# Patient Record
Sex: FEMALE | Race: ASIAN | Employment: FULL TIME | URBAN - METROPOLITAN AREA
[De-identification: names, ages, dates, MRNs, and addresses within clinical notes are randomized per-mention and may not be internally consistent; named-entity substitution may affect disease eponyms.]

---

## 2017-01-17 ENCOUNTER — OFFICE VISIT (OUTPATIENT)
Dept: FAMILY MEDICINE | Facility: CLINIC | Age: 37
End: 2017-01-17
Payer: COMMERCIAL

## 2017-01-17 VITALS
RESPIRATION RATE: 12 BRPM | HEART RATE: 86 BPM | BODY MASS INDEX: 28.14 KG/M2 | SYSTOLIC BLOOD PRESSURE: 140 MMHG | WEIGHT: 164 LBS | TEMPERATURE: 98.2 F | DIASTOLIC BLOOD PRESSURE: 100 MMHG

## 2017-01-17 DIAGNOSIS — J40 BRONCHITIS: Primary | ICD-10-CM

## 2017-01-17 DIAGNOSIS — J45.20 INTERMITTENT ASTHMA, UNCOMPLICATED: ICD-10-CM

## 2017-01-17 PROCEDURE — 99213 OFFICE O/P EST LOW 20 MIN: CPT | Performed by: FAMILY MEDICINE

## 2017-01-17 RX ORDER — AZITHROMYCIN 250 MG/1
TABLET, FILM COATED ORAL
Qty: 6 TABLET | Refills: 0 | Status: SHIPPED | OUTPATIENT
Start: 2017-01-17 | End: 2017-04-25

## 2017-01-17 RX ORDER — ALFUZOSIN HYDROCHLORIDE 10 MG/1
TABLET, EXTENDED RELEASE ORAL
Refills: 11 | COMMUNITY
Start: 2017-01-04 | End: 2017-04-25

## 2017-01-17 NOTE — MR AVS SNAPSHOT
After Visit Summary   1/17/2017    Kelly Wright    MRN: [id number]           Patient Information     Date Of Birth          1980        Visit Information        Provider Department      1/17/2017 3:30 PM [facility]ule[facility] Allred [facility]        Today's Diagnoses     Bronchitis    -  1     Intermittent asthma, uncomplicated           Care Instructions    Take medications as directed.  Treatment  and symptomatic cares discussed   Follow up if problem or concern           Follow-ups after your visit        Who to contact     If you have questions or need follow up information about today's clinic visit or your schedule please contact [facility] [place] directly at [phone].  Normal or non-critical lab and imaging results will be communicated to you by MyChart, letter or phone within 4 business days after the clinic has received the results. If you do not hear from us within 7 days, please contact the clinic through HeyWire Businesshart or phone. If you have a critical or abnormal lab result, we will notify you by phone as soon as possible.  Submit refill requests through Virool or call your pharmacy and they will forward the refill request to us. Please allow 3 business days for your refill to be completed.          Additional Information About Your Visit        MyChart Information     Virool gives you secure access to your electronic health record. If you see a primary care provider, you can also send messages to your care team and make appointments. If you have questions, please call your primary care clinic.  If you do not have a primary care provider, please call [phone] and they will assist you.        Care EveryWhere ID     This is your Care EveryWhere ID. This could be used by other organizations to access your [place] medical records  [id number]        Your Vitals Were     Pulse Temperature Respirations Last Period          86 98.2  F (36.8  C) (Tympanic)  12 01/05/2017         Blood Pressure from Last 3 Encounters:   01/17/17 140/100   02/15/16 126/40   01/22/16 130/80    Weight from Last 3 Encounters:   01/17/17 164 lb (74.39 kg)   02/15/16 160 lb (72.576 kg)   01/22/16 156 lb (70.761 kg)              Today, you had the following     No orders found for display         Today's Medication Changes          These changes are accurate as of: 1/17/17  4:02 PM.  If you have any questions, ask your nurse or doctor.               Start taking these medicines.        Dose/Directions    azithromycin 250 MG tablet   Commonly known as:  ZITHROMAX   Used for:  Bronchitis   Started by:  Zulema Allred MD        Two tablets first day, then one tablet daily for four days.   Quantity:  6 tablet   Refills:  0            Where to get your medicines      These medications were sent to [facility] - [place], [place] - [address] AT [address] [place] [address] & [facility]  [address] [place] [address]    Hours:  24-hours Phone:  [phone]    - azithromycin 250 MG tablet             Primary Care Provider Office Phone # Fax #    Errol Avtar Delcid [phone]       [facility] [address]  [facility] [address]        Thank you!     Thank you for choosing [facility]  for your care. Our goal is always to provide you with excellent care. Hearing back from our patients is one way we can continue to improve our services. Please take a few minutes to complete the written survey that you may receive in the mail after your visit with us. Thank you!             Your Updated Medication List - Protect others around you: Learn how to safely use, store and throw away your medicines at www.disposemymeds.org.          This list is accurate as of: 1/17/17  4:02 PM.  Always use your most recent med list.                   Brand Name Dispense Instructions for use    albuterol 108 (90 BASE) MCG/ACT Inhaler    albuterol    2 Inhaler     Inhale 2 puffs into the lungs every 6 hours as needed for shortness of breath / dyspnea       alfuzosin 10 MG 24 hr tablet    UROXATRAL     TK 1 T PO DAILY       ASTHMA CONTROLLER MEDICATION NOT PRESCRIBED (INTENTIONAL)     0 each    Asthma controller medication not prescribed intentionally due to Other:not needed       azithromycin 250 MG tablet    ZITHROMAX    6 tablet    Two tablets first day, then one tablet daily for four days.       fluticasone 50 MCG/ACT spray    FLONASE    3 Package    Spray 1-2 sprays into both nostrils daily       Multi-vitamin Tabs tablet      Take 1 tablet by mouth daily       PROBIOTIC PO          SULINDAC PO      Take by mouth 2 times daily       SUMAtriptan 100 MG tablet    IMITREX    6 tablet    Take 1 tablet (100 mg) by mouth at onset of headache for migraine May repeat in 2 hours if needed: max 2/day; average number of headaches monthly       VALTREX PO      Take 1,000 mg by mouth       ZYRTEC-D PO      Take by mouth daily

## 2017-01-17 NOTE — PATIENT INSTRUCTIONS
Take medications as directed.  Treatment  and symptomatic cares discussed   Follow up if problem or concern

## 2017-01-17 NOTE — NURSING NOTE
"Chief Complaint   Patient presents with     Cough       Initial /100 mmHg  Pulse 86  Temp(Src) 98.2  F (36.8  C) (Tympanic)  Resp 12  Wt 164 lb (74.39 kg)  LMP 01/05/2017 Estimated body mass index is 28.14 kg/(m^2) as calculated from the following:    Height as of 2/15/16: 5' 4\" (1.626 m).    Weight as of this encounter: 164 lb (74.39 kg).  BP completed using cuff size: regular  "

## 2017-01-17 NOTE — PROGRESS NOTES
SUBJECTIVE:                                                    Kelly Wright is a 36 year old female who presents to clinic today for the following health issues:      Acute Illness   Acute illness concerns: cough  Onset: 2 weeks ago  , started as cold although feeling like sx are worse past few days and more in to chest     Fever: no    Chills/Sweats: no    Headache (location?): YES- slight     Sinus Pressure:no    Conjunctivitis:  no    Ear Pain: no    Rhinorrhea: no    Congestion: YES    Sore Throat: slight irrtation, dry      Cough: YES-productive of yellow thick sputum, productive of green sputum    Wheeze: no occasional tightness so juts used inhaler although did not help much . Has know hx of asthma and  allergies although she thinks her asthma is well controlled and she rarely needs inhaler. Allergies are usually worse in fall but has other indoor  allergies toto and Flonase helps she uses it all year     Decreased Appetite: no    Nausea: no    Vomiting: no    Diarrhea:  no    Dysuria/Freq.: no    Fatigue/Achiness: no    Sick/Strep Exposure: YES     Therapies Tried and outcome: alkaseltzer cold and cough, dayquil, sudafed , zyrtec, inhaler       Asthma Follow-Up    Was ACT completed today?    Yes    ACT Total Scores 11/19/2015   ACT TOTAL SCORE -   ASTHMA ER VISITS -   ASTHMA HOSPITALIZATIONS -   ACT TOTAL SCORE (Goal Greater than or Equal to 20) 25   In the past 12 months, how many times did you visit the emergency room for your asthma without being admitted to the hospital? 0   In the past 12 months, how many times were you hospitalized overnight because of your asthma? 0         Problem list and histories reviewed & adjusted, as indicated.  Additional history: as documented    Problem list, Medication list, Allergies, and Medical/Social/Surgical histories reviewed in EPIC and updated as appropriate.    ROS:  C: NEGATIVE for fever, chills, change in weight  E: NEGATIVE for vision changes or  irritation  ENT/MOUTH: as per HPI   RESP:as above  CV: NEGATIVE for chest pain, palpitations or peripheral edema  GI: NEGATIVE for nausea, abdominal pain, heartburn, or change in bowel habits  M: NEGATIVE for significant arthralgias or myalgia  P: NEGATIVE for changes in mood or affect    OBJECTIVE:                                                    /100 mmHg  Pulse 86  Temp(Src) 98.2  F (36.8  C) (Tympanic)  Resp 12  Wt 164 lb (74.39 kg)  LMP 01/05/2017  Body mass index is 28.14 kg/(m^2).  GENERAL: healthy, alert and no distress  EYES: Eyes grossly normal to inspection  HENT: ear canals and TM's normal and oral mucous membranes moist, Throat with mild pharyngeal erythema, no sinus  tenderness  NECK: no adenopathy,   RESP: lungs clear to auscultation - no rales, rhonchi or wheezes  CV: regular rate and rhythm, normal S1 S2, no S3 or S4, no murmur, click or rub, no peripheral edema and peripheral pulses strong         ASSESSMENT/PLAN:                                                        (J40) Bronchitis  (primary encounter diagnosis)  Comment:   Plan: azithromycin (ZITHROMAX) 250 MG tablet              URI lingering onto bronchitis. Treat with z pack. Cares and symptomatic treatment discussed follow up if problem     (J45.20) Intermittent asthma, uncomplicated  Comment:   Plan: stable       Patient expressed understanding and agreement with treatment plan. All patient's questions were answered, will let me know if has more later.  Medications: Rx's: Reviewed the potential side effects/complications of medications prescribed.       Zulema Allred MD  [facility]

## 2017-01-18 ASSESSMENT — ASTHMA QUESTIONNAIRES: ACT_TOTALSCORE: 25

## 2017-04-25 ENCOUNTER — OFFICE VISIT (OUTPATIENT)
Dept: FAMILY MEDICINE | Facility: CLINIC | Age: 37
End: 2017-04-25
Payer: COMMERCIAL

## 2017-04-25 VITALS
HEIGHT: 64 IN | HEART RATE: 81 BPM | BODY MASS INDEX: 27.66 KG/M2 | DIASTOLIC BLOOD PRESSURE: 92 MMHG | OXYGEN SATURATION: 97 % | TEMPERATURE: 98 F | WEIGHT: 162 LBS | SYSTOLIC BLOOD PRESSURE: 133 MMHG

## 2017-04-25 DIAGNOSIS — R35.0 URINARY FREQUENCY: Primary | ICD-10-CM

## 2017-04-25 LAB
ALBUMIN UR-MCNC: NEGATIVE MG/DL
APPEARANCE UR: CLEAR
BACTERIA #/AREA URNS HPF: ABNORMAL /HPF
BILIRUB UR QL STRIP: NEGATIVE
COLOR UR AUTO: YELLOW
GLUCOSE UR STRIP-MCNC: NEGATIVE MG/DL
HGB UR QL STRIP: ABNORMAL
KETONES UR STRIP-MCNC: NEGATIVE MG/DL
LEUKOCYTE ESTERASE UR QL STRIP: ABNORMAL
NITRATE UR QL: NEGATIVE
NON-SQ EPI CELLS #/AREA URNS LPF: ABNORMAL /LPF
PH UR STRIP: 7 PH (ref 5–7)
RBC #/AREA URNS AUTO: ABNORMAL /HPF (ref 0–2)
SP GR UR STRIP: 1.01 (ref 1–1.03)
URN SPEC COLLECT METH UR: ABNORMAL
UROBILINOGEN UR STRIP-ACNC: 0.2 EU/DL (ref 0.2–1)
WBC #/AREA URNS AUTO: ABNORMAL /HPF (ref 0–2)

## 2017-04-25 PROCEDURE — 87086 URINE CULTURE/COLONY COUNT: CPT | Performed by: NURSE PRACTITIONER

## 2017-04-25 PROCEDURE — 99213 OFFICE O/P EST LOW 20 MIN: CPT | Performed by: NURSE PRACTITIONER

## 2017-04-25 PROCEDURE — 81001 URINALYSIS AUTO W/SCOPE: CPT | Performed by: NURSE PRACTITIONER

## 2017-04-25 RX ORDER — ALFUZOSIN HYDROCHLORIDE 10 MG/1
10 TABLET, EXTENDED RELEASE ORAL DAILY
COMMUNITY
Start: 2017-04-25 | End: 2018-06-28

## 2017-04-25 NOTE — NURSING NOTE
"Chief Complaint   Patient presents with     Urinary Problem       Initial BP (!) 133/92 (BP Location: Left arm, Patient Position: Chair, Cuff Size: Adult Regular)  Pulse 81  Temp 98  F (36.7  C) (Oral)  Ht 5' 4\" (1.626 m)  Wt 162 lb (73.5 kg)  LMP 04/01/2017  SpO2 97%  Breastfeeding? No  BMI 27.81 kg/m2 Estimated body mass index is 27.81 kg/(m^2) as calculated from the following:    Height as of this encounter: 5' 4\" (1.626 m).    Weight as of this encounter: 162 lb (73.5 kg).  Medication Reconciliation: complete  "

## 2017-04-25 NOTE — PROGRESS NOTES
HPI      SUBJECTIVE:                                                    Kelly Wright is a 36 year old female who presents to clinic today for the following health issues:      Has urethral polyps that flare up and mimic UTIs  Symptoms today are for a few days, and slightly longer than her normal flare.   Just is feeling uncomfortable-pelvic   Slight urinary frequency  No dysuria   All of her UTIs have been neg UAs and positive cultures    Is doing IUI next month        Past Medical History:   Diagnosis Date     Abdominal pain 9/12    elev lft's, then ruq us fatty liver, endoscopic us done 10/12 and dilated ducts.     Arthritis      Cervical dysplasia      Condyloma acuminatum      Elevated liver enzymes 9/12    felt due to fatty liver     Fatty liver 9/12     Fibroids      Gastro-oesophageal reflux disease      H/O cold sores      HPV in female      Intermittent asthma     mostly with allergies     Joint pain 2011    Dr. Dickson     Migraine      Migraines 2007     Pancreatic disease      Pelvic pain in female      PONV (postoperative nausea and vomiting)      Seasonal allergies      Sphincter of Oddi dysfunction 2014    Dr. Ras Juárez, had ercp, eus, panc stents placed     Spondyloarthropathy (H)      Urinary frequency      Past Surgical History:   Procedure Laterality Date     ENDOSCOPIC RETROGRADE CHOLANGIOPANCREATOGRAM  6/10/2014    Procedure: ENDOSCOPIC RETROGRADE CHOLANGIOPANCREATOGRAM;  Surgeon: Ras Juárez MD;  Location: [facility] OR     ENDOSCOPIC RETROGRADE CHOLANGIOPANCREATOGRAPHY  11/12     ESOPHAGOSCOPY, GASTROSCOPY, DUODENOSCOPY (EGD), COMBINED  7/8/2014    Procedure: COMBINED ESOPHAGOSCOPY, GASTROSCOPY, DUODENOSCOPY (EGD), REMOVE FOREIGN BODY;  Surgeon: Gabbie Menjivar MD;  Location: [facility]     GYN SURGERY      LEEP      [facility] ENDOSCOPY W EUS N/A 5/20/2014    Procedure: COMBINED ENDOSCOPIC ULTRASOUND, ESOPHAGOSCOPY, GASTROSCOPY, DUODENOSCOPY (EGD);  Surgeon: Gabbie Menjivar MD;   "Location: [facility] GI     LAPAROSCOPIC CHOLECYSTECTOMY  11/12     LAPAROSCOPIC MYOMECTOMY UTERUS N/A 10/30/2015    Procedure: LAPAROSCOPIC MYOMECTOMY UTERUS;  Surgeon: Mayda Dior MD;  Location:  OR     Social History   Substance Use Topics     Smoking status: Never Smoker     Smokeless tobacco: Never Used     Alcohol use 1.8 oz/week     3 Standard drinks or equivalent per week      Comment: 3-5 a week     Current Outpatient Prescriptions   Medication Sig Dispense Refill     alfuzosin (UROXATRAL) 10 MG 24 hr tablet Take 1 tablet (10 mg) by mouth daily       albuterol (ALBUTEROL) 108 (90 BASE) MCG/ACT inhaler Inhale 2 puffs into the lungs every 6 hours as needed for shortness of breath / dyspnea 2 Inhaler 2     multivitamin, therapeutic with minerals (MULTI-VITAMIN) TABS Take 1 tablet by mouth daily       Probiotic Product (PROBIOTIC PO)        ValACYclovir HCl (VALTREX PO) Take 1,000 mg by mouth       SUMAtriptan (IMITREX) 100 MG tablet Take 1 tablet (100 mg) by mouth at onset of headache for migraine May repeat in 2 hours if needed: max 2/day; average number of headaches monthly 6 tablet 1     fluticasone (FLONASE) 50 MCG/ACT nasal spray Spray 1-2 sprays into both nostrils daily 3 Package 3     Cetirizine-Pseudoephedrine (ZYRTEC-D PO) Take by mouth daily        SULINDAC PO Take by mouth 2 times daily        ASTHMA CONTROLLER MEDICATION NOT PRESCRIBED, INTENTIONAL, Asthma controller medication not prescribed intentionally due to Other:not needed 0 each 0     Allergies   Allergen Reactions     Amoxicillin Rash     Animal Dander      Diclofenac Nausea and Vomiting     Keflex [Cephalexin Monohydrate] Rash     Seasonal Allergies        Reviewed PMH, med list and allergies.      ROS  Detailed as above       BP (!) 133/92 (BP Location: Left arm, Patient Position: Chair, Cuff Size: Adult Regular)  Pulse 81  Temp 98  F (36.7  C) (Oral)  Ht 5' 4\" (1.626 m)  Wt 162 lb (73.5 kg)  LMP 04/01/2017  SpO2 97%  Breastfeeding? " No  BMI 27.81 kg/m2      Physical Exam   Constitutional: She is well-developed, well-nourished, and in no distress.   Pulmonary/Chest: Effort normal.   Neurological: She is alert.   Psychiatric: Mood and affect normal.   Vitals reviewed.      Assessment and Plan:       ICD-10-CM    1. Urinary frequency R35.0 UA reflex to Microscopic and Culture     alfuzosin (UROXATRAL) 10 MG 24 hr tablet     Urine Microscopic     Urine Culture Aerobic Bacterial       Neg UA  Ordered culture based on her history   If symptoms persist, she should see her urologist       KAREN Conklin, CNP  [facility]

## 2017-04-26 LAB
BACTERIA SPEC CULT: NORMAL
MICRO REPORT STATUS: NORMAL
SPECIMEN SOURCE: NORMAL

## 2017-07-21 DIAGNOSIS — J45.20 MILD INTERMITTENT ASTHMA WITHOUT COMPLICATION: Primary | ICD-10-CM

## 2017-07-21 DIAGNOSIS — J40 BRONCHITIS: ICD-10-CM

## 2017-07-21 NOTE — TELEPHONE ENCOUNTER
Reason for Call:  Medication or medication refill:    Do you use a [facility]?  Name of the pharmacy and phone number for the current request:    [facility] [place], [place] - [address]      Name of the medication requested: albuterol (ALBUTEROL) 108 (90 BASE) MCG/ACT inhaler    Other request: pt received it for a [address].    Can we leave a detailed message on this number? YES    Phone number patient can be reached at: Home number on file [phone] (home)    Best Time: any    Call taken on 7/21/2017 at 10:34 AM by Diamond Hodge

## 2017-07-21 NOTE — TELEPHONE ENCOUNTER
albuterol (ALBUTEROL) 108 (90 BASE) MCG/ACT inhaler 2 Inhaler 2 1/22/2016  No   Sig: Inhale 2 puffs into the lungs every 6 hours as needed for shortness of breath / dyspnea     January 2016 Associated Diagnoses   (? CHANGE?  --asthma is on on problem list)   Bronchitis [J40]  - Primary                Last Written Prescription Date: 01/22/2016  Last Fill Quantity: 2, # refills: 2    Last Office Visit with G, [facility] or [facility] prescribing provider:  Dr. Delcid 5/2015  Others since, Irish 4/2017    Future Office Visit:   no    Date of Last Asthma Action Plan Letter:   Asthma Action Plan Q1 Year    Topic Date Due     Asthma Action Plan - yearly  01/05/2017      Asthma Control Test:   ACT Total Scores 1/17/2017   ACT TOTAL SCORE -   ASTHMA ER VISITS -   ASTHMA HOSPITALIZATIONS -   ACT TOTAL SCORE (Goal Greater than or Equal to 20) 25   In the past 12 months, how many times did you visit the emergency room for your asthma without being admitted to the hospital? 0   In the past 12 months, how many times were you hospitalized overnight because of your asthma? 0       Date of Last Spirometry Test:   No results found for this or any previous visit.

## 2017-07-24 DIAGNOSIS — J45.20 MILD INTERMITTENT ASTHMA WITHOUT COMPLICATION: ICD-10-CM

## 2017-07-24 RX ORDER — ALBUTEROL SULFATE 90 UG/1
2 AEROSOL, METERED RESPIRATORY (INHALATION) EVERY 6 HOURS PRN
Qty: 1 INHALER | Refills: 0 | Status: SHIPPED | OUTPATIENT
Start: 2017-07-24 | End: 2017-12-21

## 2017-07-24 RX ORDER — ALBUTEROL SULFATE 90 UG/1
2 AEROSOL, METERED RESPIRATORY (INHALATION) EVERY 6 HOURS PRN
Qty: 1 INHALER | Refills: 0 | Status: SHIPPED | OUTPATIENT
Start: 2017-07-24 | End: 2017-07-24

## 2017-07-24 NOTE — TELEPHONE ENCOUNTER
Medication is being filled for 1 time refill only due to:  Patient needs to be seen because it has been more than one year since last visit.     Monique Rios RN, BSN

## 2017-10-26 ENCOUNTER — TELEPHONE (OUTPATIENT)
Dept: FAMILY MEDICINE | Facility: CLINIC | Age: 37
End: 2017-10-26

## 2017-12-05 ENCOUNTER — OFFICE VISIT (OUTPATIENT)
Dept: FAMILY MEDICINE | Facility: CLINIC | Age: 37
End: 2017-12-05
Payer: COMMERCIAL

## 2017-12-05 VITALS
HEIGHT: 64 IN | SYSTOLIC BLOOD PRESSURE: 124 MMHG | DIASTOLIC BLOOD PRESSURE: 86 MMHG | WEIGHT: 162 LBS | OXYGEN SATURATION: 98 % | BODY MASS INDEX: 27.66 KG/M2 | HEART RATE: 82 BPM | TEMPERATURE: 98.3 F

## 2017-12-05 DIAGNOSIS — J06.9 VIRAL UPPER RESPIRATORY TRACT INFECTION: ICD-10-CM

## 2017-12-05 DIAGNOSIS — J30.1 SEASONAL ALLERGIC RHINITIS DUE TO POLLEN, UNSPECIFIED CHRONICITY: ICD-10-CM

## 2017-12-05 DIAGNOSIS — J45.20 MILD INTERMITTENT ASTHMA WITHOUT COMPLICATION: ICD-10-CM

## 2017-12-05 DIAGNOSIS — R05.9 COUGH: Primary | ICD-10-CM

## 2017-12-05 PROCEDURE — 99213 OFFICE O/P EST LOW 20 MIN: CPT | Performed by: FAMILY MEDICINE

## 2017-12-05 RX ORDER — BENZONATATE 100 MG/1
100 CAPSULE ORAL 3 TIMES DAILY PRN
Qty: 42 CAPSULE | Refills: 0 | Status: SHIPPED | OUTPATIENT
Start: 2017-12-05 | End: 2018-06-28

## 2017-12-05 NOTE — NURSING NOTE
"Chief Complaint   Patient presents with     URI       Initial BP (!) 133/94  Pulse 82  Temp 98.3  F (36.8  C) (Tympanic)  Ht 5' 4\" (1.626 m)  Wt 162 lb (73.5 kg)  LMP 11/19/2017  SpO2 98%  BMI 27.81 kg/m2 Estimated body mass index is 27.81 kg/(m^2) as calculated from the following:    Height as of this encounter: 5' 4\" (1.626 m).    Weight as of this encounter: 162 lb (73.5 kg).  Medication Reconciliation: complete  "

## 2017-12-05 NOTE — MR AVS SNAPSHOT
After Visit Summary   12/5/2017    Kelly Wright    MRN: [id number]           Patient Information     Date Of Birth          1980        Visit Information        Provider Department      12/5/2017 3:40 PM Zulema Allred MD [facility]        Today's Diagnoses     Cough    -  1    Seasonal allergic rhinitis due to pollen, unspecified chronicity        Mild intermittent asthma without complication        Viral upper respiratory tract infection          Care Instructions    Take medications as directed.  Cares and symptomatic cares discussed   Follow up if problem or concern             Follow-ups after your visit        Who to contact     If you have questions or need follow up information about today's clinic visit or your schedule please contact [facility] directly at [phone].  Normal or non-critical lab and imaging results will be communicated to you by Poppinhart, letter or phone within 4 business days after the clinic has received the results. If you do not hear from us within 7 days, please contact the clinic through Poppinhart or phone. If you have a critical or abnormal lab result, we will notify you by phone as soon as possible.  Submit refill requests through Multic[employer] Media or call your pharmacy and they will forward the refill request to us. Please allow 3 business days for your refill to be completed.          Additional Information About Your Visit        MyChart Information     Mult[employer] Media gives you secure access to your electronic health record. If you see a primary care provider, you can also send messages to your care team and make appointments. If you have questions, please call your primary care clinic.  If you do not have a primary care provider, please call [phone] and they will assist you.        Care EveryWhere ID     This is your Care EveryWhere ID. This could be used by other organizations to access your [facility]  "records  [id number]        Your Vitals Were     Pulse Temperature Height Last Period Pulse Oximetry BMI (Body Mass Index)    82 98.3  F (36.8  C) (Tympanic) 5' 4\" (1.626 m) 11/19/2017 98% 27.81 kg/m2       Blood Pressure from Last 3 Encounters:   12/05/17 124/86   04/25/17 (!) 133/92   01/17/17 (!) 140/100    Weight from Last 3 Encounters:   12/05/17 162 lb (73.5 kg)   04/25/17 162 lb (73.5 kg)   01/17/17 164 lb (74.4 kg)              Today, you had the following     No orders found for display         Today's Medication Changes          These changes are accurate as of: 12/5/17  4:10 PM.  If you have any questions, ask your nurse or doctor.               Start taking these medicines.        Dose/Directions    benzonatate 100 MG capsule   Commonly known as:  TESSALON   Used for:  Cough   Started by:  Zulema Allred MD        Dose:  100 mg   Take 1 capsule (100 mg) by mouth 3 times daily as needed for cough   Quantity:  42 capsule   Refills:  0            Where to get your medicines      These medications were sent to [facility] - [place], [place] - [address] AT [address]  [address] [place] [address] [address]     Phone:  [phone]     benzonatate 100 MG capsule                Primary Care Provider Office Phone # Fax #    Errol Delcid [phone] [address] [address]        Equal Access to Services     [facility] AH: Hadii aad ku hadashedis Sotete, waaxda luqadaha, qaybta kaalmada hallie, rex keller. So [facility] [phone].    ATENCIÓN: Si habla español, tiene a orozco disposición servicios gratuitos de asistencia lingüística. Mandeep young [phone].    We comply with applicable federal civil rights laws and [place] laws. We do not discriminate on the basis of race, color, national origin, age, disability, sex, sexual orientation, or gender identity.            Thank " you!     Thank you for choosing [facility] [place]  for your care. Our goal is always to provide you with excellent care. Hearing back from our patients is one way we can continue to improve our services. Please take a few minutes to complete the written survey that you may receive in the mail after your visit with us. Thank you!             Your Updated Medication List - Protect others around you: Learn how to safely use, store and throw away your medicines at www.disposemymeds.org.          This list is accurate as of: 12/5/17  4:10 PM.  Always use your most recent med list.                   Brand Name Dispense Instructions for use Diagnosis    albuterol 108 (90 BASE) MCG/ACT Inhaler    PROAIR HFA    1 Inhaler    Inhale 2 puffs into the lungs every 6 hours as needed for shortness of breath / dyspnea Due for office visit: [phone]    Mild intermittent asthma without complication       alfuzosin 10 MG 24 hr tablet    UROXATRAL     Take 1 tablet (10 mg) by mouth daily    Urinary frequency       ASTHMA CONTROLLER MEDICATION NOT PRESCRIBED (INTENTIONAL)     0 each    Asthma controller medication not prescribed intentionally due to Other:not needed    Intermittent asthma       benzonatate 100 MG capsule    TESSALON    42 capsule    Take 1 capsule (100 mg) by mouth 3 times daily as needed for cough    Cough       fluticasone 50 MCG/ACT spray    FLONASE    3 Package    Spray 1-2 sprays into both nostrils daily    Seasonal allergies       Multi-vitamin Tabs tablet      Take 1 tablet by mouth daily        PROBIOTIC PO           VALTREX PO      Take 1,000 mg by mouth        ZYRTEC-D PO      Take by mouth daily

## 2017-12-05 NOTE — PROGRESS NOTES
SUBJECTIVE:   Kelly Wright is a 37 year old female who presents to clinic today for the following health issues:      RESPIRATORY SYMPTOMS      Duration: x 2 weeks     Description  Was feeling quiet bad earlier, although she was on z-pack for something else so she thinsk it may have helped. Finished last dose yesterday  So feels slight better but has ongoing cough nasal congestion, rhinorrhea mostly , cough dry  and sometimes chest tightness  lately has tried her inhaler not helping. much . She just wish to try cough drops taht has worked well for her in the past     Severity: moderate    Accompanying signs and symptoms: shortness of breath sometimes and has hx of asthma although usually it is well controled and she rarely needs inhaler     History (predisposing factors):  asthma    Precipitating or alleviating factors: None    Therapies tried and outcome:  OTC cough syrup and inhalers             Problem list and histories reviewed & adjusted, as indicated.  Additional history: as documented    Patient Active Problem List   Diagnosis     Seasonal allergies     Headache     Joint pain     Intermittent asthma     Fatty liver     Elevated liver enzymes     Sphincter of Oddi dysfunction     Hypercholesterolemia     Mild intermittent asthma without complication     Overweight (BMI 25.0-29.9)     Migraine without aura and without status migrainosus, not intractable     Intermittent asthma, uncomplicated     Past Surgical History:   Procedure Laterality Date     ENDOSCOPIC RETROGRADE CHOLANGIOPANCREATOGRAM  6/10/2014    Procedure: ENDOSCOPIC RETROGRADE CHOLANGIOPANCREATOGRAM;  Surgeon: Ras Juárez MD;  Location:  OR     ENDOSCOPIC RETROGRADE CHOLANGIOPANCREATOGRAPHY  11/12     ESOPHAGOSCOPY, GASTROSCOPY, DUODENOSCOPY (EGD), COMBINED  7/8/2014    Procedure: COMBINED ESOPHAGOSCOPY, GASTROSCOPY, DUODENOSCOPY (EGD), REMOVE FOREIGN BODY;  Surgeon: Gabbie Menjivar MD;  Location:  GI     [facility] SURG[facility]    [facility]    Procedure: COMBINED ENDOSCOPIC ULTRASOUND, ESOPHAGOSCOPY, GASTROSCOPY, DUODENOSCOPY (EGD);  Surgeon: Gabbie Menjivar MD;  Location: [facility]     LAPAROSCOPIC CHOLECYSTECTOMY  11/12     LAPAROSCOPIC MYOMECTOMY UTERUS N/A 10/30/2015    Procedure: LAPAROSCOPIC MYOMECTOMY UTERUS;  Surgeon: Mayda Dior MD;  Location:  OR       Social History   Substance Use Topics     Smoking status: Never Smoker     Smokeless tobacco: Never Used     Alcohol use 1.8 oz/week     3 Standard drinks or equivalent per week      Comment: 3-5 a week     Family History   Problem Relation Age of Onset     Unknown/Adopted Father              Reviewed and updated as needed this visit by clinical staff     Reviewed and updated as needed this visit by Provider         ROS:  Constitutional, HEENT, cardiovascular, pulmonary, GI, , musculoskeletal, neuro, skin, endocrine and psych systems are negative, except as otherwise noted.      OBJECTIVE:   BP (!) 133/94  Pulse 82  Temp 98.3  F (36.8  C) (Tympanic)  Ht 5' 4\" (1.626 m)  Wt 162 lb (73.5 kg)  LMP 11/19/2017  SpO2 98%  BMI 27.81 kg/m2  Body mass index is 27.81 kg/(m^2).  GENERAL: healthy, alert and no distress  EYES: Eyes grossly normal to inspection  HENT: ear canals and TM's normal, nasal mucosa edematous , oropharynx clear and oral mucous membranes moist  NECK: no adenopathy  RESP: lungs clear to auscultation - no rales, rhonchi or wheezes  CV: regular rate and rhythm, normal S1 S2, no S3 or S4, no murmur,  ABDOMEN: soft, nontender, no hepatosplenomegaly, no masses and bowel sounds normal          ASSESSMENT/PLAN:     (J06.9,  B97.89) Viral upper respiratory tract infection  Comment:   Plan:       (R05) Cough  (primary encounter diagnosis)  Comment:  Plan: benzonatate (TESSALON) 100 MG capsule            (J30.1) Seasonal allergic rhinitis due to pollen, unspecified chronicity  Comment:   Plan: per pt she is doing ok     (J45.20) " Mild intermittent asthma without complication  Comment:   Plan: stable on prn inhaler         URI lingering onto possible bronchitis. Treated with z pack. She is better but still has some possible post bronchitis cough,    also allergy could contribute to her sx. Script given for cough drops for now        Cares and symptomatic treatment discussed follow up if problem       Patient expressed understanding and agreement with treatment plan. All patient's questions were answered, will let me know if has more later.  Medications: Rx's: Reviewed the potential side effects/complications of medications prescribed.       Zulema Allred MD  [facility] [place]

## 2017-12-21 ENCOUNTER — OFFICE VISIT (OUTPATIENT)
Dept: FAMILY MEDICINE | Facility: CLINIC | Age: 37
End: 2017-12-21
Payer: COMMERCIAL

## 2017-12-21 VITALS
DIASTOLIC BLOOD PRESSURE: 70 MMHG | HEART RATE: 100 BPM | HEIGHT: 64 IN | WEIGHT: 159 LBS | SYSTOLIC BLOOD PRESSURE: 128 MMHG | BODY MASS INDEX: 27.14 KG/M2 | OXYGEN SATURATION: 99 % | TEMPERATURE: 97.4 F

## 2017-12-21 DIAGNOSIS — J40 BRONCHITIS: ICD-10-CM

## 2017-12-21 DIAGNOSIS — R05.9 COUGH: Primary | ICD-10-CM

## 2017-12-21 DIAGNOSIS — R07.81 RIB PAIN ON RIGHT SIDE: ICD-10-CM

## 2017-12-21 DIAGNOSIS — J45.20 MILD INTERMITTENT ASTHMA WITHOUT COMPLICATION: ICD-10-CM

## 2017-12-21 PROCEDURE — 99214 OFFICE O/P EST MOD 30 MIN: CPT | Performed by: FAMILY MEDICINE

## 2017-12-21 RX ORDER — ALBUTEROL SULFATE 90 UG/1
2 AEROSOL, METERED RESPIRATORY (INHALATION) EVERY 6 HOURS PRN
Qty: 1 INHALER | Refills: 1 | Status: SHIPPED | OUTPATIENT
Start: 2017-12-21 | End: 2022-11-14

## 2017-12-21 RX ORDER — MOXIFLOXACIN HYDROCHLORIDE 400 MG/1
400 TABLET ORAL DAILY
Qty: 5 TABLET | Refills: 0 | Status: SHIPPED | OUTPATIENT
Start: 2017-12-21 | End: 2018-06-28

## 2017-12-21 NOTE — NURSING NOTE
"Chief Complaint   Patient presents with     Cough       Initial /70  Pulse 100  Temp 97.4  F (36.3  C) (Tympanic)  Ht 5' 4\" (1.626 m)  Wt 159 lb (72.1 kg)  LMP 11/19/2017  SpO2 99%  BMI 27.29 kg/m2 Estimated body mass index is 27.29 kg/(m^2) as calculated from the following:    Height as of this encounter: 5' 4\" (1.626 m).    Weight as of this encounter: 159 lb (72.1 kg).  Medication Reconciliation: complete   Mirna Narvaez CMA      "

## 2017-12-21 NOTE — PATIENT INSTRUCTIONS
"  Asthma (Adult)  Asthma is a disease where the medium and  small air passages within the lung go into spasm and restrict the flow of air. Inflammation and swelling of the airways cause further blockage. During an acute asthma attack, these factors cause trouble breathing, wheezing, cough and chest tightness.    An asthma attack can be triggered by many things. Common triggers include infections such as the common cold, bronchitis, and pneumonia. Irritants such as smoke or pollutants in the air, very cold air, emotional upset, and exercise can also trigger an attack. In many adults with asthma, allergies to dust, mold, pollen and animal dander can cause an asthma attack. Skipping doses of daily asthma medicine can also bring on an asthma attack.  Asthma can be controlled using the proper medicines prescribed by your healthcare provider and avoiding exposure to known triggers including allergens and irritants.  Home care    Take prescribed medicine exactly at the times advised. If you need medicine such as from a hand held inhaler or aerosol breathing machine more than every 4 hours, contact your healthcare provider or seek immediate medical attention. If prescribed an antibiotic or prednisone, take all of the medicine as prescribed, even if you are feeling better after a few days.    Do not smoke. Avoid being exposed to the smoke of others.    Some people with asthma have worsening of their symptoms when they take aspirin and non-steroidal or fever-reducing medicines like ibuprofen and naproxen. Talk to your healthcare provider if you think this may apply to you.  Follow-up care  Follow up with your healthcare provider, or as advised. Always bring all of your current medicines to any appointments with your healthcare provider. Also bring a complete list of medicines even those not taken for asthma. If you do not already have one, talk to your healthcare provider about developing a personalized \"Asthma Action " "Plan.\"  A pneumococcal (pneumonia) vaccine and yearly flu shot (every fall) are recommended. Ask your doctor about this.  When to seek medical advice  Call your healthcare provider right away if any of these occur:     Increased wheezing or shortness of breath    Need to use your inhalers more often than usual without relief    Fever of 100.4 F (38 C) or higher, or as directed by your healthcare provider    Coughing up lots of dark-colored or bloody sputum (mucus)    Chest pain with each breath    If you use a peak flow meter as part of an Asthma Action Plan, and you are still in the yellow zone (50% to 80%) 15 minutes after using inhaler medicine.  Call 911  Call 911 if any of the following occur    Trouble walking or talking because of shortness of breath    If you use a peak flow meter as part of an Asthma Action Plan and you are still in the red zone (less than 50%) 15 minutes after using inhaler medicine    Lips or fingernails turning gray or blue  Date Last Reviewed: 5/1/2017 2000-2017 [employer]. [address] [place] [address]. All rights reserved. This information is not intended as a substitute for professional medical care. Always follow your healthcare professional's instructions.        Controlling Asthma Triggers: Allergens  For many people with lung problems such as asthma or COPD, inhaling allergens leads to inflamed airways. Allergens also cause other types of reactions in some people. For example, a runny nose, itchy, watery eyes, or a skin rash. Do your best to avoid allergens that trigger symptoms. The tips below can help to lessen any reaction you may have to certain allergens.     Wash bedding in hot water (130 F/54.4 C) each week.    Dust mites  Dust mites are tiny bugs too small to see or feel. But they can be a major trigger for allergy and asthma symptoms. Dust mites live in mattresses, bedding, carpets, and upholstered furniture. They can be carried on indoor " dust. They thrive in warm, moist environments.  ? Wash bedding in hot water (130 F/54.4 C) each week. This kills the dust mites.  ? Cover mattress and pillows with special dust-mite-proof (hypoallergenic) cases.  ? Don t use upholstered furniture such as sofas or chairs in the bedroom.  ? Use allergy-proof filters for air conditioners and furnaces. Follow product maker's instructions for maintaining and replacing filters.  ? If you can, replace ibfl-ap-gcji carpets with wood, tile, or linoleum floors. This is especially important in the bedroom.  Animals  Animals with fur or feathers often make allergens. These are shed as tiny particles called dander. Dander can float through the air or stick to carpet, clothing, and furniture.  ? Choose a pet that doesn t have fur or feathers. Examples are fish and reptiles.  ? Keep pets with fur or feathers out of your home. If you can t do this, be sure to keep them out of your bedroom. But keeping a pet out of your bedroom doesn't mean your bedroom is free of pet allergens. If you sit on the couch in the living room and then go into your bedroom, you have brought the pet allergen there.  ? Wash your hands and clothes after handling pets.  Mold  Mold grows in damp places, such as bathrooms, basements, and closets. It can grow anywhere flooding or a fire has caused water damage. Mold can live behind the walls if there has been water damage.  ? Clean damp areas weekly to prevent mold growth. This includes shower stalls and sinks. You may need someone to clean these areas for you. Or, try wearing a mask.  ? Run an exhaust fan while bathing. Or leave a window or door open in the bathroom.  ? Repair water leaks in or around your home.  ? Have someone else cut grass or rake leaves, if possible.  ? Don t use vaporizers, or humidifiers. These put water into the air and encourage mold growth.  Pollen  Pollen from trees, grasses, and weeds is a common allergen. Flower pollens are  generally not a problem.  ? Try to learn what types of pollen affect you most. Pollen levels vary depending on the plant, the season, and the time of day.  ? Use air conditioning instead of opening the windows in your home or car. In the car, choose the setting to recirculate the air, so less pollen gets in.  ? Have someone else do yardwork, if possible.  ? Change clothes in a mudroom when you get home if you are highly allergic to pollens. This will keep most of the pollen from entering the house.  Cockroaches and mice  Cockroaches and mice are common household pests. They also produce allergens.  ? Keep your kitchen clean and dry. A leaky faucet or drain can attract roaches.  ? Remove garbage from your home daily.  ? Store food in tightly sealed containers. Wash dishes promptly as soon as they are used.  ? Use bait stations or traps to control roaches. Avoid using chemical sprays.  Date Last Reviewed: 10/1/2016    7838-8240 [employer]. [address] [place] [address], [place] [address]. All rights reserved. This information is not intended as a substitute for professional medical care. Always follow your healthcare professional's instructions.

## 2017-12-21 NOTE — PROGRESS NOTES
SUBJECTIVE:   Kelly Wright is a 37 year old female who presents to clinic today for the following health issues:      Acute Illness   Acute illness concerns: cough right side pain  Onset: ongoing since Thanksgiving. Was treated for uri although had  lingering sx went to urgent care recently and c xra was negtive     Fever: no    Chills/Sweats: no    Headache (location?): no    Sinus Pressure:no    Conjunctivitis:  no    Ear Pain: no    Rhinorrhea: no    Congestion: no    Sore Throat: no    Hurts to cough and ribs feel achy .      Cough: YES- productive of green sputum    Wheeze: Sometimes just feels  heavy    Decreased Appetite: no    Nausea: no    Vomiting: no    Diarrhea:  no    Dysuria/Freq.: no    Fatigue/Achiness: no    Sick/Strep Exposure: no     Therapies Tried and outcome: Was seen in urgent care on Sunday chest xray done and was normal.  Still having cough and right sided chest pain when coughing  Allergies are ok  Doing nasonex and zytec  D           Problem list and histories reviewed & adjusted, as indicated.  Additional history: as documented    Patient Active Problem List   Diagnosis     Seasonal allergies     Headache     Joint pain     Intermittent asthma     Fatty liver     Elevated liver enzymes     Sphincter of Oddi dysfunction     Hypercholesterolemia     Mild intermittent asthma without complication     Overweight (BMI 25.0-29.9)     Migraine without aura and without status migrainosus, not intractable     Intermittent asthma, uncomplicated     Past Surgical History:   Procedure Laterality Date     ENDOSCOPIC RETROGRADE CHOLANGIOPANCREATOGRAM  6/10/2014    Procedure: ENDOSCOPIC RETROGRADE CHOLANGIOPANCREATOGRAM;  Surgeon: Ras Juáerz MD;  Location: [facility]     ENDOSCOPIC RETROGRADE CHOLANGIOPANCREATOGRAPHY  11/12     ESOPHAGOSCOPY, GASTROSCOPY, DUODENOSCOPY (EGD), COMBINED  7/8/2014    Procedure: COMBINED ESOPHAGOSCOPY, GASTROSCOPY, DUODENOSCOPY (EGD), REMOVE FOREIGN BODY;   "Surgeon: Gabbie Menjivar MD;  Location:  GI     GYN SURGERY      LEEP     [facility] UGI ENDOSCOPY W EUS N/A 5/20/2014    Procedure: COMBINED ENDOSCOPIC ULTRASOUND, ESOPHAGOSCOPY, GASTROSCOPY, DUODENOSCOPY (EGD);  Surgeon: Gabbie Menjivar MD;  Location:  GI     LAPAROSCOPIC CHOLECYSTECTOMY  11/12     LAPAROSCOPIC MYOMECTOMY UTERUS N/A 10/30/2015    Procedure: LAPAROSCOPIC MYOMECTOMY UTERUS;  Surgeon: Mayda Dior MD;  Location:  OR       Social History   Substance Use Topics     Smoking status: Never Smoker     Smokeless tobacco: Never Used     Alcohol use 1.8 oz/week     3 Standard drinks or equivalent per week      Comment: 3-5 a week     Family History   Problem Relation Age of Onset     Unknown/Adopted Father              Reviewed and updated as needed this visit by clinical staff     Reviewed and updated as needed this visit by Provider         ROS:  Constitutional, HEENT, cardiovascular, pulmonary, GI, , musculoskeletal, neuro, skin, endocrine and psych systems are negative, except as otherwise noted.      OBJECTIVE:   /70  Pulse 100  Temp 97.4  F (36.3  C) (Tympanic)  Ht 5' 4\" (1.626 m)  Wt 159 lb (72.1 kg)  LMP 11/19/2017  SpO2 99%  BMI 27.29 kg/m2  Body mass index is 27.29 kg/(m^2).  GENERAL: healthy, alert and no distress  EYES: Eyes grossly normal to inspection, PERRL and conjunctivae and sclerae normal  HENT: ear canals and TM's normal and oral mucous membranes moist  NECK: no adenopathy, no asymmetry,  RESP: lungs clear to auscultation - no rales, rhonchi.  Has occasional wheezes  CV: regular rate and rhythm, normal S1 S2, no S3 or S4, no murmur, click or rub, no peripheral edema and peripheral pulses strong  ABDOMEN: soft, nontender, no hepatosplenomegaly, no masses and bowel sounds normal  SKIN: no suspicious lesions or rashes  NEURO: Normal strength and tone, mentation intact and speech normal  PSYCH: mentation appears normal, affect normal/bright        ASSESSMENT/PLAN: "     (R05) Cough  (primary encounter diagnosis)  Comment:  ongoing cough for several weeks  Plan:     (J45.20) Mild intermittent asthma without complication  Comment:   Plan: albuterol (PROAIR HFA) 108 (90 BASE) MCG/ACT         Inhaler            (R07.81) Rib pain on right side  Comment: Likely related to excessive  Plan:     (J40) Bronchitis  Comment:   Plan: moxifloxacin (AVELOX) 400 MG tablet    Gave her refill on albuterol inhaler to use as needed.  Also reminded patient that if she has continuing or ongoing need for inhaler , she should do a follow-up check to evaluate her cough and concerns with asthma.      Patient expressed understanding and agreement with treatment plan. All patient's questions were answered, will let me know if has more later.  Medications: Rx's: Reviewed the potential side effects/complications of medications prescribed.       Zulema Allred MD  [facility] [place]

## 2017-12-21 NOTE — MR AVS SNAPSHOT
After Visit Summary   12/21/2017    Kelly Wright    MRN: [id number]           Patient Information     Date Of Birth          1980        Visit Information        Provider Department      12/21/2017 9:20 AM Zulema Allred MD [facility]        Today's Diagnoses     Cough    -  1    Mild intermittent asthma without complication        Rib pain on right side        Bronchitis          Care Instructions      Asthma (Adult)  Asthma is a disease where the medium and  small air passages within the lung go into spasm and restrict the flow of air. Inflammation and swelling of the airways cause further blockage. During an acute asthma attack, these factors cause trouble breathing, wheezing, cough and chest tightness.    An asthma attack can be triggered by many things. Common triggers include infections such as the common cold, bronchitis, and pneumonia. Irritants such as smoke or pollutants in the air, very cold air, emotional upset, and exercise can also trigger an attack. In many adults with asthma, allergies to dust, mold, pollen and animal dander can cause an asthma attack. Skipping doses of daily asthma medicine can also bring on an asthma attack.  Asthma can be controlled using the proper medicines prescribed by your healthcare provider and avoiding exposure to known triggers including allergens and irritants.  Home care    Take prescribed medicine exactly at the times advised. If you need medicine such as from a hand held inhaler or aerosol breathing machine more than every 4 hours, contact your healthcare provider or seek immediate medical attention. If prescribed an antibiotic or prednisone, take all of the medicine as prescribed, even if you are feeling better after a few days.    Do not smoke. Avoid being exposed to the smoke of others.    Some people with asthma have worsening of their symptoms when they take aspirin and non-steroidal or fever-reducing medicines  "like ibuprofen and naproxen. Talk to your healthcare provider if you think this may apply to you.  Follow-up care  Follow up with your healthcare provider, or as advised. Always bring all of your current medicines to any appointments with your healthcare provider. Also bring a complete list of medicines even those not taken for asthma. If you do not already have one, talk to your healthcare provider about developing a personalized \"Asthma Action Plan.\"  A pneumococcal (pneumonia) vaccine and yearly flu shot (every fall) are recommended. Ask your doctor about this.  When to seek medical advice  Call your healthcare provider right away if any of these occur:     Increased wheezing or shortness of breath    Need to use your inhalers more often than usual without relief    Fever of 100.4 F (38 C) or higher, or as directed by your healthcare provider    Coughing up lots of dark-colored or bloody sputum (mucus)    Chest pain with each breath    If you use a peak flow meter as part of an Asthma Action Plan, and you are still in the yellow zone (50% to 80%) 15 minutes after using inhaler medicine.  Call 911  Call 911 if any of the following occur    Trouble walking or talking because of shortness of breath    If you use a peak flow meter as part of an Asthma Action Plan and you are still in the red zone (less than 50%) 15 minutes after using inhaler medicine    Lips or fingernails turning gray or blue  Date Last Reviewed: 5/1/2017 2000-2017 [employer]. [address] [place] [address], [place] [address]. All rights reserved. This information is not intended as a substitute for professional medical care. Always follow your healthcare professional's instructions.        Controlling Asthma Triggers: Allergens  For many people with lung problems such as asthma or COPD, inhaling allergens leads to inflamed airways. Allergens also cause other types of reactions in some people. For example, a runny nose, itchy, watery " eyes, or a skin rash. Do your best to avoid allergens that trigger symptoms. The tips below can help to lessen any reaction you may have to certain allergens.     Wash bedding in hot water (130 F/54.4 C) each week.    Dust mites  Dust mites are tiny bugs too small to see or feel. But they can be a major trigger for allergy and asthma symptoms. Dust mites live in mattresses, bedding, carpets, and upholstered furniture. They can be carried on indoor dust. They thrive in warm, moist environments.  ? Wash bedding in hot water (130 F/54.4 C) each week. This kills the dust mites.  ? Cover mattress and pillows with special dust-mite-proof (hypoallergenic) cases.  ? Don t use upholstered furniture such as sofas or chairs in the bedroom.  ? Use allergy-proof filters for air conditioners and furnaces. Follow product maker's instructions for maintaining and replacing filters.  ? If you can, replace [id number] carpets with wood, tile, or linoleum floors. This is especially important in the bedroom.  Animals  Animals with fur or feathers often make allergens. These are shed as tiny particles called dander. Dander can float through the air or stick to carpet, clothing, and furniture.  ? Choose a pet that doesn t have fur or feathers. Examples are fish and reptiles.  ? Keep pets with fur or feathers out of your home. If you can t do this, be sure to keep them out of your bedroom. But keeping a pet out of your bedroom doesn't mean your bedroom is free of pet allergens. If you sit on the couch in the living room and then go into your bedroom, you have brought the pet allergen there.  ? Wash your hands and clothes after handling pets.  Mold  Mold grows in damp places, such as bathrooms, basements, and closets. It can grow anywhere flooding or a fire has caused water damage. Mold can live behind the walls if there has been water damage.  ? Clean damp areas weekly to prevent mold growth. This includes shower stalls and sinks. You may  need someone to clean these areas for you. Or, try wearing a mask.  ? Run an exhaust fan while bathing. Or leave a window or door open in the bathroom.  ? Repair water leaks in or around your home.  ? Have someone else cut grass or rake leaves, if possible.  ? Don t use vaporizers, or humidifiers. These put water into the air and encourage mold growth.  Pollen  Pollen from trees, grasses, and weeds is a common allergen. Flower pollens are generally not a problem.  ? Try to learn what types of pollen affect you most. Pollen levels vary depending on the plant, the season, and the time of day.  ? Use air conditioning instead of opening the windows in your home or car. In the car, choose the setting to recirculate the air, so less pollen gets in.  ? Have someone else do yardwork, if possible.  ? Change clothes in a mudroom when you get home if you are highly allergic to pollens. This will keep most of the pollen from entering the house.  Cockroaches and mice  Cockroaches and mice are common household pests. They also produce allergens.  ? Keep your kitchen clean and dry. A leaky faucet or drain can attract roaches.  ? Remove garbage from your home daily.  ? Store food in tightly sealed containers. Wash dishes promptly as soon as they are used.  ? Use bait stations or traps to control roaches. Avoid using chemical sprays.  Date Last Reviewed: 10/1/2016    9400-9542 [employer]. [address] [place] [address]. All rights reserved. This information is not intended as a substitute for professional medical care. Always follow your healthcare professional's instructions.                Follow-ups after your visit        Who to contact     If you have questions or need follow up information about today's clinic visit or your schedule please contact [facility] [place] directly at [phone].  Normal or non-critical lab and imaging results will be communicated to you by MyChart, letter or  "phone within 4 business days after the clinic has received the results. If you do not hear from us within 7 days, please contact the clinic through [employer] or phone. If you have a critical or abnormal lab result, we will notify you by phone as soon as possible.  Submit refill requests through [employer] or call your pharmacy and they will forward the refill request to us. Please allow 3 business days for your refill to be completed.          Additional Information About Your Visit        clips[employer]ellos Information     [employer] gives you secure access to your electronic health record. If you see a primary care provider, you can also send messages to your care team and make appointments. If you have questions, please call your primary care clinic.  If you do not have a primary care provider, please call [phone] and they will assist you.        Care EveryWhere ID     This is your Care EveryWhere ID. This could be used by other organizations to access your [facility] medical records  [id number]        Your Vitals Were     Pulse Temperature Height Last Period Pulse Oximetry BMI (Body Mass Index)    100 97.4  F (36.3  C) (Tympanic) 5' 4\" (1.626 m) 11/19/2017 99% 27.29 kg/m2       Blood Pressure from Last 3 Encounters:   12/21/17 128/70   12/05/17 124/86   04/25/17 (!) 133/92    Weight from Last 3 Encounters:   12/21/17 159 lb (72.1 kg)   12/05/17 162 lb (73.5 kg)   04/25/17 162 lb (73.5 kg)              Today, you had the following     No orders found for display         Today's Medication Changes          These changes are accurate as of: 12/21/17  9:58 AM.  If you have any questions, ask your nurse or doctor.               Start taking these medicines.        Dose/Directions    moxifloxacin 400 MG tablet   Commonly known as:  AVELOX   Used for:  Bronchitis   Started by:  Zulema Allred MD        Dose:  400 mg   Take 1 tablet (400 mg) by mouth daily   Quantity:  5 tablet   Refills:  0            Where to get your " medicines      These medications were sent to [facility] - [place], [place] - [address] AT [facility]  & [facility]  [address] [place] [address]     Phone:  [phone]     albuterol 108 (90 BASE) MCG/ACT Inhaler    moxifloxacin 400 MG tablet                Primary Care Provider Office Phone # Fax #    Errol Avtar Delicd [phone] [address] [facility] [address]  [facility] [address]        Equal Access to Services     [facility]: Hadii aad ku hadasho Soomaali, waaxda luqadaha, qaybta kaalmada adeegyada, waxay idiin hayaan adeeg kharacarlos labrandi . So [facility] [phone].    ATENCIÓN: Si habla español, tiene a orozco disposición servicios gratuitos de asistencia lingüística. [facility] [phone].    We comply with applicable federal civil rights laws and [place] laws. We do not discriminate on the basis of race, color, national origin, age, disability, sex, sexual orientation, or gender identity.            Thank you!     Thank you for choosing [facility] [place]  for your care. Our goal is always to provide you with excellent care. Hearing back from our patients is one way we can continue to improve our services. Please take a few minutes to complete the written survey that you may receive in the mail after your visit with us. Thank you!             Your Updated Medication List - Protect others around you: Learn how to safely use, store and throw away your medicines at www.disposemymeds.org.          This list is accurate as of: 12/21/17  9:58 AM.  Always use your most recent med list.                   Brand Name Dispense Instructions for use Diagnosis    albuterol 108 (90 BASE) MCG/ACT Inhaler    PROAIR HFA    1 Inhaler    Inhale 2 puffs into the lungs every 6 hours as needed for shortness of breath / dyspnea Due for office visit: [phone]    Mild intermittent asthma without complication       alfuzosin 10 MG 24 hr tablet     UROXATRAL     Take 1 tablet (10 mg) by mouth daily    Urinary frequency       ASTHMA CONTROLLER MEDICATION NOT PRESCRIBED (INTENTIONAL)     0 each    Asthma controller medication not prescribed intentionally due to Other:not needed    Intermittent asthma       benzonatate 100 MG capsule    TESSALON    42 capsule    Take 1 capsule (100 mg) by mouth 3 times daily as needed for cough    Cough       fluticasone 50 MCG/ACT spray    FLONASE    3 Package    Spray 1-2 sprays into both nostrils daily    Seasonal allergies       moxifloxacin 400 MG tablet    AVELOX    5 tablet    Take 1 tablet (400 mg) by mouth daily    Bronchitis       Multi-vitamin Tabs tablet      Take 1 tablet by mouth daily        PROBIOTIC PO           ZYRTEC-D PO      Take by mouth daily

## 2018-03-01 LAB
ABO + RH BLD: NORMAL
ABO + RH BLD: NORMAL
BLD GP AB SCN SERPL QL: NORMAL
C TRACH DNA SPEC QL PROBE+SIG AMP: NORMAL
HBV SURFACE AG SERPL QL IA: NEGATIVE
HIV 1+2 AB+HIV1 P24 AG SERPL QL IA: NON REACTIVE
N GONORRHOEA DNA SPEC QL PROBE+SIG AMP: NORMAL
RUBELLA ABY IGG: 3.19
T PALLIDUM IGG SER QL: NON REACTIVE

## 2018-03-15 ENCOUNTER — PRE VISIT (OUTPATIENT)
Dept: MATERNAL FETAL MEDICINE | Facility: CLINIC | Age: 38
End: 2018-03-15

## 2018-03-26 ENCOUNTER — OFFICE VISIT (OUTPATIENT)
Dept: MATERNAL FETAL MEDICINE | Facility: CLINIC | Age: 38
End: 2018-03-26
Attending: OBSTETRICS & GYNECOLOGY
Payer: COMMERCIAL

## 2018-03-26 ENCOUNTER — HOSPITAL ENCOUNTER (OUTPATIENT)
Dept: ULTRASOUND IMAGING | Facility: CLINIC | Age: 38
Discharge: HOME | End: 2018-03-26
Attending: OBSTETRICS & GYNECOLOGY | Admitting: OBSTETRICS & GYNECOLOGY
Payer: COMMERCIAL

## 2018-03-26 DIAGNOSIS — O26.90 PREGNANCY RELATED CONDITION, ANTEPARTUM: ICD-10-CM

## 2018-03-26 DIAGNOSIS — O09.511 SUPERVISION OF ELDERLY PRIMIGRAVIDA IN FIRST TRIMESTER: Primary | ICD-10-CM

## 2018-03-26 DIAGNOSIS — O09.819 PREGNANCY RESULTING FROM IN VITRO FERTILIZATION: ICD-10-CM

## 2018-03-26 DIAGNOSIS — O09.811 PREGNANCY RESULTING FROM ASSISTED REPRODUCTIVE TECHNOLOGY, FIRST TRIMESTER: ICD-10-CM

## 2018-03-26 DIAGNOSIS — O09.511 ADVANCED MATERNAL AGE, 1ST PREGNANCY, FIRST TRIMESTER: Primary | ICD-10-CM

## 2018-03-26 PROCEDURE — 96040 ZZH GENETIC COUNSELING, EACH 30 MINUTES: CPT | Mod: ZF | Performed by: GENETIC COUNSELOR, MS

## 2018-03-26 PROCEDURE — 76801 OB US < 14 WKS SINGLE FETUS: CPT

## 2018-03-26 NOTE — MR AVS SNAPSHOT
After Visit Summary   3/26/2018    Kelly Wright    MRN: [id number]           Patient Information     Date Of Birth          1980        Visit Information        Provider Department      3/26/2018 12:15 PM Mirna Fair MD [facility] Maternal Fetal Medicine [facility]        Today's Diagnoses     Advanced maternal age, 1st pregnancy, first trimester    -  1    Pregnancy resulting from assisted reproductive technology, first trimester           Follow-ups after your visit        Your next 10 appointments already scheduled     Apr 30, 2018 11:45 AM CDT   (Arrive by 11:30 AM)   RICHARD US COMP with [email or web address]   [facility] - [facility]    [address] [place] [address]   [phone]           Wear comfortable clothes and leave your valuables at home.            Apr 30, 2018 12:15 PM CDT   Radiology MD with MARLI RAMOS MD   [facility] Maternal Fetal Medicine [facility]    [address] [place] [address]   [phone]           Please arrive at the time given for your first appointment. This visit is used internally to schedule the physician's time during your ultrasound.            May 14, 2018  1:30 PM CDT   (Arrive by 1:15 PM)   RICHARD READ SCREEN FETAL EHCO Betancourt with [email or web address]   [facility] - [facility]    [address] [place] [address]   [phone]            May 14, 2018  2:00 PM CDT   Radiology MD with MARLI RAMOS MD   [facility] Maternal Fetal Medicine [facility]    [address] [place] [address]   [phone]           Please arrive at the time given for your first appointment. This visit is used internally to schedule the physician's time during your ultrasound.              Future tests that were ordered for you  today     Open Future Orders        Priority Expected Expires Ordered    Maternal Fetal US OB Comp 1st Tri Single Routine  1/2/2019 3/2/2018            Who to contact     If you have questions or need follow up information about today's clinic visit or your schedule please contact [facility] directly at [phone].  Normal or non-critical lab and imaging results will be communicated to you by MyChart, letter or phone within 4 business days after the clinic has received the results. If you do not hear from us within 7 days, please contact the clinic through [employer] or phone. If you have a critical or abnormal lab result, we will notify you by phone as soon as possible.  Submit refill requests through [employer] or call your pharmacy and they will forward the refill request to us. Please allow 3 business days for your refill to be completed.          Additional Information About Your Visit        MyChart Information     [employer] gives you secure access to your electronic health record. If you see a primary care provider, you can also send messages to your care team and make appointments. If you have questions, please call your primary care clinic.  If you do not have a primary care provider, please call [phone] and they will assist you.        Care EveryWhere ID     This is your Care EveryWhere ID. This could be used by other organizations to access your [place] [facility] medical records  [id number]        Your Vitals Were     Last Period                   11/19/2017            Blood Pressure from Last 3 Encounters:   12/21/17 128/70   12/05/17 124/86   04/25/17 (!) 133/92    Weight from Last 3 Encounters:   12/21/17 72.1 kg (159 lb)   12/05/17 73.5 kg (162 lb)   04/25/17 73.5 kg (162 lb)              Today, you had the following     No orders found for display       Primary Care Provider Office Phone # Fax #    Errol Delcid [phone] [address]  [facility] [address]        Equal Access to Services     [facility] : Hadii adrienne patiño hennyedis Sotete, wakarlyda luqadaha, qaybta kasalenarex starkservandocarlos keller. So [facility] [phone].    ATENCIÓN: Si habla español, tiene a orozco disposición servicios gratuitos de asistencia lingüística. Kettyame al [phone].    We comply with applicable federal civil rights laws and [place] laws. We do not discriminate on the basis of race, color, national origin, age, disability, sex, sexual orientation, or gender identity.            Thank you!     Thank you for choosing [facility]  for your care. Our goal is always to provide you with excellent care. Hearing back from our patients is one way we can continue to improve our services. Please take a few minutes to complete the written survey that you may receive in the mail after your visit with us. Thank you!             Your Updated Medication List - Protect others around you: Learn how to safely use, store and throw away your medicines at www.disposemymeds.org.          This list is accurate as of 3/26/18 12:44 PM.  Always use your most recent med list.                   Brand Name Dispense Instructions for use Diagnosis    albuterol 108 (90 BASE) MCG/ACT Inhaler    PROAIR HFA    1 Inhaler    Inhale 2 puffs into the lungs every 6 hours as needed for shortness of breath / dyspnea Due for office visit: [phone]    Mild intermittent asthma without complication       alfuzosin 10 MG 24 hr tablet    UROXATRAL     Take 1 tablet (10 mg) by mouth daily    Urinary frequency       ASTHMA CONTROLLER MEDICATION NOT PRESCRIBED (INTENTIONAL)     0 each    Asthma controller medication not prescribed intentionally due to Other:not needed    Intermittent asthma       benzonatate 100 MG capsule    TESSALON    42 capsule    Take 1 capsule (100 mg) by mouth 3 times daily as needed for cough    Cough       fluticasone 50 MCG/ACT spray     FLONASE    3 Package    Spray 1-2 sprays into both nostrils daily    Seasonal allergies       moxifloxacin 400 MG tablet    AVELOX    5 tablet    Take 1 tablet (400 mg) by mouth daily    Bronchitis       Multi-vitamin Tabs tablet      Take 1 tablet by mouth daily        PROBIOTIC PO           ZYRTEC-D PO      Take by mouth daily

## 2018-03-26 NOTE — PROGRESS NOTES
[facility]  Genetic Counseling Consult    Patient:  Kelly Wright YOB: 1980   Date of Service:  3/26/18      Kelly Wright was seen at the [facility] for genetic consultation as part of her appointment for her NT ultrasound.  The indication for genetic counseling is advanced maternal age.       Impression/Plan:   1. Kelly had a cell-free fetal DNA test earlier in pregnancy, which was normal.    2. Kelly had an NT ultrasound today, the results of which were normal.  Please see the ultrasound report for further details.    3. The patient declines genetic amniocentesis and additional maternal serum screening today.     4. Kelly signed a release of records for us to obtain her genetic carrier screening results fertility clinic and asked that we review these records and contact her to discuss additional carrier screening options.      5. A fetal echocardiogram at 20-22 weeks gestational age is recommended for all pregnancies conceived via IVF to assess fetal heart structures.      Pregnancy History:   /Parity:    Age at Delivery: 38 year old  PUMA: 2018, by Other Basis  Gestational Age: 13w4d    No significant complications or exposures were reported in the current pregnancy.    Kelly s pregnancy was conceived via IVF with preimplantation genetic screening after 3 failed cycles of IUI.  This screening technique involves removing a few cells from the developing embryo after fertilization and screening them for chromosome abnormalities.  This testing reduces, but does not completely eliminate, the risks for a pregnancy to be affected any of the chromosome abnormalities screened for.      There is currently no specific cause for the couple's infertility known.    Medical History:   Kelly ambrose reported medical history is not expected to impact pregnancy management or risks to fetal development.        Family History:   A three-generation pedigree was obtained, and is scanned under the  Media  tab.     Kelly was adopted out of her biological family with no information available regarding biological family history.  There are no significant findings in her  Janet's family history.  The reported family history is negative for multiple miscarriages, stillbirths, birth defects, cognitive impairment, known genetic conditions, and consanguinity.       Carrier Screening:   The patient reports that the father of the pregnancy has  ancestry:      Cystic fibrosis is an autosomal recessive genetic condition that occurs with increased frequency in individuals of  ancestry and carrier screening for this condition is available.  In addition,  screening in the [facility] includes cystic fibrosis.    The patient reports that she is of  ancestry:      The hemoglobinopathies are a group of genetic blood diseases that occur with increased frequency in individuals of  ancestry and carrier screening for these conditions is available.  Carrier screening for the hemoglobinopathies includes a CBC with red blood cell indices, a ferritin level, and a quantitative hemoglobin electrophoresis or HPLC.  In addition,  screening in the [facility] includes many of the hemoglobinopathies.      Expanded carrier screening for mutations in a large panel of genes associated with autosomal recessive conditions including cystic fibrosis, spinal muscular atrophy, and others, is now available.      Kelly reports that she had carrier screening done as part of her fertility treatments, and per her report these results were negative.  She reports that she would potentially be interested in additional, expanded carrier screening depending on how comprehensive her initial carrier screening was.  She signed a release of records for us to obtain her carrier screening records for review, and  she will be contacted to discuss once these records become available.         Risk Assessment for Chromosome Conditions:   We explained that the risk for fetal chromosome abnormalities increases with maternal age. We discussed specific features of common chromosome abnormalities, including Down syndrome, trisomy 13, trisomy 18, and sex chromosome trisomies.      - At age 37 at midtrimester, the risk to have a baby with Down syndrome is 1 in 168.     - At age 37 at midtrimester, the risk to have a baby with any chromosome abnormality is 1 in 82.     - At age 38 at delivery, the risk to have a baby with Down syndrome is 1 in 175.     - At age 38 at delivery, the risk to have a baby with any chromosome abnormality is 1 in 103.       Kelly had maternal serum screening earlier in pregnancy.    Non-invasive Prenatal Testing (NIPT)    Maternal plasma cell-free DNA testing    Screens for fetal trisomy 21, trisomy 13, trisomy 18, and sex chromosome aneuploidy    First trimester ultrasound with nuchal translucency and nasal bone assessment was within normal limits.    Kelly had a Informaseq NIPT test earlier in pregnancy; we reviewed the results today, which are normal for chromosome 13, chromosome 18 and chromosome 21 (no aneuploidy detected)    Given the accuracy of this test, these results greatly decrease the chance for certain fetal chromosome abnormalities    We discussed the limitations of normal NIPT results    MSAFP (after 15 weeks for open neural tube defect screening) is recommended         Testing Options:   We discussed the following options:   Genetic Amniocentesis    Invasive procedure typically performed in the second trimester by which amniotic fluid is obtained for the purpose of chromosome analysis and/or other prenatal genetic analysis    Diagnostic results; >99% sensitivity for fetal chromosome abnormalities    AFAFP measurement tests for open neural tube defects       Comprehensive (Level II)  ultrasound:     Detailed ultrasound performed between 18-22 weeks gestation    Screen for major birth defects and markers for aneuploidy    We reviewed the benefits and limitations of this testing.  Screening tests provide a risk assessment specific to the pregnancy for certain fetal chromosome abnormalities, but cannot definitively diagnose or exclude a fetal chromosome abnormality.  Follow-up genetic counseling and consideration of diagnostic testing is recommended with any abnormal screening result. Diagnostic tests carry inherent risks- including risk of miscarriage- that require careful consideration.  These tests can detect fetal chromosome abnormalities with greater than 99% certainty.  Results can be compromised by maternal cell contamination or mosaicism, and are limited by the resolution of cytogenetic G-banding technology.  There is no screening nor diagnostic test that can detect all forms of birth defects or mental disability.    It was a pleasure to be involved with Kelly s care. Face-to-face time of the meeting was 35 minutes.      Osmar Castillo MS, [facility]  Licensed Genetic Counselor  Phone: [phone]  Pager: [phone]

## 2018-03-26 NOTE — MR AVS SNAPSHOT
After Visit Summary   3/26/2018    Kelly Wright    MRN: [id number]           Patient Information     Date Of Birth          1980        Visit Information        Provider Department      3/26/2018 11:00 AM Osmar Castillo GC [facility] Maternal Fetal Medicine [facility]        Today's Diagnoses     Supervision of elderly primigravida in first trimester    -  1    Pregnancy related condition, antepartum        Pregnancy resulting from in vitro fertilization           Follow-ups after your visit        Your next 10 appointments already scheduled     Apr 30, 2018 11:45 AM CDT   (Arrive by 11:30 AM)   RICHARD US COMP with [id number]   [facility] Maternal Fetal Medicine Ultrasound - [facility] ([facility])    [address] [place] [address]   [phone]           Wear comfortable clothes and leave your valuables at home.            Apr 30, 2018 12:15 PM CDT   Radiology MD with MARLI RAMOS MD   [facility] Maternal Fetal Medicine [facility] ([facility])    [address] [place] [address]   [phone]           Please arrive at the time given for your first appointment. This visit is used internally to schedule the physician's time during your ultrasound.            May 14, 2018  1:30 PM CDT   (Arrive by 1:15 PM)   RICHARD READ SCREEN FETAL EHCO Betancourt with [email or web address]   [facility] Maternal Fetal Medicine Ultrasound - [facility] ([facility])    [address] [place] [address]   [phone]            May 14, 2018  2:00 PM CDT   Radiology MD with MARLI RAMOS MD   [facility] Maternal Fetal Medicine [facility] ([facility])    [address] [place] [address]   [phone]           Please arrive at the time given for your first appointment. This visit is used internally to schedule the physician's time during your ultrasound.              Future tests that  were ordered for you today     Open Future Orders        Priority Expected Expires Ordered    Maternal Fetal US OB Comp 1st Tri Single Routine  1/2/2019 3/2/2018            Who to contact     If you have questions or need follow up information about today's clinic visit or your schedule please contact [facility] directly at [phone].  Normal or non-critical lab and imaging results will be communicated to you by MyChart, letter or phone within 4 business days after the clinic has received the results. If you do not hear from us within 7 days, please contact the clinic through M[employer] or phone. If you have a critical or abnormal lab result, we will notify you by phone as soon as possible.  Submit refill requests through [employer] or call your pharmacy and they will forward the refill request to us. Please allow 3 business days for your refill to be completed.          Additional Information About Your Visit        [employer] Information     [employer] gives you secure access to your electronic health record. If you see a primary care provider, you can also send messages to your care team and make appointments. If you have questions, please call your primary care clinic.  If you do not have a primary care provider, please call [phone] and they will assist you.        Care EveryWhere ID     This is your Care EveryWhere ID. This could be used by other organizations to access your [place] medical records  [id number]        Your Vitals Were     Last Period                   11/19/2017            Blood Pressure from Last 3 Encounters:   12/21/17 128/70   12/05/17 124/86   04/25/17 (!) 133/92    Weight from Last 3 Encounters:   12/21/17 72.1 kg (159 lb)   12/05/17 73.5 kg (162 lb)   04/25/17 73.5 kg (162 lb)              We Performed the Following     [facility] Genetic Counseling        Primary Care Provider Office Phone # Fax #    Errol Delcid [phone] [id number] [facility] [address]  [facility] [address]        Equal Access to Services     KENDY COELHO : Hadii adrienne ku hadanam Sotete, wakarlyda luqadaha, qaotiliota kasalenada coreenkatashok, rex morris shaniquenikolay cardenasservandocarlos abreu . So [facility] [phone].    ATENCIÓN: Si habla español, tiene a orozco disposición servicios gratuitos de asistencia lingüística. Llame al [phone].    We comply with applicable federal civil rights laws and [place] laws. We do not discriminate on the basis of race, color, national origin, age, disability, sex, sexual orientation, or gender identity.            Thank you!     Thank you for choosing [facility]  for your care. Our goal is always to provide you with excellent care. Hearing back from our patients is one way we can continue to improve our services. Please take a few minutes to complete the written survey that you may receive in the mail after your visit with us. Thank you!             Your Updated Medication List - Protect others around you: Learn how to safely use, store and throw away your medicines at www.disposemymeds.org.          This list is accurate as of 3/26/18  2:44 PM.  Always use your most recent med list.                   Brand Name Dispense Instructions for use Diagnosis    albuterol 108 (90 BASE) MCG/ACT Inhaler    PROAIR HFA    1 Inhaler    Inhale 2 puffs into the lungs every 6 hours as needed for shortness of breath / dyspnea Due for office visit: [phone]    Mild intermittent asthma without complication       alfuzosin 10 MG 24 hr tablet    UROXATRAL     Take 1 tablet (10 mg) by mouth daily    Urinary frequency       ASTHMA CONTROLLER MEDICATION NOT PRESCRIBED (INTENTIONAL)     0 each    Asthma controller medication not prescribed intentionally due to Other:not needed    Intermittent asthma       benzonatate 100 MG capsule    TESSALON    42 capsule    Take 1 capsule (100 mg) by mouth 3 times daily as needed for cough    Cough       fluticasone 50  MCG/ACT spray    FLONASE    3 Package    Spray 1-2 sprays into both nostrils daily    Seasonal allergies       moxifloxacin 400 MG tablet    AVELOX    5 tablet    Take 1 tablet (400 mg) by mouth daily    Bronchitis       Multi-vitamin Tabs tablet      Take 1 tablet by mouth daily        PROBIOTIC PO           ZYRTEC-D PO      Take by mouth daily

## 2018-03-26 NOTE — PROGRESS NOTES
Please see ultrasound report under imaging tab for details on ultrasound performed today.    Mirna Fair MD  , OB/GYN  Maternal-Fetal Medicine  [email or web address]  [phone] (Academic office)  [phone] (Pager)

## 2018-04-30 ENCOUNTER — HOSPITAL ENCOUNTER (OUTPATIENT)
Dept: ULTRASOUND IMAGING | Facility: CLINIC | Age: 38
Discharge: HOME | End: 2018-04-30
Attending: OBSTETRICS & GYNECOLOGY | Admitting: OBSTETRICS & GYNECOLOGY
Payer: COMMERCIAL

## 2018-04-30 ENCOUNTER — OFFICE VISIT (OUTPATIENT)
Dept: MATERNAL FETAL MEDICINE | Facility: CLINIC | Age: 38
End: 2018-04-30
Attending: OBSTETRICS & GYNECOLOGY
Payer: COMMERCIAL

## 2018-04-30 DIAGNOSIS — O09.512 AMA (ADVANCED MATERNAL AGE) PRIMIGRAVIDA 35+, SECOND TRIMESTER: Primary | ICD-10-CM

## 2018-04-30 DIAGNOSIS — O09.812 PREGNANCY RESULTING FROM ASSISTED REPRODUCTIVE TECHNOLOGY IN SECOND TRIMESTER: ICD-10-CM

## 2018-04-30 DIAGNOSIS — O44.42 LOW LYING PLACENTA NOS OR WITHOUT HEMORRHAGE, SECOND TRIMESTER: ICD-10-CM

## 2018-04-30 DIAGNOSIS — O26.90 PREGNANCY RELATED CONDITION, ANTEPARTUM: ICD-10-CM

## 2018-04-30 PROCEDURE — 76811 OB US DETAILED SNGL FETUS: CPT

## 2018-04-30 NOTE — MR AVS SNAPSHOT
After Visit Summary   4/30/2018    Kelly Wright    MRN: [id number]           Patient Information     Date Of Birth          1980        Visit Information        Provider Department      4/30/2018 12:15 PM Ning Ivey MD [facility] Maternal Fetal Medicine [facility]        Today's Diagnoses     AMA (advanced maternal age) primigravida 35+, second trimester    -  1    Pregnancy resulting from assisted reproductive technology in second trimester        Low lying placenta nos or without hemorrhage, second trimester           Follow-ups after your visit        Your next 10 appointments already scheduled     Jun 04, 2018 11:00 AM CDT   (Arrive by 10:45 AM)   MFM US COMPRE SINGLE F/U with [id number]   [facility] Maternal Fetal Medicine Ultrasound [facility])    [address] [place] [address]   [phone]           Wear comfortable clothes and leave your valuables at home.            Jun 04, 2018 11:45 AM CDT   MFM READ SCREEN FETAL EHCO Betancourt with SHMFMUSR3   [facility] Maternal Fetal Medicine Ultrasound - [facility] ([facility])    [address] [place] [address]   [phone]            Jun 04, 2018 12:15 PM CDT   Radiology MD with  RICHARD MOSS   [facility] Maternal Fetal Medicine [facility] ([facility])    [address] [place] [address]   [phone]           Please arrive at the time given for your first appointment. This visit is used internally to schedule the physician's time during your ultrasound.              Future tests that were ordered for you today     Open Future Orders        Priority Expected Expires Ordered    MFM US Comprehensive Single F/U Routine  4/30/2019 4/30/2018            Who to contact     If you have questions or need follow up information about today's clinic visit or your schedule please contact [facility] directly at [phone].  Normal or non-critical lab and imaging results will be communicated to you by MyChart, letter or phone within 4 business days after the clinic has received the results. If you do not hear from us within 7 days, please contact the clinic through [employer] or phone. If you have a critical or abnormal lab result, we will notify you by phone as soon as possible.  Submit refill requests through [employer] or call your pharmacy and they will forward the refill request to us. Please allow 3 business days for your refill to be completed.          Additional Information About Your Visit        [employer] Information     [employer] gives you secure access to your electronic health record. If you see a primary care provider, you can also send messages to your care team and make appointments. If you have questions, please call your primary care clinic.  If you do not have a primary care provider, please call [phone] and they will assist you.        Care EveryWhere ID     This is your Care EveryWhere ID. This could be used by other organizations to access your [place] medical records  [id number]        Your Vitals Were     Last Period                   11/19/2017            Blood Pressure from Last 3 Encounters:   12/21/17 128/70   12/05/17 124/86   04/25/17 (!) 133/92    Weight from Last 3 Encounters:   12/21/17 72.1 kg (159 lb)   12/05/17 73.5 kg (162 lb)   04/25/17 73.5 kg (162 lb)               Primary Care Provider Office Phone # Fax #    Errol Avtar Delcid [phone] [address] [address]        Equal Access to Services     [facility] AH: Hadii aad ku hadasho Sotete, waaxda luqadaha, qaybta kaalmada hallie, rex keller. So [facility] [phone].    ATENCIÓN: Si habla español, tiene a orozco disposición servicios gratuitos de asistencia lingüística. Llame al [phone].    We comply with applicable federal civil  rights laws and [place] laws. We do not discriminate on the basis of race, color, national origin, age, disability, sex, sexual orientation, or gender identity.            Thank you!     Thank you for choosing [facility]  for your care. Our goal is always to provide you with excellent care. Hearing back from our patients is one way we can continue to improve our services. Please take a few minutes to complete the written survey that you may receive in the mail after your visit with us. Thank you!             Your Updated Medication List - Protect others around you: Learn how to safely use, store and throw away your medicines at www.disposemymeds.org.          This list is accurate as of 4/30/18  1:56 PM.  Always use your most recent med list.                   Brand Name Dispense Instructions for use Diagnosis    albuterol 108 (90 Base) MCG/ACT Inhaler    PROAIR HFA    1 Inhaler    Inhale 2 puffs into the lungs every 6 hours as needed for shortness of breath / dyspnea Due for office visit: [phone]    Mild intermittent asthma without complication       alfuzosin 10 MG 24 hr tablet    UROXATRAL     Take 1 tablet (10 mg) by mouth daily    Urinary frequency       ASTHMA CONTROLLER MEDICATION NOT PRESCRIBED (INTENTIONAL)     0 each    Asthma controller medication not prescribed intentionally due to Other:not needed    Intermittent asthma       benzonatate 100 MG capsule    TESSALON    42 capsule    Take 1 capsule (100 mg) by mouth 3 times daily as needed for cough    Cough       fluticasone 50 MCG/ACT spray    FLONASE    3 Package    Spray 1-2 sprays into both nostrils daily    Seasonal allergies       moxifloxacin 400 MG tablet    AVELOX    5 tablet    Take 1 tablet (400 mg) by mouth daily    Bronchitis       Multi-vitamin Tabs tablet      Take 1 tablet by mouth daily        PROBIOTIC PO           ZYRTEC-D PO      Take by mouth daily

## 2018-05-23 ENCOUNTER — TRANSFERRED RECORDS (OUTPATIENT)
Dept: HEALTH INFORMATION MANAGEMENT | Facility: CLINIC | Age: 38
End: 2018-05-23

## 2018-06-04 ENCOUNTER — HOSPITAL ENCOUNTER (OUTPATIENT)
Dept: ULTRASOUND IMAGING | Facility: CLINIC | Age: 38
End: 2018-06-04
Attending: OBSTETRICS & GYNECOLOGY
Payer: COMMERCIAL

## 2018-06-04 ENCOUNTER — HOSPITAL ENCOUNTER (OUTPATIENT)
Dept: ULTRASOUND IMAGING | Facility: CLINIC | Age: 38
Discharge: HOME | End: 2018-06-04
Attending: OBSTETRICS & GYNECOLOGY | Admitting: OBSTETRICS & GYNECOLOGY
Payer: COMMERCIAL

## 2018-06-04 ENCOUNTER — OFFICE VISIT (OUTPATIENT)
Dept: MATERNAL FETAL MEDICINE | Facility: CLINIC | Age: 38
End: 2018-06-04
Attending: OBSTETRICS & GYNECOLOGY
Payer: COMMERCIAL

## 2018-06-04 DIAGNOSIS — O09.812 PREGNANCY RESULTING FROM IN VITRO FERTILIZATION, SECOND TRIMESTER: Primary | ICD-10-CM

## 2018-06-04 DIAGNOSIS — O26.90 PREGNANCY RELATED CONDITION, ANTEPARTUM: ICD-10-CM

## 2018-06-04 DIAGNOSIS — O09.812 PREGNANCY RESULTING FROM ASSISTED REPRODUCTIVE TECHNOLOGY IN SECOND TRIMESTER: ICD-10-CM

## 2018-06-04 DIAGNOSIS — O16.2 MATERNAL HYPERTENSION IN SECOND TRIMESTER: ICD-10-CM

## 2018-06-04 PROCEDURE — 76816 OB US FOLLOW-UP PER FETUS: CPT

## 2018-06-04 PROCEDURE — 93325 DOPPLER ECHO COLOR FLOW MAPG: CPT

## 2018-06-04 NOTE — PROGRESS NOTES
Please see full imaging report from ViewPoint program under imaging tab.        Tio Haley MD  Maternal Fetal Medicine

## 2018-06-04 NOTE — MR AVS SNAPSHOT
After Visit Summary   6/4/2018    Kelly Wright    MRN: [id number]           Patient Information     Date Of Birth          1980        Visit Information        Provider Department      6/4/2018 12:15 PM Tio Haley MD [place] [facility]        Today's Diagnoses     Pregnancy resulting from in vitro fertilization, second trimester    -  1    Maternal hypertension in second trimester           Follow-ups after your visit        Your next 10 appointments already scheduled     Jun 28, 2018  1:15 PM CDT   New Visit with Tom Knight MD   [facility] ([facility])    [address]  [facility] [id number] [phone] OPT 2              Who to contact     If you have questions or need follow up information about today's clinic visit or your schedule please contact [facility] directly at [phone].  Normal or non-critical lab and imaging results will be communicated to you by [employer], letter or phone within 4 business days after the clinic has received the results. If you do not hear from us within 7 days, please contact the clinic through [employer] or phone. If you have a critical or abnormal lab result, we will notify you by phone as soon as possible.  Submit refill requests through [employer] or call your pharmacy and they will forward the refill request to us. Please allow 3 business days for your refill to be completed.          Additional Information About Your Visit        MyChart Information     [employer] gives you secure access to your electronic health record. If you see a primary care provider, you can also send messages to your care team and make appointments. If you have questions, please call your primary care clinic.  If you do not have a primary care provider, please call [phone] and they will assist you.        Care EveryWhere ID     This is your  Care EveryWhere ID. This could be used by other organizations to access your [place] medical records  [id number]        Your Vitals Were     Last Period                   11/19/2017            Blood Pressure from Last 3 Encounters:   12/21/17 128/70   12/05/17 124/86   04/25/17 (!) 133/92    Weight from Last 3 Encounters:   12/21/17 72.1 kg (159 lb)   12/05/17 73.5 kg (162 lb)   04/25/17 73.5 kg (162 lb)              Today, you had the following     No orders found for display       Primary Care Provider Office Phone # Fax #    Errol Delcid [phone] [address]  [facility] [address]        Equal Access to Services     [facility] Center[facility] AL[facility] : Hadii adrienne patiño hadasho Soomaali, waaxda luqadaha, qaybta kaalmada adeegyada, rex abreu . So [facility] [phone].    ATENCIÓN: Si habla español, tiene a orozco disposición servicios gratuitos de asistencia lingüística. Llame al [phone].    We comply with applicable federal civil rights laws and [place] laws. We do not discriminate on the basis of race, color, national origin, age, disability, sex, sexual orientation, or gender identity.            Thank you!     Thank you for choosing [facility]  for your care. Our goal is always to provide you with excellent care. Hearing back from our patients is one way we can continue to improve our services. Please take a few minutes to complete the written survey that you may receive in the mail after your visit with us. Thank you!             Your Updated Medication List - Protect others around you: Learn how to safely use, store and throw away your medicines at www.disposemymeds.org.          This list is accurate as of 6/4/18  1:20 PM.  Always use your most recent med list.                   Brand Name Dispense Instructions for use Diagnosis    albuterol 108 (90 Base) MCG/ACT Inhaler    PROAIR HFA    1 Inhaler    Inhale 2 puffs into the  lungs every 6 hours as needed for shortness of breath / dyspnea Due for office visit: [phone]    Mild intermittent asthma without complication       alfuzosin 10 MG 24 hr tablet    UROXATRAL     Take 1 tablet (10 mg) by mouth daily    Urinary frequency       ASTHMA CONTROLLER MEDICATION NOT PRESCRIBED (INTENTIONAL)     0 each    Asthma controller medication not prescribed intentionally due to Other:not needed    Intermittent asthma       benzonatate 100 MG capsule    TESSALON    42 capsule    Take 1 capsule (100 mg) by mouth 3 times daily as needed for cough    Cough       fluticasone 50 MCG/ACT spray    FLONASE    3 Package    Spray 1-2 sprays into both nostrils daily    Seasonal allergies       moxifloxacin 400 MG tablet    AVELOX    5 tablet    Take 1 tablet (400 mg) by mouth daily    Bronchitis       Multi-vitamin Tabs tablet      Take 1 tablet by mouth daily        PROBIOTIC PO           ZYRTEC-D PO      Take by mouth daily

## 2018-06-07 ENCOUNTER — DOCUMENTATION ONLY (OUTPATIENT)
Dept: CARDIOLOGY | Facility: CLINIC | Age: 38
End: 2018-06-07

## 2018-06-28 ENCOUNTER — OFFICE VISIT (OUTPATIENT)
Dept: CARDIOLOGY | Facility: CLINIC | Age: 38
End: 2018-06-28
Payer: COMMERCIAL

## 2018-06-28 VITALS
BODY MASS INDEX: 29.16 KG/M2 | DIASTOLIC BLOOD PRESSURE: 83 MMHG | HEIGHT: 64 IN | HEART RATE: 80 BPM | WEIGHT: 170.8 LBS | OXYGEN SATURATION: 96 % | SYSTOLIC BLOOD PRESSURE: 124 MMHG

## 2018-06-28 DIAGNOSIS — R03.0 ELEVATED BLOOD PRESSURE READING WITHOUT DIAGNOSIS OF HYPERTENSION: Primary | ICD-10-CM

## 2018-06-28 DIAGNOSIS — R03.0 ELEVATED BLOOD PRESSURE READING WITHOUT DIAGNOSIS OF HYPERTENSION: ICD-10-CM

## 2018-06-28 DIAGNOSIS — Z3A.27 27 WEEKS GESTATION OF PREGNANCY: ICD-10-CM

## 2018-06-28 LAB
ALBUMIN SERPL-MCNC: 3.2 G/DL (ref 3.4–5)
ALP SERPL-CCNC: 46 U/L (ref 40–150)
ALT SERPL W P-5'-P-CCNC: 20 U/L (ref 0–50)
ANION GAP SERPL CALCULATED.3IONS-SCNC: 9 MMOL/L (ref 3–14)
AST SERPL W P-5'-P-CCNC: 14 U/L (ref 0–45)
BASOPHILS # BLD AUTO: 0 10E9/L (ref 0–0.2)
BASOPHILS NFR BLD AUTO: 0.2 %
BILIRUB SERPL-MCNC: 0.3 MG/DL (ref 0.2–1.3)
BUN SERPL-MCNC: 10 MG/DL (ref 7–30)
CALCIUM SERPL-MCNC: 9 MG/DL (ref 8.5–10.1)
CHLORIDE SERPL-SCNC: 107 MMOL/L (ref 94–109)
CO2 SERPL-SCNC: 23 MMOL/L (ref 20–32)
CREAT SERPL-MCNC: 0.71 MG/DL (ref 0.52–1.04)
DIFFERENTIAL METHOD BLD: NORMAL
EOSINOPHIL # BLD AUTO: 0.1 10E9/L (ref 0–0.7)
EOSINOPHIL NFR BLD AUTO: 1.3 %
ERYTHROCYTE [DISTWIDTH] IN BLOOD BY AUTOMATED COUNT: 13.2 % (ref 10–15)
GFR SERPL CREATININE-BSD FRML MDRD: >90 ML/MIN/1.7M2
GLUCOSE SERPL-MCNC: 109 MG/DL (ref 70–99)
HCT VFR BLD AUTO: 36.5 % (ref 35–47)
HGB BLD-MCNC: 12.7 G/DL (ref 11.7–15.7)
IMM GRANULOCYTES # BLD: 0 10E9/L (ref 0–0.4)
IMM GRANULOCYTES NFR BLD: 0.4 %
LYMPHOCYTES # BLD AUTO: 1.8 10E9/L (ref 0.8–5.3)
LYMPHOCYTES NFR BLD AUTO: 19.6 %
MCH RBC QN AUTO: 29.1 PG (ref 26.5–33)
MCHC RBC AUTO-ENTMCNC: 34.8 G/DL (ref 31.5–36.5)
MCV RBC AUTO: 84 FL (ref 78–100)
MONOCYTES # BLD AUTO: 0.6 10E9/L (ref 0–1.3)
MONOCYTES NFR BLD AUTO: 6.8 %
NEUTROPHILS # BLD AUTO: 6.6 10E9/L (ref 1.6–8.3)
NEUTROPHILS NFR BLD AUTO: 71.7 %
PLATELET # BLD AUTO: 283 10E9/L (ref 150–450)
POTASSIUM SERPL-SCNC: 3.7 MMOL/L (ref 3.4–5.3)
PROT SERPL-MCNC: 6.6 G/DL (ref 6.8–8.8)
RBC # BLD AUTO: 4.37 10E12/L (ref 3.8–5.2)
SODIUM SERPL-SCNC: 139 MMOL/L (ref 133–144)
TSH SERPL DL<=0.005 MIU/L-ACNC: 1.42 MU/L (ref 0.4–4)
WBC # BLD AUTO: 9.2 10E9/L (ref 4–11)

## 2018-06-28 PROCEDURE — 36415 COLL VENOUS BLD VENIPUNCTURE: CPT | Performed by: INTERNAL MEDICINE

## 2018-06-28 PROCEDURE — 84443 ASSAY THYROID STIM HORMONE: CPT | Performed by: INTERNAL MEDICINE

## 2018-06-28 PROCEDURE — 93000 ELECTROCARDIOGRAM COMPLETE: CPT | Performed by: INTERNAL MEDICINE

## 2018-06-28 PROCEDURE — 99204 OFFICE O/P NEW MOD 45 MIN: CPT | Performed by: INTERNAL MEDICINE

## 2018-06-28 PROCEDURE — 80053 COMPREHEN METABOLIC PANEL: CPT | Performed by: INTERNAL MEDICINE

## 2018-06-28 PROCEDURE — 85025 COMPLETE CBC W/AUTO DIFF WBC: CPT | Performed by: INTERNAL MEDICINE

## 2018-06-28 RX ORDER — NIFEDIPINE 90 MG/1
90 TABLET, FILM COATED, EXTENDED RELEASE ORAL DAILY
COMMUNITY
End: 2018-07-11

## 2018-06-28 RX ORDER — PRENATAL VIT/IRON FUM/FOLIC AC 27MG-0.8MG
2 TABLET ORAL 2 TIMES DAILY
COMMUNITY
End: 2022-10-11

## 2018-06-28 NOTE — LETTER
6/28/2018      Errol Delcid MD  [address]  [facility] [address]      RE: Kelly Wright       Dear Colleague,    I had the pleasure of seeing Kelly Wright in the [facility].    Service Date: 06/28/2018      [facility]      REFERRING AND PRIMARY CARE PROVIDER:  Errol Delcid MD      PRIMARY OBSTETRICS TEAM:  Dr. Mayda Dior, Obstetrics, Gynecology and Infertility      REASON FOR VISIT:   1.  Challenging to control pregnancy-induced hypertension.      HISTORY OF PRESENT ILLNESS:    Kelly is new to Cardiology.  She is a very pleasant 37-year-old lady of [place] ancestry, who works as an insurance .  She was unaccompanied today.  She is currently 27 weeks pregnant with her first child, who is a male fetus.  Her BMI is 29.4 kg/m2.      1.  Pregnancy-induced hypertension.      Kelly 27 weeks pregnant with her first pregnancy. She and her  were diagnosed with unexplained infertility. Pregnancy has overall been proceeding well and she does not have much in terms of pregnancy symptoms.  Even prior to the pregnancy, her  blood pressure has been intermittently elevated at various locations, typically when she had gallstone issues or other procedures such as ERCP or cholecystectomy.  At that time it was assumed that her blood pressure was elevated due to her being in pain.  She has not been on any antihypertensive agents prior to pregnancy.      Starting from her second trimester of pregnancy, her blood pressure has been elevated in the 170-180/ mmHg range.  By gradual medication titration, she is currently on a very high dose of labetalol 500 mg b.i.d. and maximum dose of nifedipine ER (Adalat CC) 90 mg daily.  Her resting blood pressure today is 124/83 mmHg with a pulse of 80 BPM.  She also brought a log of her home blood pressure monitors (left arm cuff) recorded on an mateusz on her smart phone.  These are  somewhat higher, ranging between 120-148/80-99 mmHg.      She does not have any side effects from the medications.      Her primary lifestyle contributor appears to be dietary sodium intake.  Yesterday she had a pizza and a burger from [employer], although this is not her usual diet.  Today, she had cheese and crackers, a salad and veggie wrap.  She exercises by doing light jogging 2 times a week and a kettle bell video (7-8 pounds weight).  No alcohol, no tobacco, less than 1 cup of coffee a day.  She is not obese.  Sleeps well.  No elevated stressors in life.  Her  is also healthy. Kelly has not been pregnant before.  No miscarriages.      FAMILY HISTORY:  Not known because she was adopted.      LABS:  We do not have recent labs.      ECG done today shows normal sinus rhythm at 78 BPM with normal cardiac intervals ( milliseconds, QTc 419 milliseconds).      No cardiac imaging available.      CURRENT MEDICATIONS:   1.  Aspirin 81 mg daily.   2.  Labetalol 500 mg twice daily.   3.  Nifedipine ER 90 mg daily.   4.  Daily multivitamin.   5.  Albuterol inhaler.      PHYSICAL EXAMINATION:   VITAL SIGNS:  Blood pressure 124/83, pulse 80 per minute and regular, height 1.626 meters (5 feet 4 inches).  Weight 77.5 kg (170 pounds), respiratory rate 16 per minute, saturations 96% on room air, BMI 29.3 kg/m2.   GENERAL:  essentially unremarkable apart from her gravid uterus.   CONSTITUTIONAL:  Alert, cooperative.  Comfortable at rest.   ENT:  Eyes no pallor.   NECK:  No thyromegaly.  Normal carotid pulses.  No bruit.   LUNGS:  Clear to auscultation.   CARDIOVASCULAR:  Normal JVP.  Normal carotid pulse and displaced apical impulse.  Normal, regular heart sounds.  No murmur, S3 or S4.   ABDOMEN:  Soft, nontender, gravid uterus.   MUSCULOSKELETAL:  Normal.   EXTREMITIES:  No edema.      DIAGNOSES:   1.  Pregnancy-induced hypertension.   2.  27 weeks' gestation of pregnancy.      ASSESSMENT:    I suspect Kelly has  had hypertension even preceding the pregnancy as documented by elevated readings before.  Currently, she is requiring a very high dose of labetalol.  I understand this dose of 500 mg b.i.d. was prescribed due to challenges in controlling the blood pressure, acknowledging that it is a higher than approved dosage.  She is also on maximum dose of nifedipine. No medication-related side effects. Currently, her blood pressure is satisfactory and her pregnancy is progressing well.  However, it is very likely that hypertension will become a problem in the third trimester.  Cutting back on her dietary sodium will be helpful.  Clinical suspicion of sleep apnea is low.      RECOMMENDATIONS:   1.  The patient will try and cut back dietary sodium over the next few days.   2.  A 24-hour blood pressure monitor to be done in 1 week.   3.  Non-fasting labs.   4.  Transthoracic echocardiogram.   5.  Follow up in my clinic to discuss results.  At that time, she will bring her home blood pressure cuff so we can compare it to her office readings.      cc:   Errol Delcid MD   [facility]   [address]   [place] [address]      Mayda Dior MD   [facility]   [address]   [place] [address]         [facility] TRES SPRINGER MD             D: 2018   T: 2018   MT: al      Name:     JAMES PASTRANA   MRN:      [id number]        Account:      [id number]   :      1980           Service Date: 2018      Document: [id number]           Outpatient Encounter Prescriptions as of 2018   Medication Sig Dispense Refill     albuterol (PROAIR HFA) 108 (90 BASE) MCG/ACT Inhaler Inhale 2 puffs into the lungs every 6 hours as needed for shortness of breath / dyspnea Due for office visit: [phone] 1 Inhaler 1     Aspirin (ASPIR-81 PO) Take 81 mg by mouth daily       ASTHMA CONTROLLER MEDICATION NOT PRESCRIBED, INTENTIONAL, Asthma controller medication not  prescribed intentionally due to Other:not needed 0 each 0     Cetirizine-Pseudoephedrine (ZYRTEC-D PO) Take by mouth daily        fluticasone (FLONASE) 50 MCG/ACT nasal spray Spray 1-2 sprays into both nostrils daily 3 Package 3     LABETALOL HCL PO Take 500 mg by mouth 2 times daily       NIFEdipine ER (ADALAT CC) 90 MG TB24 Take 90 mg by mouth daily       Prenatal Vit-Fe Fumarate-FA (PRENATAL MULTIVITAMIN PLUS IRON) 27-0.8 MG TABS per tablet Take 2 tablets by mouth 2 times daily       Probiotic Product (PROBIOTIC PO)        [DISCONTINUED] alfuzosin (UROXATRAL) 10 MG 24 hr tablet Take 1 tablet (10 mg) by mouth daily       [DISCONTINUED] benzonatate (TESSALON) 100 MG capsule Take 1 capsule (100 mg) by mouth 3 times daily as needed for cough 42 capsule 0     [DISCONTINUED] moxifloxacin (AVELOX) 400 MG tablet Take 1 tablet (400 mg) by mouth daily 5 tablet 0     [DISCONTINUED] multivitamin, therapeutic with minerals (MULTI-VITAMIN) TABS Take 1 tablet by mouth daily       No facility-administered encounter medications on file as of 6/28/2018.        Again, thank you for allowing me to participate in the care of your patient.      Sincerely,    Tom Knight MD     [facility]

## 2018-06-28 NOTE — MR AVS SNAPSHOT
After Visit Summary   2018    Kelly Wright    MRN: [id number]           Patient Information     Date Of Birth          1980        Visit Information        Provider Department      2018 1:15 PM Tom Knight MD [facility]        Today's Diagnoses     Elevated blood pressure reading without diagnosis of hypertension    -  1    27 weeks gestation of pregnancy          Care Instructions    ADDITIONAL TESTIN. Nonfasting blood test to be done today.  2. 24 hour blood pressure monitor to be done in one week, after you have cut down on dietary salt intake.  3. Heart ultrasound or transthoracic echocardiogram.    Follow-up in my clinic in 2-3 weeks to discuss results.    If you have any questions or concerns, please call my nurse Evonne Graf at [phone].            Follow-ups after your visit        Additional Services     Follow-Up with Cardiologist                 Future tests that were ordered for you today     Open Future Orders        Priority Expected Expires Ordered    24 Hour Blood Pressure Monitor - Adult Routine 2018    Comprehensive metabolic panel Routine 2018    TSH with free T4 reflex Routine 2018    CBC with platelets differential Routine 2018    Echocardiogram Routine 2018    Follow-Up with Cardiologist Routine 2018            Who to contact     If you have questions or need follow up information about today's clinic visit or your schedule please contact [facility] directly at [phone].  Normal or non-critical lab and imaging results will be communicated to you by MyChart, letter or phone within 4 business days after the clinic has received the results. If you do not hear from us within 7 days, please contact the clinic through  "[employer] or phone. If you have a critical or abnormal lab result, we will notify you by phone as soon as possible.  Submit refill requests through [employer] or call your pharmacy and they will forward the refill request to us. Please allow 3 business days for your refill to be completed.          Additional Information About Your Visit        [employer] Information     [employer] gives you secure access to your electronic health record. If you see a primary care provider, you can also send messages to your care team and make appointments. If you have questions, please call your primary care clinic.  If you do not have a primary care provider, please call [phone] and they will assist you.        Care EveryWhere ID     This is your Care EveryWhere ID. This could be used by other organizations to access your [place] medical records  [id number]        Your Vitals Were     Pulse Height Last Period Pulse Oximetry BMI (Body Mass Index)       80 1.626 m (5' 4\") 11/19/2017 96% 29.32 kg/m2        Blood Pressure from Last 3 Encounters:   06/28/18 124/83   12/21/17 128/70   12/05/17 124/86    Weight from Last 3 Encounters:   06/28/18 77.5 kg (170 lb 12.8 oz)   12/21/17 72.1 kg (159 lb)   12/05/17 73.5 kg (162 lb)              We Performed the Following     EKG 12-lead complete w/read - Clinics          Today's Medication Changes          These changes are accurate as of 6/28/18  2:07 PM.  If you have any questions, ask your nurse or doctor.               Stop taking these medicines if you haven't already. Please contact your care team if you have questions.     alfuzosin 10 MG 24 hr tablet   Commonly known as:  UROXATRAL   Stopped by:  Tom Knight MD           benzonatate 100 MG capsule   Commonly known as:  TESSALON   Stopped by:  Tom Knight MD           moxifloxacin 400 MG tablet   Commonly known as:  AVELOX   Stopped by:  Tom Knight MD           Multi-vitamin Tabs tablet   Stopped by:  Eugene" Tom Velazquez MD                    Primary Care Provider Office Phone # Fax #    Errol Avtar Delcid [phone] [address] [address]        Equal Access to Services     KENDY COELHO : Hadii adrienne ku hadkaylino Soomaali, waaxda luqadaha, qaybta kaalmada adeegyada, rex josein hayaan coreenmary scruggs brady keller. So [facility] [phone].    ATENCIÓN: Si habla español, tiene a orozco disposición servicios gratuitos de asistencia lingüística. Llame al [phone].    We comply with applicable federal civil rights laws and [place] laws. We do not discriminate on the basis of race, color, national origin, age, disability, sex, sexual orientation, or gender identity.            Thank you!     Thank you for choosing [facility]  for your care. Our goal is always to provide you with excellent care. Hearing back from our patients is one way we can continue to improve our services. Please take a few minutes to complete the written survey that you may receive in the mail after your visit with us. Thank you!             Your Updated Medication List - Protect others around you: Learn how to safely use, store and throw away your medicines at www.disposemymeds.org.          This list is accurate as of 6/28/18  2:07 PM.  Always use your most recent med list.                   Brand Name Dispense Instructions for use Diagnosis    albuterol 108 (90 Base) MCG/ACT Inhaler    PROAIR HFA    1 Inhaler    Inhale 2 puffs into the lungs every 6 hours as needed for shortness of breath / dyspnea Due for office visit: [phone]    Mild intermittent asthma without complication       ASPIR-81 PO      Take 81 mg by mouth daily        ASTHMA CONTROLLER MEDICATION NOT PRESCRIBED (INTENTIONAL)     0 each    Asthma controller medication not prescribed intentionally due to Other:not needed    Intermittent asthma       fluticasone 50 MCG/ACT spray    FLONASE    3 Package    Spray  1-2 sprays into both nostrils daily    Seasonal allergies       LABETALOL HCL PO      Take 500 mg by mouth 2 times daily        NIFEdipine ER 90 MG Tb24    ADALAT CC     Take 90 mg by mouth daily        prenatal multivitamin plus iron 27-0.8 MG Tabs per tablet      Take 2 tablets by mouth 2 times daily        PROBIOTIC PO           ZYRTEC-D PO      Take by mouth daily

## 2018-06-28 NOTE — LETTER
6/28/2018    Errol Delcid MD  [address]  [facility] [address]    RE: Kelly Wright       Dear Colleague,    I had the pleasure of seeing Kelly Wright in the [facility].    Dictation reference number - [id number]    REFERRING PROVIDER:  No referring provider defined for this encounter.    PRIMARY CARE PROVIDER:  Errol Delcid  [address]  [facility] [address]            REVIEW OF SYSTEMS:  A comprehensive 10-point review of systems was completed and the pertinent positives are documented in the history of present illness.    Skin:  Negative     Eyes:  Positive for contacts  ENT:  Negative    Respiratory:  Negative    Cardiovascular:  Negative    Gastroenterology: Negative    Genitourinary:  Negative    Musculoskeletal:  Positive for joint pain;back pain  Neurologic:  Positive for numbness or tingling of hands  Psychiatric:  Negative    Heme/Lymph/Imm:  Negative    Endocrine:  Negative      CURRENT MEDICATIONS:  Current Outpatient Prescriptions   Medication Sig Dispense Refill     albuterol (PROAIR HFA) 108 (90 BASE) MCG/ACT Inhaler Inhale 2 puffs into the lungs every 6 hours as needed for shortness of breath / dyspnea Due for office visit: [phone] 1 Inhaler 1     Aspirin (ASPIR-81 PO) Take 81 mg by mouth daily       ASTHMA CONTROLLER MEDICATION NOT PRESCRIBED, INTENTIONAL, Asthma controller medication not prescribed intentionally due to Other:not needed 0 each 0     Cetirizine-Pseudoephedrine (ZYRTEC-D PO) Take by mouth daily        fluticasone (FLONASE) 50 MCG/ACT nasal spray Spray 1-2 sprays into both nostrils daily 3 Package 3     LABETALOL HCL PO Take 500 mg by mouth 2 times daily       NIFEdipine ER (ADALAT CC) 90 MG TB24 Take 90 mg by mouth daily       Prenatal Vit-Fe Fumarate-FA (PRENATAL MULTIVITAMIN PLUS IRON) 27-0.8 MG TABS per tablet Take 2 tablets by mouth 2 times daily       Probiotic Product (PROBIOTIC PO)            ALLERGIES:  Allergies    Allergen Reactions     Amoxicillin Rash     Animal Dander      Diclofenac Nausea and Vomiting     Keflex [Cephalexin Monohydrate] Rash     Seasonal Allergies        PAST MEDICAL HISTORY:    Past Medical History:   Diagnosis Date     Abdominal pain 9/12    elev lft's, then ruq us fatty liver, endoscopic us done 10/12 and dilated ducts.     Arthritis      Cervical dysplasia      Condyloma acuminatum      Elevated liver enzymes 9/12    felt due to fatty liver     Fatty liver 9/12     Fibroids      Gastro-oesophageal reflux disease      H/O cold sores      HPV in female      Intermittent asthma     mostly with allergies     Joint pain 2011    Dr. Dickson     Migraine      Migraines 2007     Pancreatic disease      Pelvic pain in female      PONV (postoperative nausea and vomiting)      Seasonal allergies      Sphincter of Oddi dysfunction 2014    Dr. Ras Juárez, had ercp, eus, panc stents placed     Spondyloarthropathy (H)      Urinary frequency        PAST SURGICAL HISTORY:    Past Surgical History:   Procedure Laterality Date     ENDOSCOPIC RETROGRADE CHOLANGIOPANCREATOGRAM  6/10/2014    Procedure: ENDOSCOPIC RETROGRADE CHOLANGIOPANCREATOGRAM;  Surgeon: Ras Juárez MD;  Location:  OR     ENDOSCOPIC RETROGRADE CHOLANGIOPANCREATOGRAPHY  11/12     ESOPHAGOSCOPY, GASTROSCOPY, DUODENOSCOPY (EGD), COMBINED  7/8/2014    Procedure: COMBINED ESOPHAGOSCOPY, GASTROSCOPY, DUODENOSCOPY (EGD), REMOVE FOREIGN BODY;  Surgeon: Gabbie Menjivar MD;  Location:  GI     GYN SURGERY      [facility] UGI ENDOSCOPY W EUS N/A 5/20/2014    Procedure: COMBINED ENDOSCOPIC ULTRASOUND, ESOPHAGOSCOPY, GASTROSCOPY, DUODENOSCOPY (EGD);  Surgeon: Gabbie Menjivar MD;  Location:  GI     LAPAROSCOPIC CHOLECYSTECTOMY  11/12     LAPAROSCOPIC MYOMECTOMY UTERUS N/A 10/30/2015    Procedure: LAPAROSCOPIC MYOMECTOMY UTERUS;  Surgeon: Mayda Dior MD;  Location:  OR       FAMILY HISTORY:    Family History   Problem Relation  "Age of Onset     Unknown/Adopted Father        SOCIAL HISTORY:    Social History     Social History     Marital status:      Spouse name: Janet     Number of children: 0     Years of education: N/A     Occupational History      [employer]     Social History Main Topics     Smoking status: Never Smoker     Smokeless tobacco: Never Used     Alcohol use 1.8 oz/week     3 Standard drinks or equivalent per week      Comment: 3-5 a week     Drug use: No     Sexual activity: Yes     Partners: Male     Other Topics Concern     None     Social History Narrative       PHYSICAL EXAM:    Vitals: /83  Pulse 80  Ht 1.626 m (5' 4\")  Wt 77.5 kg (170 lb 12.8 oz)  LMP 11/19/2017  SpO2 96%  BMI 29.32 kg/m2  Wt Readings from Last 5 Encounters:   06/28/18 77.5 kg (170 lb 12.8 oz)   12/21/17 72.1 kg (159 lb)   12/05/17 73.5 kg (162 lb)   04/25/17 73.5 kg (162 lb)   01/17/17 74.4 kg (164 lb)       Constitutional: Comfortable at rest. Cooperative, alert and oriented, well developed, well nourished.  Eyes: Pupils equal and round, conjunctivae and lids unremarkable, sclera white, no xanthalasma,   ENT: Satisfactory dentition.  No cyanosis or pallor.  Neck: Carotid pulses are full and equal bilaterally, no carotid bruit, no thyromegaly     Respiratory: Normal respiratory effort with symmetrical chest wall movements and no use of accessory muscles. Good air entry with normal breath sounds and no rales or wheeze.  Cardiovascular: Normal jugular venous pulse and pressure.  Normal carotid pulse character and volume.  No carotid bruit.  Apical impulse is undisplaced and normal to palpation without parasternal heave or thrill.  Heart sounds are normal and regular. No audible murmur. No S3, S4 or friction rub.    GI: Soft, nontender, no hepatosplenomegaly, no masses, active bowel sounds.    Skin: No rash, erythema, ecchymosis.  Musculoskeletal: Normal muscle tone and strength. Normal gait. No spinal " deformities.  Neuropsychiatric: Oriented to time place and person.  Affect normal.  No gross motor deficits.  Extremities: No edema. No clubbing or deformities.        Encounter Diagnoses   Name Primary?     Elevated blood pressure reading without diagnosis of hypertension Yes     27 weeks gestation of pregnancy        Orders Placed This Encounter   Procedures     Comprehensive metabolic panel     TSH with free T4 reflex     CBC with platelets differential     Follow-Up with Cardiologist     EKG 12-lead complete w/read - Clinics     24 Hour Blood Pressure Monitor - Adult     Echocardiogram       CC  REFERRING PROVIDER:  No referring provider defined for this encounter.                  Thank you for allowing me to participate in the care of your patient.      Sincerely,     Tom Knight MD     [facility]    cc:   No referring provider defined for this encounter.

## 2018-06-28 NOTE — PROGRESS NOTES
Service Date: 06/28/2018      LOCATION:  [facility], [facility]      REFERRING AND PRIMARY CARE PROVIDER:  Errol Delcid MD      PRIMARY OBSTETRICS TEAM:  Dr. Mayda Dior, Obstetrics, Gynecology and Infertility      REASON FOR VISIT:   1.  Challenging to control pregnancy-induced hypertension.      HISTORY OF PRESENT ILLNESS:    Kelly is new to Cardiology.  She is a very pleasant 37-year-old lady of [place] ancestry, who works as an insurance .  She was unaccompanied today.  She is currently 27 weeks pregnant with her first child, who is a male fetus.  Her BMI is 29.4 kg/m2.      1.  Pregnancy-induced hypertension.      Kelly 27 weeks pregnant with her first pregnancy. She and her  were diagnosed with unexplained infertility. Pregnancy has overall been proceeding well and she does not have much in terms of pregnancy symptoms.  Even prior to the pregnancy, her  blood pressure has been intermittently elevated at various locations, typically when she had gallstone issues or other procedures such as ERCP or cholecystectomy.  At that time it was assumed that her blood pressure was elevated due to her being in pain.  She has not been on any antihypertensive agents prior to pregnancy.      Starting from her second trimester of pregnancy, her blood pressure has been elevated in the 170-180/ mmHg range.  By gradual medication titration, she is currently on a very high dose of labetalol 500 mg b.i.d. and maximum dose of nifedipine ER (Adalat CC) 90 mg daily.  Her resting blood pressure today is 124/83 mmHg with a pulse of 80 BPM.  She also brought a log of her home blood pressure monitors (left arm cuff) recorded on an mateusz on her smart phone.  These are somewhat higher, ranging between 120-148/80-99 mmHg.      She does not have any side effects from the medications.      Her primary lifestyle contributor appears to be dietary sodium intake.  Yesterday she had a pizza and a  burger from [employer], although this is not her usual diet.  Today, she had cheese and crackers, a salad and veggie wrap.  She exercises by doing light jogging 2 times a week and a kettle bell video (7-8 pounds weight).  No alcohol, no tobacco, less than 1 cup of coffee a day.  She is not obese.  Sleeps well.  No elevated stressors in life.  Her  is also healthy. Kelly has not been pregnant before.  No miscarriages.      FAMILY HISTORY:  Not known because she was adopted.      LABS:  We do not have recent labs.      ECG done today shows normal sinus rhythm at 78 BPM with normal cardiac intervals ( milliseconds, QTc 419 milliseconds).      No cardiac imaging available.      CURRENT MEDICATIONS:   1.  Aspirin 81 mg daily.   2.  Labetalol 500 mg twice daily.   3.  Nifedipine ER 90 mg daily.   4.  Daily multivitamin.   5.  Albuterol inhaler.      PHYSICAL EXAMINATION:   VITAL SIGNS:  Blood pressure 124/83, pulse 80 per minute and regular, height 1.626 meters (5 feet 4 inches).  Weight 77.5 kg (170 pounds), respiratory rate 16 per minute, saturations 96% on room air, BMI 29.3 kg/m2.   GENERAL:  essentially unremarkable apart from her gravid uterus.   CONSTITUTIONAL:  Alert, cooperative.  Comfortable at rest.   ENT:  Eyes no pallor.   NECK:  No thyromegaly.  Normal carotid pulses.  No bruit.   LUNGS:  Clear to auscultation.   CARDIOVASCULAR:  Normal JVP.  Normal carotid pulse and displaced apical impulse.  Normal, regular heart sounds.  No murmur, S3 or S4.   ABDOMEN:  Soft, nontender, gravid uterus.   MUSCULOSKELETAL:  Normal.   EXTREMITIES:  No edema.      DIAGNOSES:   1.  Pregnancy-induced hypertension.   2.  27 weeks' gestation of pregnancy.      ASSESSMENT:    I suspect Kelly has had hypertension even preceding the pregnancy as documented by elevated readings before.  Currently, she is requiring a very high dose of labetalol.  I understand this dose of 500 mg b.i.d. was prescribed due to challenges  in controlling the blood pressure, acknowledging that it is a higher than approved dosage.  She is also on maximum dose of nifedipine. No medication-related side effects. Currently, her blood pressure is satisfactory and her pregnancy is progressing well.  However, it is very likely that hypertension will become a problem in the third trimester.  Cutting back on her dietary sodium will be helpful.  Clinical suspicion of sleep apnea is low.      RECOMMENDATIONS:   1.  The patient will try and cut back dietary sodium over the next few days.   2.  A 24-hour blood pressure monitor to be done in 1 week.   3.  Non-fasting labs.   4.  Transthoracic echocardiogram.   5.  Follow up in my clinic to discuss results.  At that time, she will bring her home blood pressure cuff so we can compare it to her office readings.      cc:   Errol Delcid MD   [facility]   [address]   [place] [id number]      Mayda Dior MD   [facility]   [address]   [place] [id number]         [facility] TRES SPRINGER MD             D: 2018   T: 2018   MT: al      Name:     JAMES PASTRANA   MRN:      [id number]        Account:      [id number]   :      1980           Service Date: 2018      Document: [id number]

## 2018-06-28 NOTE — PATIENT INSTRUCTIONS
ADDITIONAL TESTIN. Non-fasting blood test to be done today.  2. 24 hour blood pressure monitor to be done in one week, after you have cut down on dietary salt intake.  3. Heart ultrasound or transthoracic echocardiogram.    Follow-up in my clinic in 2-3 weeks to discuss results.    If you have any questions or concerns, please call my nurse Evonne Graf at [phone].

## 2018-07-05 ENCOUNTER — HOSPITAL ENCOUNTER (OUTPATIENT)
Dept: CARDIOLOGY | Facility: CLINIC | Age: 38
Discharge: HOME | End: 2018-07-05
Attending: INTERNAL MEDICINE | Admitting: INTERNAL MEDICINE
Payer: COMMERCIAL

## 2018-07-05 DIAGNOSIS — R03.0 ELEVATED BLOOD PRESSURE READING WITHOUT DIAGNOSIS OF HYPERTENSION: ICD-10-CM

## 2018-07-05 PROCEDURE — 93788 AMBL BP MNTR W/SW A/R: CPT

## 2018-07-05 PROCEDURE — 93790 AMBL BP MNTR W/SW I&R: CPT | Performed by: INTERNAL MEDICINE

## 2018-07-06 ENCOUNTER — CARE COORDINATION (OUTPATIENT)
Dept: CARDIOLOGY | Facility: CLINIC | Age: 38
End: 2018-07-06

## 2018-07-06 ENCOUNTER — HOSPITAL ENCOUNTER (OUTPATIENT)
Dept: CARDIOLOGY | Facility: CLINIC | Age: 38
Discharge: HOME | End: 2018-07-06
Attending: INTERNAL MEDICINE | Admitting: INTERNAL MEDICINE
Payer: COMMERCIAL

## 2018-07-06 DIAGNOSIS — R03.0 ELEVATED BLOOD PRESSURE READING WITHOUT DIAGNOSIS OF HYPERTENSION: ICD-10-CM

## 2018-07-06 DIAGNOSIS — Z3A.27 27 WEEKS GESTATION OF PREGNANCY: ICD-10-CM

## 2018-07-06 PROCEDURE — 93306 TTE W/DOPPLER COMPLETE: CPT | Mod: 26 | Performed by: INTERNAL MEDICINE

## 2018-07-06 PROCEDURE — 93306 TTE W/DOPPLER COMPLETE: CPT

## 2018-07-06 NOTE — PROGRESS NOTES
Call from patient stating she is nervous to get results of her echocardiogram.  Reviewed results of echocardiogram with patient.  Patient agrees to keep visits as scheduled with DR Knight on 7/11/2018.  Evonne Chairez RN 07/06/18 2:14 PM

## 2018-07-11 ENCOUNTER — OFFICE VISIT (OUTPATIENT)
Dept: CARDIOLOGY | Facility: CLINIC | Age: 38
End: 2018-07-11
Attending: INTERNAL MEDICINE
Payer: COMMERCIAL

## 2018-07-11 VITALS
HEART RATE: 92 BPM | WEIGHT: 171.8 LBS | HEIGHT: 64 IN | DIASTOLIC BLOOD PRESSURE: 100 MMHG | BODY MASS INDEX: 29.33 KG/M2 | SYSTOLIC BLOOD PRESSURE: 148 MMHG | OXYGEN SATURATION: 98 %

## 2018-07-11 DIAGNOSIS — Z3A.29 29 WEEKS GESTATION OF PREGNANCY: ICD-10-CM

## 2018-07-11 DIAGNOSIS — O13.2 PREGNANCY-INDUCED HYPERTENSION IN SECOND TRIMESTER: Primary | ICD-10-CM

## 2018-07-11 PROCEDURE — 99213 OFFICE O/P EST LOW 20 MIN: CPT | Performed by: INTERNAL MEDICINE

## 2018-07-11 RX ORDER — NIFEDIPINE 90 MG/1
90 TABLET, FILM COATED, EXTENDED RELEASE ORAL
Qty: 100 TABLET | Refills: 3 | COMMUNITY
Start: 2018-07-11 | End: 2018-07-11

## 2018-07-11 RX ORDER — NIFEDIPINE 90 MG/1
90 TABLET, FILM COATED, EXTENDED RELEASE ORAL DAILY
Qty: 100 TABLET | Refills: 3 | COMMUNITY
Start: 2018-07-11 | End: 2022-10-11

## 2018-07-11 NOTE — LETTER
7/11/2018    Errol Delcid MD  [address]  [facility] [address]    RE: Kelly Wright       Dear Colleague,    I had the pleasure of seeing Kelly Wright in the [facility].    Office visit dictated. Dictation reference number - [id number].      Thank you for allowing me to participate in the care of your patient.      Sincerely,     Tom Knight MD     [facility]    cc:   Tom Knight MD  [address] [place] [id number]

## 2018-07-11 NOTE — PATIENT INSTRUCTIONS
1. Change the spacing of your blood pressure medications.  - Take labetalol 500 mg one tablet two times daily (with breakfast and dinner).  - Take nifedipine SR 60 mg with breakfast and 30 mg with dinner.    2. Follow up in my clinic in two weeks. Maintain a blood pressure log.    If you have any questions or concerns, please call my nurse Evonne Graf at [phone].

## 2018-07-11 NOTE — LETTER
7/11/2018      Errol Delcid MD  [address]  [facility] [address]      RE: Kelly Wright       Dear Colleague,    I had the pleasure of seeing Kelly Wright in the [facility].    Service Date: 07/11/2018      PRIMARY CARE AND REFERRING PROVIDER:  Errol Delcid MD      PRIMARY OBSTETRICIAN:  Mayda Dior MD      REASON FOR VISIT:  Pregnancy-induced hypertension.      HISTORY OF PRESENT ILLNESS:  Kelly is known to me from her initial visit on 06/21/2018.  Please see my note for details.  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  She is currently 29 weeks' pregnant with her first child (male fetus).         I am seeing her for pregnancy-induced hypertension.  However, even prior to the pregnancy, her blood pressure has been intermittently elevated at other times when she had an ERCP or a cholecystectomy.  I suspect that she had essential hypertension.  Prior to pregnancy, she was not on any antihypertensive agents.      In order to control her blood pressure, Dr. Dior has had to maintain her on high-dose labetalol 500 mg twice daily and nifedipine ER 90 mg daily, which she is tolerating well without any side effects.      On her last visit, I had requested a 24 hour ambulatory blood pressure monitor, which she has completed.  Just as documented on her home blood pressure log, through most of the morning her blood pressure was satisfactory, but after she finished work around 5-6 p.m., it was as high as 164/110.  Her sleep period blood pressure averages 117/72.  The nocturnal dip is adequate at almost 15%.  Her average pulse is 88 BPM.      Her transthoracic echocardiogram is satisfactory with preserved biventricular systolic function, LVEF 60%-65%, no regional, normal cardiac valves, no Doppler evidence of aortic coarctation, normal ascending aortic dimension of 3.1 cm.      She has significantly cut down dietary  sodium intake.  No lower extremity edema or headaches.  She gets weekly obstetric checks, and the baby is growing well.  BP today is elevated at 148/100 mmHg with a pulse of 92 BPM.  Consistently takes medications.  Less than 1/2 cup of caffeine daily.  She exercises most days by walking/light jogging.  She brought her home blood pressure cuff, which closely collaborates to the office cuff.      CURRENT MEDICATIONS:   1.  Aspirin 81 mg daily.   2.  Labetalol 500 mg b.i.d.   3.  Nifedipine ER 90 mg daily (takes in the evening).   4.  Albuterol inhaler as needed.   5.  Prenatal multivitamins.      PHYSICAL EXAMINATION:   VITAL SIGNS:  Blood pressure 148/100 mmHg, pulse 92 per minute and regular, height 1.626 m (5 feet 4 inches), weight 78 kg (171 pounds), sats 98% on room air.   CONSTITUTIONAL:  Comfortable, alert, oriented.  No periorbital edema.  No lower extremity edema.  I did not do a detailed physical examination today.      DIAGNOSES:     1.  Pregnancy-induced hypertension.     2.   1, para 1, 29 weeks' gestation.      ASSESSMENT:  Kelly's home blood pressure cuff is well calibrated to the office one.  She brought her home BP readings, which she maintains on her smartphone ap.  It is consistent with what her ambulatory blood pressure monitoring showed.  Blood pressure is elevated toward the end of the day.  On further questioning, she tells me that she takes the labetalol with lunch and at bedtime and the nifedipine at bedtime.  When she wakes up in the morning, her blood pressure is very well controlled.  I suspect the primary problem now that she has addressed her dietary sodium intake is variable spacing of her antihypertensive agents.      RECOMMENDATIONS:   1.  Take labetalol 500 mg b.i.d. (breakfast and dinner).   2.  Split the nifedipine SR dosage.  Take 60 mg with breakfast and 30 mg with dinner.     3.  Follow up in my office in 2 weeks to assess blood pressure control.  She will continue to  keep a log.  If her blood pressure remains elevated, I will add alpha methyldopa.        It was my pleasure to visit James, as always.  I look forward to seeing her again.      I spent 30 minutes with the patient; greater than 50% of the time was spent in counseling and coordination of care.      cc:   Errol Delcid MD   [facility]    [address] [place] [address]       Mayda Dior MD   [facility]    [address] [place] [address]         [facility] TRES SPRINGER MD             D: 2018   T: 2018   MT: [email or web address]      Name:     JAMES PASTRANA   MRN:      [id number]        Account:      [id number]   :      1980           Service Date: 2018      Document: [id number]         Outpatient Encounter Prescriptions as of 2018   Medication Sig Dispense Refill     albuterol (PROAIR HFA) 108 (90 BASE) MCG/ACT Inhaler Inhale 2 puffs into the lungs every 6 hours as needed for shortness of breath / dyspnea Due for office visit: [phone] 1 Inhaler 1     Aspirin (ASPIR-81 PO) Take 81 mg by mouth daily       ASTHMA CONTROLLER MEDICATION NOT PRESCRIBED, INTENTIONAL, Asthma controller medication not prescribed intentionally due to Other:not needed 0 each 0     Cetirizine-Pseudoephedrine (ZYRTEC-D PO) Take by mouth daily        fluticasone (FLONASE) 50 MCG/ACT nasal spray Spray 1-2 sprays into both nostrils daily 3 Package 3     LABETALOL HCL PO Take 500 mg by mouth 2 times daily       NIFEdipine ER (ADALAT CC) 90 MG TB24 Take 1 tablet (90 mg) by mouth daily Take 60 mg in the morning and 30 mg in the evening. 100 tablet 3     Prenatal Vit-Fe Fumarate-FA (PRENATAL MULTIVITAMIN PLUS IRON) 27-0.8 MG TABS per tablet Take 2 tablets by mouth 2 times daily       Probiotic Product (PROBIOTIC PO)        [DISCONTINUED] NIFEdipine ER (ADALAT CC) 90 MG TB24 Take 1 tablet (90 mg) by mouth daily (with lunch) 100 tablet 3     [DISCONTINUED] NIFEdipine ER (ADALAT CC) 90  MG TB24 Take 90 mg by mouth daily       No facility-administered encounter medications on file as of 7/11/2018.        Again, thank you for allowing me to participate in the care of your patient.      Sincerely,    Tom Knight MD     [facility]

## 2018-07-11 NOTE — MR AVS SNAPSHOT
After Visit Summary   7/11/2018    Kelly Wright    MRN: [id number]           Patient Information     Date Of Birth          1980        Visit Information        Provider Department      7/11/2018 3:15 PM Tom Knight MD [facility]        Today's Diagnoses     Pregnancy-induced hypertension in second trimester    -  1    Elevated blood pressure reading without diagnosis of hypertension        27 weeks gestation of pregnancy          Care Instructions    1. Change the spacing of your blood pressure medications.  - Take labetalol 500 mg one tablet two times daily (with breakfast and dinner).  - Take nifedipine SR 60 mg with breakfast and 30 mg with dinner.    2. Follow up in my clinic in two weeks. Maintain a blood pressure log.    If you have any questions or concerns, please call my nurse Evonne Graf at [phone].            Follow-ups after your visit        Who to contact     If you have questions or need follow up information about today's clinic visit or your schedule please contact [facility] directly at [phone].  Normal or non-critical lab and imaging results will be communicated to you by [employer], letter or phone within 4 business days after the clinic has received the results. If you do not hear from us within 7 days, please contact the clinic through [employer] or phone. If you have a critical or abnormal lab result, we will notify you by phone as soon as possible.  Submit refill requests through [employer] or call your pharmacy and they will forward the refill request to us. Please allow 3 business days for your refill to be completed.          Additional Information About Your Visit        [employer] Information     [employer] gives you secure access to your electronic health record. If you see a primary care provider, you can also send messages to your care team and make appointments. If you have  "questions, please call your primary care clinic.  If you do not have a primary care provider, please call [phone] and they will assist you.        Care EveryWhere ID     This is your Care EveryWhere ID. This could be used by other organizations to access your [place] medical records  [id number]        Your Vitals Were     Pulse Height Last Period Pulse Oximetry BMI (Body Mass Index)       92 1.626 m (5' 4\") 11/19/2017 98% 29.49 kg/m2        Blood Pressure from Last 3 Encounters:   07/11/18 (!) 148/100   06/28/18 124/83   12/21/17 128/70    Weight from Last 3 Encounters:   07/11/18 77.9 kg (171 lb 12.8 oz)   06/28/18 77.5 kg (170 lb 12.8 oz)   12/21/17 72.1 kg (159 lb)              We Performed the Following     Follow-Up with Cardiologist          Today's Medication Changes          These changes are accurate as of 7/11/18  3:55 PM.  If you have any questions, ask your nurse or doctor.               Start taking these medicines.        Dose/Directions    NIFEdipine ER 90 MG Tb24   Commonly known as:  ADALAT CC   Used for:  Pregnancy-induced hypertension in second trimester   Started by:  Tom Knight MD        Dose:  90 mg   Take 1 tablet (90 mg) by mouth daily Take 60 mg in the morning and 30 mg in the evening.   Quantity:  100 tablet   Refills:  3                Primary Care Provider Office Phone # Fax #    Errol Avtar Delcid [phone] [address] [facility] [address]  [facility] [address]        Equal Access to Services     [facility]: Hadii adrienne inmano Sotete, waaxda luqadaha, qaybta kaalmarex stark . So [facility] [phone].    ATENCIÓN: Si habla español, tiene a orozco disposición servicios gratuitos de asistencia lingüística. Llame al [phone].    We comply with applicable federal civil rights laws and [place] laws. We do not discriminate on the basis of race, color, national origin, age, disability, sex, sexual orientation, or " gender identity.            Thank you!     Thank you for choosing [facility]  for your care. Our goal is always to provide you with excellent care. Hearing back from our patients is one way we can continue to improve our services. Please take a few minutes to complete the written survey that you may receive in the mail after your visit with us. Thank you!             Your Updated Medication List - Protect others around you: Learn how to safely use, store and throw away your medicines at www.disposemymeds.org.          This list is accurate as of 7/11/18  3:55 PM.  Always use your most recent med list.                   Brand Name Dispense Instructions for use Diagnosis    albuterol 108 (90 Base) MCG/ACT Inhaler    PROAIR HFA    1 Inhaler    Inhale 2 puffs into the lungs every 6 hours as needed for shortness of breath / dyspnea Due for office visit: [phone]    Mild intermittent asthma without complication       ASPIR-81 PO      Take 81 mg by mouth daily        ASTHMA CONTROLLER MEDICATION NOT PRESCRIBED (INTENTIONAL)     0 each    Asthma controller medication not prescribed intentionally due to Other:not needed    Intermittent asthma       fluticasone 50 MCG/ACT spray    FLONASE    3 Package    Spray 1-2 sprays into both nostrils daily    Seasonal allergies       LABETALOL HCL PO      Take 500 mg by mouth 2 times daily        NIFEdipine ER 90 MG Tb24    ADALAT CC    100 tablet    Take 1 tablet (90 mg) by mouth daily Take 60 mg in the morning and 30 mg in the evening.    Pregnancy-induced hypertension in second trimester       prenatal multivitamin plus iron 27-0.8 MG Tabs per tablet      Take 2 tablets by mouth 2 times daily        PROBIOTIC PO           ZYRTEC-D PO      Take by mouth daily

## 2018-07-11 NOTE — PROGRESS NOTES
Service Date: 07/11/2018      PRIMARY CARE AND REFERRING PROVIDER:  Errol Delcid MD      PRIMARY OBSTETRICIAN:  Mayda Dior MD      REASON FOR VISIT:  Pregnancy-induced hypertension.      HISTORY OF PRESENT ILLNESS:  Kelly is known to me from her initial visit on 06/21/2018.  Please see my note for details.  She is a very nice, 37-year-old lady of [place] ancestry who works as an insurance .  She is currently 29 weeks' pregnant with her first child (male fetus).         I am seeing her for pregnancy-induced hypertension.  However, even prior to the pregnancy, her blood pressure has been intermittently elevated at other times when she had an ERCP or a cholecystectomy.  I suspect that she had essential hypertension.  Prior to pregnancy, she was not on any antihypertensive agents. In order to control her blood pressure, Dr. Dior has had to maintain her on high-dose labetalol 500 mg twice daily and nifedipine ER 90 mg daily, which she is tolerating well without any side effects. She has significantly cut down dietary sodium intake.  No lower extremity edema or headaches.  She gets weekly obstetric checks, and the baby is growing well. BP today is elevated at 148/100 mmHg with a pulse of 92 BPM.  Consistently takes medications.  Less than 1/2 cup of caffeine daily.  She exercises most days by walking/light jogging.  She brought her home blood pressure cuff, which closely collaborates to the office cuff.      On her last visit, I had requested a 24 hour ambulatory blood pressure monitor, which she has completed.  Just as documented on her home blood pressure log, through most of the morning her blood pressure was satisfactory, but after she finished work around 5-6 p.m., it was as high as 164/110.  Her sleep period blood pressure averages 117/72.  The nocturnal dip is adequate at almost 15%.  Her average pulse is 88 BPM.      Her transthoracic echocardiogram is satisfactory with preserved biventricular  systolic function, LVEF 60%-65%, no regional, normal cardiac valves, no Doppler evidence of aortic coarctation, normal ascending aortic dimension of 3.1 cm.      CURRENT MEDICATIONS:   1.  Aspirin 81 mg daily.   2.  Labetalol 500 mg b.i.d.   3.  Nifedipine ER 90 mg daily (takes in the evening).   4.  Albuterol inhaler as needed.   5.  Prenatal multivitamins.      PHYSICAL EXAMINATION:   VITAL SIGNS:  Blood pressure 148/100 mmHg, pulse 92 per minute and regular, height 1.626 m (5 feet 4 inches), weight 78 kg (171 pounds), sats 98% on room air.   CONSTITUTIONAL:  Comfortable, alert, oriented.  No periorbital edema.  No lower extremity edema.  I did not do a detailed physical examination today.      DIAGNOSES:     1.  Pregnancy-induced hypertension.     2.   1, para 1, 29 weeks' gestation.      ASSESSMENT:    Kelly's home blood pressure cuff is well calibrated to the office one.  She brought her home BP readings, which she maintains on her smartphone ap.  It is consistent with what her ambulatory blood pressure monitoring showed.  Blood pressure is elevated toward the end of the day.  On further questioning, she tells me that she takes the labetalol with lunch and at bedtime and the nifedipine at bedtime.  When she wakes up in the morning, her blood pressure is very well controlled.  I suspect the primary problem now that she has addressed her dietary sodium intake is variable spacing of her antihypertensive agents.      RECOMMENDATIONS:   1.  Take labetalol 500 mg b.i.d. (breakfast and dinner).   2.  Split the nifedipine SR dosage.  Take 60 mg with breakfast and 30 mg with dinner.     3.  Follow up in my office in 2 weeks to assess blood pressure control.  She will continue to keep a log.  If her blood pressure remains elevated, I will add alpha methyldopa.        It was my pleasure to visit Kelly, as always.  I look forward to seeing her again.      I spent 30 minutes with the patient; greater than 50%  of the time was spent in counseling and coordination of care.      cc:   Errol Delcid MD   [facility]    [address] [place] [address]       Mayda Dior MD   [facility]    [address] [place] [address]         [facility] TRES SPRING[facility]             D: 2018   T: 2018   MT: [email or web address]      Name:     JAMES PASTRANA   MRN:      [id number]        Account:      [id number]   :      1980           Service Date: 2018      Document: [id number]

## 2018-08-02 ENCOUNTER — OFFICE VISIT (OUTPATIENT)
Dept: CARDIOLOGY | Facility: CLINIC | Age: 38
End: 2018-08-02
Payer: COMMERCIAL

## 2018-08-02 VITALS
DIASTOLIC BLOOD PRESSURE: 94 MMHG | HEART RATE: 90 BPM | BODY MASS INDEX: 29.19 KG/M2 | HEIGHT: 64 IN | WEIGHT: 171 LBS | SYSTOLIC BLOOD PRESSURE: 150 MMHG

## 2018-08-02 DIAGNOSIS — O13.1 PREGNANCY-INDUCED HYPERTENSION IN FIRST TRIMESTER: Primary | ICD-10-CM

## 2018-08-02 PROCEDURE — 99215 OFFICE O/P EST HI 40 MIN: CPT | Performed by: INTERNAL MEDICINE

## 2018-08-02 RX ORDER — METHYLDOPA 250 MG/1
250 TABLET, FILM COATED ORAL 2 TIMES DAILY
Qty: 40 TABLET | Refills: 3 | Status: SHIPPED | OUTPATIENT
Start: 2018-08-02 | End: 2018-08-13

## 2018-08-02 NOTE — PROGRESS NOTES
Service Date: 08/02/2018      LOCATION:  [facility], [facility].      PRIMARY OBSTETRICIAN AND REFERRING PROVIDER:  Mayda Dior MD      PRIMARY CARE PROVIDER:  Errol Delcid MD      REASON FOR VISIT:   1.  Followup of pregnancy-induced hypertension following medication changes.      HISTORY OF PRESENT ILLNESS:    Kelly is known to me from her initial visit on 06/21/2018.  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  I have been following her for presumed pregnancy-induced hypertension that started in the first trimester, although there is a history that even prior to pregnancy, she has had her blood pressure checked and it was reportedly high.  Her secondary hypertension workup has been negative. Her transthoracic echocardiogram is normal.  Dr. Dior has had to maintain her on high-dose labetalol 500 mg twice daily and maximum dose nifedipine ER (on last visit, I changed to 60 mg in the morning and 30 mg in the evening) to maintain her blood pressure in the 140-150/80-90 range.  There is a history of excess dietary sodium intake but Kelly has been mindful about watching this.  Her 24-hour ambulatory blood pressure monitor also suggested that although her average blood pressure is 117/72, it goes as high as 164/110 in the evenings.      Her blood pressure today remains elevated at 150/94 with a resting pulse of 90 BPM (sinus tachycardia of pregnancy).      Fortunately, she remains completely asymptomatic without any headaches, dizziness, lower extremity edema, shortness of breath.       CURRENT MEDICATIONS:   1.  Aspirin 81 mg daily.   2.  Labetalol 400 mg 2 times daily.   3.  Nifedipine ER 60 mg in the morning and 30 mg in the evening.   4.  Prenatal multivitamin.   5.  Albuterol inhaler as needed.      PHYSICAL EXAMINATION:   VITAL SIGNS:  Blood pressure 150/94 mmHg (rechecked 3 times),  heart rate 90 BPM, height 1.626 meters (5 feet 4 inches), weight 77.6 kg (171 pounds), BMI 29.4 kg/m2.   CONSTITUTIONAL:  Awake, alert, gravid abdomen.   LUNGS:  Clear.   CARDIOVASCULAR:  Regular heart sounds, no murmur.   EXTREMITIES:  Minimal to no edema.      DIAGNOSES:   1.  Pregnancy-induced hypertension in the first trimester.   2.  History of elevated blood pressure without diagnosis of hypertension prior to pregnancy.      ASSESSMENT/PLAN:    I personally called Dr. Mayda Dior,whi is the patient's obstetrician, and discussed management with her.  The patient is 32 weeks pregnant now.  The plan is to induce her at 37 weeks pregnancy due to her consistently elevated blood pressure throughout pregnancy.  I am somewhat concerned that her blood pressure remains high.  I would like to add methyldopa.     1.  Add methyldopa 250 mg twice daily.  Cautioned the patient about side effects of headache and dizziness.  Next week, she will come for a nurse blood pressure check.  If the blood pressure remains above 140/80 mmHg, increase methyldopa to 250 mg 3 times daily (I am out of the office week).  She will see Dr. Dior in 2 weeks and me in 3 weeks.  We will up-titrate the dosage to 500 mg t.i.d., if needed.     2.  Reiterated sodium restriction.     3.  Follow up as above.      It was my pleasure to visit with James.  I wish her all the best for her upcoming delivery.      I spent 40 minutes with the patient, greater than 50% of the time was spent in counseling and coordination of care, including telephone communication with Dr. Mayda Dior.      cc:   Mayda Dior MD   [facility]   [address]   [place] [address]      Errol Delcid MD   [facility] [place]   [address]   [place] [address]         PHIL SPRINGER MD             D: 2018   T: 2018   MT: al      Name:     JAMES PASTRANA   MRN:      [id number]        Account:      [id number]   :       1980           Service Date: 08/02/2018      Document: [id number]

## 2018-08-02 NOTE — PATIENT INSTRUCTIONS
1. Start methyldopa 250 mg. Take one tablet two times daily.    2. Follow up in 3 weeks with labs.    3. Nurse blood pressure check in one week.    If you have any questions or concerns, please call my nurse Evonne Graf at [phone].

## 2018-08-02 NOTE — LETTER
8/2/2018      Mayda Dior MD  [facility] [address]  [facility] [address]      RE: Kelly Wright       Dear Colleague,    I had the pleasure of seeing Kelly Wright in the [facility].    Service Date: 08/02/2018      LOCATION:  [facility], [facility].      PRIMARY OBSTETRICIAN AND REFERRING PROVIDER:  Madya Dior MD      PRIMARY CARE PROVIDER:  Errol Delcid MD      REASON FOR VISIT:   1.  Followup of pregnancy-induced hypertension following medication changes.      HISTORY OF PRESENT ILLNESS:  Kelly is known to me from her initial visit on 06/21/2018.  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  I have been following her for presumed pregnancy-induced hypertension that started in the first trimester, although there is a history that even prior to pregnancy, she has had her blood pressure checked and it was reportedly high.  Her secondary hypertension workup has been negative.  Dr. Dior has had to maintain her on high-dose labetalol 500 mg twice daily and maximum dose nifedipine ER (on last visit, I changed to 60 mg in the morning and 30 mg in the evening) to maintain her blood pressure in the 140-150/80-90 range.  There is a history of excess dietary sodium intake but Kelly has been mindful about watching this.  Her 24-hour ambulatory blood pressure monitor also suggested that although her average blood pressure is 117/72, it goes as high as 164/110 in the evenings.      Her blood pressure today remains elevated at 150/94 with a resting pulse of 90 BPM (sinus tachycardia of pregnancy).      Fortunately, she remains completely asymptomatic without any headaches, dizziness, lower extremity edema, shortness of breath.  Her transthoracic echocardiogram is also normal.      CURRENT MEDICATIONS:   1.  Aspirin 81 mg daily.   2.   Labetalol 400 mg 2 times daily.   3.  Nifedipine ER 60 mg in the morning and 30 mg in the evening.   4.  Prenatal multivitamin.   5.  Albuterol inhaler as needed.      PHYSICAL EXAMINATION:   VITAL SIGNS:  Blood pressure 150/94 mmHg (rechecked 3 times), heart rate 90 BPM, height 1.626 meters (5 feet 4 inches), weight 77.6 kg (171 pounds), BMI 29.4 kg/m2.   CONSTITUTIONAL:  Awake, alert, gravid abdomen.   LUNGS:  Clear.   CARDIOVASCULAR:  Regular heart sounds, no murmur.   EXTREMITIES:  Minimal to no edema.      DIAGNOSES:   1.  Pregnancy-induced hypertension in the first trimester.   2.  History of elevated blood pressure without diagnosis of hypertension prior to pregnancy.      ASSESSMENT/PLAN:  I personally called Dr. Mayda Dior, patient's obstetrician, and discussed management with her.  The patient is 32 weeks pregnant now.  The plan is to induce her at 37 weeks pregnancy due to her consistent elevated blood pressure throughout pregnancy.  I am somewhat concerned that her blood pressure remains high, Although the patient is asymptomatic.  I would like to add methyldopa.   1.  Add methyldopa 250 mg twice daily.  Cautioned the patient about side effects of headache and dizziness.  Next week, she will come for a nurse blood pressure check.  If the blood pressure remains above 140/80 mmHg, increase methyldopa to 250 mg 3 times daily (I am out of the office week).  She will see Dr. Dior in 2 weeks and me in 3 weeks.  We will up-titrate the dosage to 500 mg t.i.d., if needed.   2.  Reiterated sodium restriction.   3.  Follow up as above.      It was my pleasure to visit with Kelly.  I wish her all the best for her upcoming delivery.      I spent 40 minutes with the patient, greater than 50% of the time was spent in counseling and coordination of care, including telephone communication with Dr. Mayda Dior.      cc:   Mayda Dior MD   [facility]   [address]   [place] [address]      Errol  MD Bhavin   [place] [address]   [place] [address]         PHIL KNIGHT MD             D: 2018   T: 2018   MT: al      Name:     JAMES PASTRANA   MRN:      [id number]        Account:      [id number]   :      1980           Service Date: 2018      Document: [id number]         Outpatient Encounter Prescriptions as of 2018   Medication Sig Dispense Refill     albuterol (PROAIR HFA) 108 (90 BASE) MCG/ACT Inhaler Inhale 2 puffs into the lungs every 6 hours as needed for shortness of breath / dyspnea Due for office visit: [phone] 1 Inhaler 1     Aspirin (ASPIR-81 PO) Take 81 mg by mouth daily       ASTHMA CONTROLLER MEDICATION NOT PRESCRIBED, INTENTIONAL, Asthma controller medication not prescribed intentionally due to Other:not needed 0 each 0     Cetirizine-Pseudoephedrine (ZYRTEC-D PO) Take by mouth daily        fluticasone (FLONASE) 50 MCG/ACT nasal spray Spray 1-2 sprays into both nostrils daily 3 Package 3     LABETALOL HCL PO Take 500 mg by mouth 2 times daily       methyldopa (ALDOMET) 250 MG tablet Take 1 tablet (250 mg) by mouth 2 times daily 40 tablet 3     NIFEdipine ER (ADALAT CC) 90 MG TB24 Take 1 tablet (90 mg) by mouth daily Take 60 mg in the morning and 30 mg in the evening. 100 tablet 3     Prenatal Vit-Fe Fumarate-FA (PRENATAL MULTIVITAMIN PLUS IRON) 27-0.8 MG TABS per tablet Take 2 tablets by mouth 2 times daily       Probiotic Product (PROBIOTIC PO)        No facility-administered encounter medications on file as of 2018.        Again, thank you for allowing me to participate in the care of your patient.      Sincerely,    Phil Knight MD     [facility]

## 2018-08-02 NOTE — LETTER
"8/2/2018    Mayda Dior MD  [facility] [address]  [facility] [address]    RE: Kelly Shruthi        Dear Colleague,    I had the pleasure of seeing Kelly Hawkins  in the [facility].    Visit note dictated. Dictation reference number - [id number]        ALLERGIES:  Allergies   Allergen Reactions     Amoxicillin Rash     Animal Dander      Diclofenac Nausea and Vomiting     Keflex [Cephalexin Monohydrate] Rash     Seasonal Allergies          FAMILY HISTORY:    Family History   Problem Relation Age of Onset     Unknown/Adopted Father          PHYSICAL EXAM:    Vitals: BP (!) 150/94 (BP Location: Left arm, Patient Position: Chair, Cuff Size: Adult Regular)  Pulse 90  Ht 1.626 m (5' 4\")  Wt 77.6 kg (171 lb)  LMP 11/19/2017  BMI 29.35 kg/m2  Wt Readings from Last 5 Encounters:   08/02/18 77.6 kg (171 lb)   07/11/18 77.9 kg (171 lb 12.8 oz)   06/28/18 77.5 kg (170 lb 12.8 oz)   12/21/17 72.1 kg (159 lb)   12/05/17 73.5 kg (162 lb)       Thank you for allowing me to participate in the care of your patient.      Sincerely,     Tom Knight MD     [facility]    cc:   Errol Delcid MD  [address]  [place] [address]        "

## 2018-08-02 NOTE — PROGRESS NOTES
"Visit note dictated. Dictation reference number - [id number]        ALLERGIES:  Allergies   Allergen Reactions     Amoxicillin Rash     Animal Dander      Diclofenac Nausea and Vomiting     Keflex [Cephalexin Monohydrate] Rash     Seasonal Allergies          FAMILY HISTORY:    Family History   Problem Relation Age of Onset     Unknown/Adopted Father          PHYSICAL EXAM:    Vitals: BP (!) 150/94 (BP Location: Left arm, Patient Position: Chair, Cuff Size: Adult Regular)  Pulse 90  Ht 1.626 m (5' 4\")  Wt 77.6 kg (171 lb)  LMP 11/19/2017  BMI 29.35 kg/m2  Wt Readings from Last 5 Encounters:   08/02/18 77.6 kg (171 lb)   07/11/18 77.9 kg (171 lb 12.8 oz)   06/28/18 77.5 kg (170 lb 12.8 oz)   12/21/17 72.1 kg (159 lb)   12/05/17 73.5 kg (162 lb)       "

## 2018-08-02 NOTE — MR AVS SNAPSHOT
After Visit Summary   8/2/2018    Kelly Wright    MRN: [id number]           Patient Information     Date Of Birth          1980        Visit Information        Provider Department      8/2/2018 2:45 PM Tom Knight MD [facility]        Today's Diagnoses     Pregnancy-induced hypertension in first trimester    -  1      Care Instructions    1. Start methyldopa 250 mg. Take one tablet two times daily.    2. Follow up in 3 weeks with labs.    3. Nurse blood pressure check in one week.    If you have any questions or concerns, please call my nurse Evonne Graf at [phone].            Follow-ups after your visit        Additional Services     Follow-Up with Cardiologist                 Future tests that were ordered for you today     Open Future Orders        Priority Expected Expires Ordered    Basic metabolic panel Routine 8/23/2018 8/2/2019 8/2/2018    CBC with platelets Routine 8/23/2018 8/2/2019 8/2/2018    N terminal pro BNP outpatient Routine 8/9/2018 8/2/2019 8/2/2018    Follow-Up with Cardiologist Routine 8/23/2018 8/2/2019 8/2/2018            Who to contact     If you have questions or need follow up information about today's clinic visit or your schedule please contact [facility]   [place] directly at [phone].  Normal or non-critical lab and imaging results will be communicated to you by MyChart, letter or phone within 4 business days after the clinic has received the results. If you do not hear from us within 7 days, please contact the clinic through MyChart or phone. If you have a critical or abnormal lab result, we will notify you by phone as soon as possible.  Submit refill requests through Mod[employer] or call your pharmacy and they will forward the refill request to us. Please allow 3 business days for your refill to be completed.          Additional Information About Your Visit        Office Depothart  "Information     Nikos gives you secure access to your electronic health record. If you see a primary care provider, you can also send messages to your care team and make appointments. If you have questions, please call your primary care clinic.  If you do not have a primary care provider, please call [phone] and they will assist you.        Care EveryWhere ID     This is your Care EveryWhere ID. This could be used by other organizations to access your [place] medical records  [id number]        Your Vitals Were     Pulse Height Last Period BMI (Body Mass Index)          90 1.626 m (5' 4\") 11/19/2017 29.35 kg/m2         Blood Pressure from Last 3 Encounters:   08/02/18 (!) 150/94   07/11/18 (!) 148/100   06/28/18 124/83    Weight from Last 3 Encounters:   08/02/18 77.6 kg (171 lb)   07/11/18 77.9 kg (171 lb 12.8 oz)   06/28/18 77.5 kg (170 lb 12.8 oz)                 Today's Medication Changes          These changes are accurate as of 8/2/18  3:24 PM.  If you have any questions, ask your nurse or doctor.               Start taking these medicines.        Dose/Directions    methyldopa 250 MG tablet   Commonly known as:  ALDOMET   Used for:  Pregnancy-induced hypertension in first trimester   Started by:  Tom Knight MD        Dose:  250 mg   Take 1 tablet (250 mg) by mouth 2 times daily   Quantity:  40 tablet   Refills:  3            Where to get your medicines      [facility] [address] [place] [address] [address] & [facility]  [address] [place] [address]    Hours:  24-hours Phone:  [phone]     methyldopa 250 MG tablet                Primary Care Provider Office Phone # Fax #    Mayda Dior [phone]       [facility] [address]  [place] [address]        Equal Access to Services     KENDY COELHO AH: Hadii adrienne Schmidt, waaxda sasha, qaybta kaalrex miller " max cardenasservandocarlos amezquita'aan ah. So [facility] [phone].    ATENCIÓN: Si reji ortega, tiene a orozco disposición servicios gratuitos de asistencia lingüística. Mandeep young [phone].    We comply with applicable federal civil rights laws and [place] laws. We do not discriminate on the basis of race, color, national origin, age, disability, sex, sexual orientation, or gender identity.            Thank you!     Thank you for choosing [facility]  for your care. Our goal is always to provide you with excellent care. Hearing back from our patients is one way we can continue to improve our services. Please take a few minutes to complete the written survey that you may receive in the mail after your visit with us. Thank you!             Your Updated Medication List - Protect others around you: Learn how to safely use, store and throw away your medicines at www.disposemymeds.org.          This list is accurate as of 8/2/18  3:24 PM.  Always use your most recent med list.                   Brand Name Dispense Instructions for use Diagnosis    albuterol 108 (90 Base) MCG/ACT Inhaler    PROAIR HFA    1 Inhaler    Inhale 2 puffs into the lungs every 6 hours as needed for shortness of breath / dyspnea Due for office visit: [phone]    Mild intermittent asthma without complication       ASPIR-81 PO      Take 81 mg by mouth daily        ASTHMA CONTROLLER MEDICATION NOT PRESCRIBED (INTENTIONAL)     0 each    Asthma controller medication not prescribed intentionally due to Other:not needed    Intermittent asthma       fluticasone 50 MCG/ACT spray    FLONASE    3 Package    Spray 1-2 sprays into both nostrils daily    Seasonal allergies       LABETALOL HCL PO      Take 500 mg by mouth 2 times daily        methyldopa 250 MG tablet    ALDOMET    40 tablet    Take 1 tablet (250 mg) by mouth 2 times daily    Pregnancy-induced hypertension in first trimester       NIFEdipine ER 90 MG Tb24    ADALAT CC     100 tablet    Take 1 tablet (90 mg) by mouth daily Take 60 mg in the morning and 30 mg in the evening.    Pregnancy-induced hypertension in second trimester       prenatal multivitamin plus iron 27-0.8 MG Tabs per tablet      Take 2 tablets by mouth 2 times daily        PROBIOTIC PO           ZYRTEC-D PO      Take by mouth daily

## 2018-08-10 ENCOUNTER — ALLIED HEALTH/NURSE VISIT (OUTPATIENT)
Dept: CARDIOLOGY | Facility: CLINIC | Age: 38
End: 2018-08-10
Attending: INTERNAL MEDICINE

## 2018-08-10 DIAGNOSIS — O13.1 PREGNANCY-INDUCED HYPERTENSION IN FIRST TRIMESTER: ICD-10-CM

## 2018-08-10 NOTE — PROGRESS NOTES
Last office visit: 8/2/18    Previous blood pressure:   150/94   Mm Hg     Previous heart rate:  90    bpm    Time of reading:10:43am    Morning medications were taken at: 8:30am    Today's blood pressure:   142/91    mm Hg    Today's heart rate:     89  bpm     Ordering Provider: Dr Knight     Results sent to team # : Pulmonary Hypertension team    Additional comments:

## 2018-08-10 NOTE — MR AVS SNAPSHOT
After Visit Summary   8/10/2018    Kelly Wright    MRN: [id number]           Patient Information     Date Of Birth          1980        Visit Information        Provider Department      8/10/2018 10:30 AM [facility]        Today's Diagnoses     Pregnancy-induced hypertension in first trimester           Follow-ups after your visit        Follow-up notes from your care team     Return for BP Recheck.      Your next 10 appointments already scheduled     Aug 23, 2018 10:15 AM CDT   Return Visit with Tom Knight MD   [facility] ([facility])    [address] [place] [address] [address]  [facility] [address] [phone] OPT 2              Who to contact     If you have questions or need follow up information about today's clinic visit or your schedule please contact [facility] directly at [phone].  Normal or non-critical lab and imaging results will be communicated to you by DailyPathhart, letter or phone within 4 business days after the clinic has received the results. If you do not hear from us within 7 days, please contact the clinic through DailyPathhart or phone. If you have a critical or abnormal lab result, we will notify you by phone as soon as possible.  Submit refill requests through MOOI or call your pharmacy and they will forward the refill request to us. Please allow 3 business days for your refill to be completed.          Additional Information About Your Visit        MyChart Information     MOOI gives you secure access to your electronic health record. If you see a primary care provider, you can also send messages to your care team and make appointments. If you have questions, please call your primary care clinic.  If you do not have a primary care provider, please call [phone] and they will assist you.        Care EveryWhere ID      This is your Care EveryWhere ID. This could be used by other organizations to access your [place] medical records  [id number]        Your Vitals Were     Last Period                   11/19/2017            Blood Pressure from Last 3 Encounters:   08/02/18 (!) 150/94   07/11/18 (!) 148/100   06/28/18 124/83    Weight from Last 3 Encounters:   08/02/18 77.6 kg (171 lb)   07/11/18 77.9 kg (171 lb 12.8 oz)   06/28/18 77.5 kg (170 lb 12.8 oz)              We Performed the Following     Follow-Up with Nurse        Primary Care Provider Office Phone # Fax #    Mayda Dior [phone]       [facility] [address] [facility]  [place] [address]        Equal Access to Services     [facility] LAQUITA : Hadii adrienne patiño hadasho Soomaali, waaxda luqadaha, qaybta kaalmada adeegyada, rex abreu . So [facility] [phone].    ATENCIÓN: Si habla español, tiene a orozco disposición servicios gratuitos de asistencia lingüística. Mandeep al [phone].    We comply with applicable federal civil rights laws and [place] laws. We do not discriminate on the basis of race, color, national origin, age, disability, sex, sexual orientation, or gender identity.            Thank you!     Thank you for choosing [facility]  for your care. Our goal is always to provide you with excellent care. Hearing back from our patients is one way we can continue to improve our services. Please take a few minutes to complete the written survey that you may receive in the mail after your visit with us. Thank you!             Your Updated Medication List - Protect others around you: Learn how to safely use, store and throw away your medicines at www.disposemymeds.org.          This list is accurate as of 8/10/18 10:47 AM.  Always use your most recent med list.                   Brand Name Dispense Instructions for use Diagnosis    albuterol 108 (90 Base) MCG/ACT Inhaler     PROAIR HFA    1 Inhaler    Inhale 2 puffs into the lungs every 6 hours as needed for shortness of breath / dyspnea Due for office visit: [phone]    Mild intermittent asthma without complication       ASPIR-81 PO      Take 81 mg by mouth daily        ASTHMA CONTROLLER MEDICATION NOT PRESCRIBED (INTENTIONAL)     0 each    Asthma controller medication not prescribed intentionally due to Other:not needed    Intermittent asthma       fluticasone 50 MCG/ACT spray    FLONASE    3 Package    Spray 1-2 sprays into both nostrils daily    Seasonal allergies       LABETALOL HCL PO      Take 500 mg by mouth 2 times daily        methyldopa 250 MG tablet    ALDOMET    40 tablet    Take 1 tablet (250 mg) by mouth 2 times daily    Pregnancy-induced hypertension in first trimester       NIFEdipine ER 90 MG Tb24    ADALAT CC    100 tablet    Take 1 tablet (90 mg) by mouth daily Take 60 mg in the morning and 30 mg in the evening.    Pregnancy-induced hypertension in second trimester       prenatal multivitamin plus iron 27-0.8 MG Tabs per tablet      Take 2 tablets by mouth 2 times daily        PROBIOTIC PO           ZYRTEC-D PO      Take by mouth daily

## 2018-08-13 ENCOUNTER — CARE COORDINATION (OUTPATIENT)
Dept: CARDIOLOGY | Facility: CLINIC | Age: 38
End: 2018-08-13

## 2018-08-13 DIAGNOSIS — O13.1 PREGNANCY-INDUCED HYPERTENSION IN FIRST TRIMESTER: ICD-10-CM

## 2018-08-13 RX ORDER — METHYLDOPA 250 MG/1
250 TABLET, FILM COATED ORAL 3 TIMES DAILY
Qty: 30 TABLET | Refills: 3 | COMMUNITY
Start: 2018-08-13 | End: 2018-08-15

## 2018-08-13 NOTE — PROGRESS NOTES
Reviewed BP with Dr. Knight - recommends increasing methyldopa to 25mg three times per day.  Called pt -  Pt checks her BP's at home also.  Pt is feeling well - denies any complaints.  Pt states she saw her OB MD Friday 08/10/2018.  OB MD increased pt's methyldopa to 250mg three times per day on Friday 08/10/2018. Reviewed this is what Dr. Knight recommended also.  Pt states OB MD states baby is doing well, growing on schedule.    Pt states BP this morning 119/88-  States she feels well.  BP yesterday 142/93 in am, 139/94 noon, 139/96 pm  She states these are the average BPs she had noted.  Pt continues on nifedipine 60mg am, 30mg pm  Also labetolol 500mg twice a day  And methyldopa 250mg now taking three times /day  Pt taking ASA 81mg daily  Also using flonase once every day- has not used albuterol for a few months.  Follow up OV with Dr. Knight 08/23/2018 as previously scheduled.  Plan to be induced 09/06/2018 per pt.

## 2018-08-13 NOTE — PROGRESS NOTES
Forwarded to Dr Knight for review per Friday coverage nurse due to hx pregnancy and HTN - see Dr Knight's note.   Sent for urgent review.  Evonne Chairez RN 08/13/18 9:26 AM      Progress Notes  Encounter Date: 8/10/2018  Erasto Vera CMA      []Hide copied text  []Hover for attribution information  Last office visit: 8/2/18     Previous blood pressure:   150/94   Mm Hg      Previous heart rate:  90    bpm     Time of reading:10:43am     Morning medications were taken at: 8:30am     Today's blood pressure:   142/91    mm Hg     Today's heart rate:     89  bpm      Ordering Provider: Dr Knight      Results sent to team # : Pulmonary Hypertension team     Additional comments:               Electronically signed by Erasto Vera CMA at 8/10/2018 10:47 AM

## 2018-08-15 DIAGNOSIS — O13.1 PREGNANCY-INDUCED HYPERTENSION IN FIRST TRIMESTER: ICD-10-CM

## 2018-08-15 RX ORDER — METHYLDOPA 250 MG/1
250 TABLET, FILM COATED ORAL 3 TIMES DAILY
Qty: 270 TABLET | Refills: 0 | Status: SHIPPED | OUTPATIENT
Start: 2018-08-15 | End: 2018-08-23

## 2018-08-23 ENCOUNTER — OFFICE VISIT (OUTPATIENT)
Dept: CARDIOLOGY | Facility: CLINIC | Age: 38
End: 2018-08-23
Attending: INTERNAL MEDICINE
Payer: COMMERCIAL

## 2018-08-23 VITALS
WEIGHT: 174.8 LBS | SYSTOLIC BLOOD PRESSURE: 121 MMHG | HEART RATE: 85 BPM | HEIGHT: 64 IN | BODY MASS INDEX: 29.84 KG/M2 | DIASTOLIC BLOOD PRESSURE: 85 MMHG | OXYGEN SATURATION: 99 %

## 2018-08-23 DIAGNOSIS — Z3A.35 35 WEEKS GESTATION OF PREGNANCY: ICD-10-CM

## 2018-08-23 DIAGNOSIS — O13.1 PREGNANCY-INDUCED HYPERTENSION IN FIRST TRIMESTER: Primary | ICD-10-CM

## 2018-08-23 PROCEDURE — 99213 OFFICE O/P EST LOW 20 MIN: CPT | Performed by: INTERNAL MEDICINE

## 2018-08-23 RX ORDER — METHYLDOPA 500 MG/1
500 TABLET, FILM COATED ORAL 3 TIMES DAILY
Qty: 30 TABLET | Refills: 3 | COMMUNITY
Start: 2018-08-23 | End: 2018-09-04

## 2018-08-23 NOTE — MR AVS SNAPSHOT
After Visit Summary   8/23/2018    Kelly Wright    MRN: [id number]           Patient Information     Date Of Birth          1980        Visit Information        Provider Department      8/23/2018 10:15 AM Tom Knight MD [facility]        Today's Diagnoses     Pregnancy-induced hypertension in first trimester          Care Instructions    1.  Increase methyldopa from 250 mg 3 times daily to 500 mg 3 times daily.    2.  If you have any side effects or big variations in blood pressure, please call my office.    3.  I will plan on seeing you after delivery of the baby, in approximately 8 weeks. You will require pre-visit ECG, blood tests and a heart ultrasound (transthoracic echocardiogram).    If you have any questions or concerns, please call my nurse Evonne Graf at [phone].            Follow-ups after your visit        Additional Services     Follow-Up with Cardiologist                 Future tests that were ordered for you today     Open Future Orders        Priority Expected Expires Ordered    Echocardiogram Routine 10/18/2018 8/23/2019 8/23/2018    Follow-Up with Cardiologist Routine 10/18/2018 8/23/2019 8/23/2018    EKG 12-lead complete w/read - Clinics (to be scheduled) Routine 10/18/2018 8/23/2019 8/23/2018    Basic metabolic panel Routine 10/18/2018 8/23/2019 8/23/2018    CBC with platelets differential Routine 10/18/2018 8/23/2019 8/23/2018            Who to contact     If you have questions or need follow up information about today's clinic visit or your schedule please contact [facility] directly at [phone].  Normal or non-critical lab and imaging results will be communicated to you by MyChart, letter or phone within 4 business days after the clinic has received the results. If you do not hear from us within 7 days, please contact the clinic through MyChart or phone. If you have a  "critical or abnormal lab result, we will notify you by phone as soon as possible.  Submit refill requests through [employer] or call your pharmacy and they will forward the refill request to us. Please allow 3 business days for your refill to be completed.          Additional Information About Your Visit        Enprise Solutionshart Information     [employer] gives you secure access to your electronic health record. If you see a primary care provider, you can also send messages to your care team and make appointments. If you have questions, please call your primary care clinic.  If you do not have a primary care provider, please call [phone] and they will assist you.        Care EveryWhere ID     This is your Care EveryWhere ID. This could be used by other organizations to access your [place] medical records  [id number]        Your Vitals Were     Pulse Height Last Period Pulse Oximetry BMI (Body Mass Index)       85 1.626 m (5' 4\") 11/19/2017 99% 30 kg/m2        Blood Pressure from Last 3 Encounters:   08/23/18 121/85   08/02/18 (!) 150/94   07/11/18 (!) 148/100    Weight from Last 3 Encounters:   08/23/18 79.3 kg (174 lb 12.8 oz)   08/02/18 77.6 kg (171 lb)   07/11/18 77.9 kg (171 lb 12.8 oz)              We Performed the Following     Follow-Up with Cardiologist          Today's Medication Changes          These changes are accurate as of 8/23/18 10:35 AM.  If you have any questions, ask your nurse or doctor.               These medicines have changed or have updated prescriptions.        Dose/Directions    methyldopa 500 MG tablet   Commonly known as:  ALDOMET   This may have changed:    - medication strength  - how much to take   Used for:  Pregnancy-induced hypertension in first trimester   Changed by:  Tom Knight MD        Dose:  500 mg   Take 1 tablet (500 mg) by mouth 3 times daily   Quantity:  30 tablet   Refills:  3                Primary Care Provider Office Phone # Fax #    Mayda Dior [phone] " [phone]       [facility] [address]  [facility] [address]        Equal Access to Services     KENDY COELHO : Hadii aad ku hadkaylinedis Vernaali, wakarlyda domitilagelaha, jannyta kasalenada hallie, rex josein hayaanikolay angelamary scruggs brady keller. So [facility] [phone].    ATENCIÓN: Si habla español, tiene a orozco disposición servicios gratuitos de asistencia lingüística. Llame al [phone].    We comply with applicable federal civil rights laws and [place] laws. We do not discriminate on the basis of race, color, national origin, age, disability, sex, sexual orientation, or gender identity.            Thank you!     Thank you for choosing [facility]  for your care. Our goal is always to provide you with excellent care. Hearing back from our patients is one way we can continue to improve our services. Please take a few minutes to complete the written survey that you may receive in the mail after your visit with us. Thank you!             Your Updated Medication List - Protect others around you: Learn how to safely use, store and throw away your medicines at www.disposemymeds.org.          This list is accurate as of 8/23/18 10:35 AM.  Always use your most recent med list.                   Brand Name Dispense Instructions for use Diagnosis    albuterol 108 (90 Base) MCG/ACT inhaler    PROAIR HFA    1 Inhaler    Inhale 2 puffs into the lungs every 6 hours as needed for shortness of breath / dyspnea Due for office visit: [phone]    Mild intermittent asthma without complication       ASPIR-81 PO      Take 81 mg by mouth daily        ASTHMA CONTROLLER MEDICATION NOT PRESCRIBED (INTENTIONAL)     0 each    Asthma controller medication not prescribed intentionally due to Other:not needed    Intermittent asthma       fluticasone 50 MCG/ACT spray    FLONASE    3 Package    Spray 1-2 sprays into both nostrils daily    Seasonal allergies       LABETALOL HCL PO      Take 500 mg  by mouth 2 times daily        methyldopa 500 MG tablet    ALDOMET    30 tablet    Take 1 tablet (500 mg) by mouth 3 times daily    Pregnancy-induced hypertension in first trimester       NIFEdipine ER 90 MG Tb24    ADALAT CC    100 tablet    Take 1 tablet (90 mg) by mouth daily Take 60 mg in the morning and 30 mg in the evening.    Pregnancy-induced hypertension in second trimester       prenatal multivitamin plus iron 27-0.8 MG Tabs per tablet      Take 2 tablets by mouth 2 times daily        PROBIOTIC PO           ZYRTEC-D PO      Take by mouth daily

## 2018-08-23 NOTE — LETTER
8/23/2018    Mayda Dior MD  [facility] [address]  [facility] [address]    RE: Kelly Wright       Dear Colleague,    I had the pleasure of seeing Kelly Wright in the [facility].      Clinic visit note dictated. Dictation reference number - [id number]    Thank you for allowing me to participate in the care of your patient.      Sincerely,     Tom Knight MD     [facility]    cc:   Tom Knight MD  [address] [place] [id number]

## 2018-08-23 NOTE — LETTER
8/23/2018      Mayda Dior MD  [facility] [address]  [facility] [id number]      RE: Kelly Wright       Dear Colleague,    I had the pleasure of seeing Kelly Wright in the [facility].    Service Date: 08/23/2018      LOCATION:  [facility]      PRIMARY OBSTETRICIAN AND REFERRING PROVIDER:  Mayda iDor MD      PRIMARY CARE PROVIDER:  Errol Delcid MD      REASON FOR VISIT:   1.  Followup of pregnancy-induced hypertension following medication changes.      HISTORY OF PRESENT ILLNESS:    Kelly is well-known to me.  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  I have been following her for presumed pregnancy-induced hypertension that started in the first trimester, although there is a history of elevated blood pressure readings, even prior to pregnancy.  Her secondary hypertension workup has been negative.      We have had significant challenges maintaining her blood pressure and she has been on high-dose labetalol 500 mg twice daily, maximum dose nifedipine ER (60 mg in the morning and 30 mg in the evening) and on her last visit on 08/02/2018, I had started her on alpha-methyldopa, which has gradually been increased to 250 mg 3 times daily.      She has tolerated the methyldopa well without side effects.  Her blood pressure control has improved, although not at goal - it was 121/85 mmHg today but this seems to be an outlier as her home log consistently shows that it is between 130-145/88-95 mmHg.  No symptoms of headaches, dizziness, abnormal leg edema (she has mild bilateral edema that is expected at this stage of pregnancy), abdominal pain, distention, and although I do not have access to it, reportedly, her pre ecclampsia are normal.  The patient has been seeing Dr. Dior's team regularly at 1-week followup.  The  fetus is growing well and she had an ultrasound last week which was reassuring.      CURRENT MEDICATIONS:   1.  Aspirin 81 mg daily.   2.  Labetalol 500 mg 2 times daily.   3.  Alpha-methyldopa 250 mg 3 times daily.   3.  Nifedipine ER (Adalat CC) 60 mg in the morning and 30 mg in the evening.   4.  Prenatal multivitamin.   5.  Albuterol inhaler as needed.      PHYSICAL EXAMINATION:   VITAL SIGNS:  /85, pulse 85 per minute, height 1.626 meters (5 feet 4 inches), weight 79.3 kg (174 pounds), BMI 30.0 kg/m2.   GENERAL:  Alert, oriented x3.   EXTREMITIES:  1+ bilateral pitting edema (no significant change from previous).   CARDIOVASCULAR:  Regular heart sounds with sinus tachycardia of  BPM, no audible murmur.  No S3.   LUNGS:  Clear to auscultation.      DIAGNOSES:   1.  Pregnancy-induced hypertension in the first trimester.   2.  Currently 35 weeks pregnant.   3.  History of elevated blood pressure without diagnosis of hypertension prior to pregnancy.      ASSESSMENT AND PLAN:    1.  Increase methyldopa from 250 mg t.i.d. to 500 mg t.i.d.  Goal blood pressure less than 130/80 mmHg.   2.  I discussed patient's care with her obstetrician, Dr. Mayda Dior. An elective induction is planned in 2 weeks.  I suspect after that, antihypertensive requirement will be drastically reduced but blood pressure may not noprmalize without medication.   3.  Followup appointment with me in 8 weeks with pre-visit ECG, labs and echocardiogram.  If at any time, she has clinical deterioration, especially with symptoms of heart failure, she will require an echocardiogram to rule out pregnancy-associated cardiomyopathy.   4.  I wish Kelly all the best with her upcoming delivery.  I look forward to seeing her and the baby.      cc:   Mayda Dior MD   [facility]   [address]   [place] [id number]      Errol Delcid MD   [facility]   [address]   [place] [id number]         PHIL  TRES KNIGHT MD             D: 2018   T: 2018   MT: al      Name:     JAMES PASTRANA   MRN:      [id number]        Account:      [id number]   :      1980           Service Date: 2018      Document: [id number]           Outpatient Encounter Prescriptions as of 2018   Medication Sig Dispense Refill     albuterol (PROAIR HFA) 108 (90 BASE) MCG/ACT Inhaler Inhale 2 puffs into the lungs every 6 hours as needed for shortness of breath / dyspnea Due for office visit: [phone] 1 Inhaler 1     Aspirin (ASPIR-81 PO) Take 81 mg by mouth daily       ASTHMA CONTROLLER MEDICATION NOT PRESCRIBED, INTENTIONAL, Asthma controller medication not prescribed intentionally due to Other:not needed 0 each 0     Cetirizine-Pseudoephedrine (ZYRTEC-D PO) Take by mouth daily        fluticasone (FLONASE) 50 MCG/ACT nasal spray Spray 1-2 sprays into both nostrils daily 3 Package 3     LABETALOL HCL PO Take 500 mg by mouth 2 times daily       methyldopa (ALDOMET) 500 MG tablet Take 1 tablet (500 mg) by mouth 3 times daily 30 tablet 3     NIFEdipine ER (ADALAT CC) 90 MG TB24 Take 1 tablet (90 mg) by mouth daily Take 60 mg in the morning and 30 mg in the evening. 100 tablet 3     Prenatal Vit-Fe Fumarate-FA (PRENATAL MULTIVITAMIN PLUS IRON) 27-0.8 MG TABS per tablet Take 2 tablets by mouth 2 times daily       Probiotic Product (PROBIOTIC PO)        [DISCONTINUED] methyldopa (ALDOMET) 250 MG tablet Take 1 tablet (250 mg) by mouth 3 times daily 270 tablet 0     No facility-administered encounter medications on file as of 2018.        Again, thank you for allowing me to participate in the care of your patient.      Sincerely,    Tom Knight MD     [facility]

## 2018-08-23 NOTE — PROGRESS NOTES
Service Date: 08/23/2018      LOCATION:  [facility]      PRIMARY OBSTETRICIAN AND REFERRING PROVIDER:  Mayda Dior MD      PRIMARY CARE PROVIDER:  Errol Delcid MD      REASON FOR VISIT:   1.  Followup of pregnancy-induced hypertension following medication changes.      HISTORY OF PRESENT ILLNESS:    Kelly is well-known to me.  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  I have been following her for presumed pregnancy-induced hypertension that started in the first trimester, although there is a history of elevated blood pressure readings, even prior to pregnancy.  Her secondary hypertension workup has been negative.      We have had significant challenges maintaining her blood pressure and she has been on high-dose labetalol 500 mg twice daily, maximum dose nifedipine ER (60 mg in the morning and 30 mg in the evening) and on her last visit on 08/02/2018, I had started her on alpha-methyldopa, which has gradually been increased to 250 mg 3 times daily.      She has tolerated the methyldopa well without side effects.  Her blood pressure control has improved, although not at goal - it was 121/85 mmHg today but this seems to be an outlier as her home log consistently shows that it is between 130-145/88-95 mmHg.  No symptoms of headaches, dizziness, abnormal leg edema (she has mild bilateral edema that is expected at this stage of pregnancy), abdominal pain, distention, and although I do not have access to it, reportedly, her pre ecclampsia are normal.  The patient has been seeing Dr. Dior's team regularly at 1-week followup.  The fetus is growing well and she had an ultrasound last week which was reassuring.      CURRENT MEDICATIONS:   1.  Aspirin 81 mg daily.   2.  Labetalol 500 mg 2 times daily.   3.  Alpha-methyldopa 250 mg 3 times daily.   3.  Nifedipine ER (Adalat CC) 60 mg in  the morning and 30 mg in the evening.   4.  Prenatal multivitamin.   5.  Albuterol inhaler as needed.      PHYSICAL EXAMINATION:   VITAL SIGNS:  /85, pulse 85 per minute, height 1.626 meters (5 feet 4 inches), weight 79.3 kg (174 pounds), BMI 30.0 kg/m2.   GENERAL:  Alert, oriented x3.   EXTREMITIES:  1+ bilateral pitting edema (no significant change from previous).   CARDIOVASCULAR:  Regular heart sounds with sinus tachycardia of  BPM, no audible murmur.  No S3.   LUNGS:  Clear to auscultation.      DIAGNOSES:   1.  Pregnancy-induced hypertension in the first trimester.   2.  Currently 35 weeks pregnant.   3.  History of elevated blood pressure without diagnosis of hypertension prior to pregnancy.      ASSESSMENT AND PLAN:    1.  Increase methyldopa from 250 mg t.i.d. to 500 mg t.i.d.  Goal blood pressure less than 130/80 mmHg.   2.  I discussed patient's care with her obstetrician, Dr. Mayda Dior. An elective induction is planned in 2 weeks.  I suspect after that, antihypertensive requirement will be drastically reduced but blood pressure may not noprmalize without medication.   3.  Followup appointment with me in 8 weeks with pre-visit ECG, labs and echocardiogram.  If at any time, she has clinical deterioration, especially with symptoms of heart failure, she will require an echocardiogram to rule out pregnancy-associated cardiomyopathy.   4.  I wish James all the best with her upcoming delivery.  I look forward to seeing her and the baby.      cc:   Mayda Dior MD   [facility]   [address]   [place] [id number]      Errol Delcid MD   [facility]   [address]   [place] [id number]         [facility] TRES SPRINGER MD             D: 2018   T: 2018   MT: al      Name:     JAMES PASTRANA   MRN:      [id number]        Account:      [id number]   :      1980           Service Date: 2018      Document: [id number]

## 2018-08-23 NOTE — PATIENT INSTRUCTIONS
1.  Increase methyldopa from 250 mg 3 times daily to 500 mg 3 times daily.    2.  If you have any side effects or big variations in blood pressure, please call my office.    3.  I will plan on seeing you after delivery of the baby, in approximately 8 weeks. You will require pre-visit ECG, blood tests and a heart ultrasound (transthoracic echocardiogram).    If you have any questions or concerns, please call my nurse Evonne Graf at [phone].

## 2018-08-30 LAB — GROUP B STREP PCR: NORMAL

## 2018-09-04 ENCOUNTER — HOSPITAL ENCOUNTER (INPATIENT)
Facility: CLINIC | Age: 38
LOS: 4 days | Discharge: HOME HEALTH | End: 2018-09-08
Attending: OBSTETRICS & GYNECOLOGY | Admitting: OBSTETRICS & GYNECOLOGY
Payer: COMMERCIAL

## 2018-09-04 DIAGNOSIS — O13.1 PREGNANCY-INDUCED HYPERTENSION IN FIRST TRIMESTER: ICD-10-CM

## 2018-09-04 DIAGNOSIS — Z98.891 S/P CESAREAN SECTION: Primary | ICD-10-CM

## 2018-09-04 DIAGNOSIS — O13.9 PREGNANCY INDUCED HYPERTENSION, ANTEPARTUM: ICD-10-CM

## 2018-09-04 PROBLEM — O14.90 PRE-ECLAMPSIA: Status: ACTIVE | Noted: 2018-09-04

## 2018-09-04 LAB
ABO + RH BLD: NORMAL
ABO + RH BLD: NORMAL
ALT SERPL W P-5'-P-CCNC: 20 U/L (ref 0–50)
AST SERPL W P-5'-P-CCNC: 23 U/L (ref 0–45)
BLD GP AB SCN SERPL QL: NORMAL
BLOOD BANK CMNT PATIENT-IMP: NORMAL
CREAT SERPL-MCNC: 0.78 MG/DL (ref 0.52–1.04)
CREAT UR-MCNC: 37 MG/DL
ERYTHROCYTE [DISTWIDTH] IN BLOOD BY AUTOMATED COUNT: 13.3 % (ref 10–15)
GFR SERPL CREATININE-BSD FRML MDRD: 83 ML/MIN/1.7M2
HCT VFR BLD AUTO: 36.5 % (ref 35–47)
HGB BLD-MCNC: 12.6 G/DL (ref 11.7–15.7)
MCH RBC QN AUTO: 29 PG (ref 26.5–33)
MCHC RBC AUTO-ENTMCNC: 34.5 G/DL (ref 31.5–36.5)
MCV RBC AUTO: 84 FL (ref 78–100)
PLATELET # BLD AUTO: 201 10E9/L (ref 150–450)
PROT UR-MCNC: 0.12 G/L
PROT/CREAT 24H UR: 0.31 G/G CR (ref 0–0.2)
RBC # BLD AUTO: 4.34 10E12/L (ref 3.8–5.2)
SPECIMEN EXP DATE BLD: NORMAL
URATE SERPL-MCNC: 3.5 MG/DL (ref 2.6–6)
WBC # BLD AUTO: 6.9 10E9/L (ref 4–11)

## 2018-09-04 PROCEDURE — 25000128 H RX IP 250 OP 636: Performed by: OBSTETRICS & GYNECOLOGY

## 2018-09-04 PROCEDURE — 84550 ASSAY OF BLOOD/URIC ACID: CPT | Performed by: OBSTETRICS & GYNECOLOGY

## 2018-09-04 PROCEDURE — 84460 ALANINE AMINO (ALT) (SGPT): CPT | Performed by: OBSTETRICS & GYNECOLOGY

## 2018-09-04 PROCEDURE — 3E0P7VZ INTRODUCTION OF HORMONE INTO FEMALE REPRODUCTIVE, VIA NATURAL OR ARTIFICIAL OPENING: ICD-10-PCS | Performed by: OBSTETRICS & GYNECOLOGY

## 2018-09-04 PROCEDURE — 86850 RBC ANTIBODY SCREEN: CPT | Performed by: OBSTETRICS & GYNECOLOGY

## 2018-09-04 PROCEDURE — 85027 COMPLETE CBC AUTOMATED: CPT | Performed by: OBSTETRICS & GYNECOLOGY

## 2018-09-04 PROCEDURE — 86900 BLOOD TYPING SEROLOGIC ABO: CPT | Performed by: OBSTETRICS & GYNECOLOGY

## 2018-09-04 PROCEDURE — 86780 TREPONEMA PALLIDUM: CPT | Performed by: OBSTETRICS & GYNECOLOGY

## 2018-09-04 PROCEDURE — 25000128 H RX IP 250 OP 636

## 2018-09-04 PROCEDURE — G0463 HOSPITAL OUTPT CLINIC VISIT: HCPCS

## 2018-09-04 PROCEDURE — 12000029 ZZH R&B OB INTERMEDIATE

## 2018-09-04 PROCEDURE — 36415 COLL VENOUS BLD VENIPUNCTURE: CPT | Performed by: OBSTETRICS & GYNECOLOGY

## 2018-09-04 PROCEDURE — 25000132 ZZH RX MED GY IP 250 OP 250 PS 637: Performed by: OBSTETRICS & GYNECOLOGY

## 2018-09-04 PROCEDURE — 84450 TRANSFERASE (AST) (SGOT): CPT | Performed by: OBSTETRICS & GYNECOLOGY

## 2018-09-04 PROCEDURE — 84156 ASSAY OF PROTEIN URINE: CPT | Performed by: OBSTETRICS & GYNECOLOGY

## 2018-09-04 PROCEDURE — 82565 ASSAY OF CREATININE: CPT | Performed by: OBSTETRICS & GYNECOLOGY

## 2018-09-04 PROCEDURE — 86901 BLOOD TYPING SEROLOGIC RH(D): CPT | Performed by: OBSTETRICS & GYNECOLOGY

## 2018-09-04 RX ORDER — OXYCODONE AND ACETAMINOPHEN 5; 325 MG/1; MG/1
1 TABLET ORAL
Status: DISCONTINUED | OUTPATIENT
Start: 2018-09-04 | End: 2018-09-05

## 2018-09-04 RX ORDER — MAGNESIUM SULFATE IN WATER 40 MG/ML
2 INJECTION, SOLUTION INTRAVENOUS CONTINUOUS
Status: DISCONTINUED | OUTPATIENT
Start: 2018-09-04 | End: 2018-09-05

## 2018-09-04 RX ORDER — METHYLERGONOVINE MALEATE 0.2 MG/ML
200 INJECTION INTRAVENOUS
Status: DISCONTINUED | OUTPATIENT
Start: 2018-09-04 | End: 2018-09-05

## 2018-09-04 RX ORDER — MAGNESIUM SULFATE HEPTAHYDRATE 40 MG/ML
2 INJECTION, SOLUTION INTRAVENOUS
Status: DISCONTINUED | OUTPATIENT
Start: 2018-09-04 | End: 2018-09-05

## 2018-09-04 RX ORDER — LORAZEPAM 2 MG/ML
2 INJECTION INTRAMUSCULAR
Status: DISCONTINUED | OUTPATIENT
Start: 2018-09-04 | End: 2018-09-08 | Stop reason: HOSPADM

## 2018-09-04 RX ORDER — CARBOPROST TROMETHAMINE 250 UG/ML
250 INJECTION, SOLUTION INTRAMUSCULAR
Status: COMPLETED | OUTPATIENT
Start: 2018-09-04 | End: 2018-09-05

## 2018-09-04 RX ORDER — NALOXONE HYDROCHLORIDE 0.4 MG/ML
.1-.4 INJECTION, SOLUTION INTRAMUSCULAR; INTRAVENOUS; SUBCUTANEOUS
Status: DISCONTINUED | OUTPATIENT
Start: 2018-09-04 | End: 2018-09-05

## 2018-09-04 RX ORDER — MAGNESIUM SULFATE HEPTAHYDRATE 500 MG/ML
4 INJECTION, SOLUTION INTRAMUSCULAR; INTRAVENOUS
Status: DISCONTINUED | OUTPATIENT
Start: 2018-09-04 | End: 2018-09-05

## 2018-09-04 RX ORDER — LABETALOL 200 MG/1
600 TABLET, FILM COATED ORAL EVERY 12 HOURS SCHEDULED
Status: DISCONTINUED | OUTPATIENT
Start: 2018-09-04 | End: 2018-09-05

## 2018-09-04 RX ORDER — ACETAMINOPHEN 325 MG/1
650 TABLET ORAL EVERY 4 HOURS PRN
Status: DISCONTINUED | OUTPATIENT
Start: 2018-09-04 | End: 2018-09-05

## 2018-09-04 RX ORDER — METHYLDOPA 250 MG/1
500 TABLET, FILM COATED ORAL 3 TIMES DAILY
Status: DISCONTINUED | OUTPATIENT
Start: 2018-09-04 | End: 2018-09-05

## 2018-09-04 RX ORDER — HYDRALAZINE HYDROCHLORIDE 20 MG/ML
5 INJECTION INTRAMUSCULAR; INTRAVENOUS
Status: DISCONTINUED | OUTPATIENT
Start: 2018-09-04 | End: 2018-09-08 | Stop reason: HOSPADM

## 2018-09-04 RX ORDER — OXYTOCIN/0.9 % SODIUM CHLORIDE 30/500 ML
100-340 PLASTIC BAG, INJECTION (ML) INTRAVENOUS CONTINUOUS PRN
Status: DISCONTINUED | OUTPATIENT
Start: 2018-09-04 | End: 2018-09-05

## 2018-09-04 RX ORDER — SODIUM CHLORIDE, SODIUM LACTATE, POTASSIUM CHLORIDE, CALCIUM CHLORIDE 600; 310; 30; 20 MG/100ML; MG/100ML; MG/100ML; MG/100ML
INJECTION, SOLUTION INTRAVENOUS CONTINUOUS
Status: DISCONTINUED | OUTPATIENT
Start: 2018-09-04 | End: 2018-09-05

## 2018-09-04 RX ORDER — OXYTOCIN 10 [USP'U]/ML
10 INJECTION, SOLUTION INTRAMUSCULAR; INTRAVENOUS
Status: DISCONTINUED | OUTPATIENT
Start: 2018-09-04 | End: 2018-09-05

## 2018-09-04 RX ORDER — MAGNESIUM SULFATE HEPTAHYDRATE 40 MG/ML
4 INJECTION, SOLUTION INTRAVENOUS
Status: DISCONTINUED | OUTPATIENT
Start: 2018-09-04 | End: 2018-09-05

## 2018-09-04 RX ORDER — HYDRALAZINE HYDROCHLORIDE 20 MG/ML
INJECTION INTRAMUSCULAR; INTRAVENOUS
Status: COMPLETED
Start: 2018-09-04 | End: 2018-09-04

## 2018-09-04 RX ORDER — IBUPROFEN 400 MG/1
800 TABLET, FILM COATED ORAL
Status: DISCONTINUED | OUTPATIENT
Start: 2018-09-04 | End: 2018-09-05

## 2018-09-04 RX ORDER — ONDANSETRON 2 MG/ML
4 INJECTION INTRAMUSCULAR; INTRAVENOUS EVERY 6 HOURS PRN
Status: DISCONTINUED | OUTPATIENT
Start: 2018-09-04 | End: 2018-09-05

## 2018-09-04 RX ORDER — HYDRALAZINE HYDROCHLORIDE 20 MG/ML
10 INJECTION INTRAMUSCULAR; INTRAVENOUS
Status: DISCONTINUED | OUTPATIENT
Start: 2018-09-04 | End: 2018-09-08 | Stop reason: HOSPADM

## 2018-09-04 RX ORDER — NIFEDIPINE 30 MG/1
60 TABLET, EXTENDED RELEASE ORAL
Status: DISCONTINUED | OUTPATIENT
Start: 2018-09-05 | End: 2018-09-07

## 2018-09-04 RX ORDER — METHYLDOPA 500 MG/1
500 TABLET, FILM COATED ORAL 3 TIMES DAILY
Qty: 90 TABLET | Refills: 1 | Status: ON HOLD | OUTPATIENT
Start: 2018-09-04 | End: 2018-09-08

## 2018-09-04 RX ORDER — NIFEDIPINE 30 MG/1
30 TABLET, EXTENDED RELEASE ORAL
Status: DISCONTINUED | OUTPATIENT
Start: 2018-09-04 | End: 2018-09-06

## 2018-09-04 RX ORDER — MAGNESIUM SULFATE HEPTAHYDRATE 40 MG/ML
4 INJECTION, SOLUTION INTRAVENOUS ONCE
Status: COMPLETED | OUTPATIENT
Start: 2018-09-04 | End: 2018-09-04

## 2018-09-04 RX ORDER — CALCIUM GLUCONATE 94 MG/ML
1 INJECTION, SOLUTION INTRAVENOUS
Status: DISCONTINUED | OUTPATIENT
Start: 2018-09-04 | End: 2018-09-08 | Stop reason: HOSPADM

## 2018-09-04 RX ADMIN — MAGNESIUM SULFATE IN WATER 2 G/HR: 40 INJECTION, SOLUTION INTRAVENOUS at 19:19

## 2018-09-04 RX ADMIN — METHYLDOPA 500 MG: 250 TABLET ORAL at 20:26

## 2018-09-04 RX ADMIN — DINOPROSTONE 10 MG: 10 INSERT VAGINAL at 20:14

## 2018-09-04 RX ADMIN — HYDRALAZINE HYDROCHLORIDE 10 MG: 20 INJECTION INTRAMUSCULAR; INTRAVENOUS at 18:46

## 2018-09-04 RX ADMIN — SODIUM CHLORIDE, POTASSIUM CHLORIDE, SODIUM LACTATE AND CALCIUM CHLORIDE: 600; 310; 30; 20 INJECTION, SOLUTION INTRAVENOUS at 18:23

## 2018-09-04 RX ADMIN — HYDRALAZINE HYDROCHLORIDE 5 MG: 20 INJECTION INTRAMUSCULAR; INTRAVENOUS at 18:19

## 2018-09-04 RX ADMIN — ACETAMINOPHEN 650 MG: 325 TABLET, FILM COATED ORAL at 20:24

## 2018-09-04 RX ADMIN — NIFEDIPINE 30 MG: 30 TABLET, FILM COATED, EXTENDED RELEASE ORAL at 20:25

## 2018-09-04 RX ADMIN — ONDANSETRON 4 MG: 2 INJECTION INTRAMUSCULAR; INTRAVENOUS at 23:25

## 2018-09-04 RX ADMIN — MAGNESIUM SULFATE IN WATER 4 G: 40 INJECTION, SOLUTION INTRAVENOUS at 18:48

## 2018-09-04 RX ADMIN — LABETALOL HCL 600 MG: 200 TABLET, FILM COATED ORAL at 20:24

## 2018-09-04 NOTE — IP AVS SNAPSHOT
MRN:[id number]                      After Visit Summary   9/4/2018    Kelly Wright    MRN: [id number]           Thank you!     Thank you for choosing [facility] for your care. Our goal is always to provide you with excellent care. Hearing back from our patients is one way we can continue to improve our services. Please take a few minutes to complete the written survey that you may receive in the mail after you visit with us. Thank you!        Patient Information     Date Of Birth          1980        Designated Caregiver       Most Recent Value    Caregiver    Will someone help with your care after discharge? yes    Name of designated caregiver Janet    Phone number of caregiver [phone]    Caregiver address [address], [place] [address]      About your hospital stay     You were admitted on:  September 4, 2018 You last received care in the:  [address]    You were discharged on:  September 8, 2018       Who to Call     For medical emergencies, please call 911.  For non-urgent questions about your medical care, please call your primary care provider or clinic, [phone]  For questions related to your surgery, please call your surgery clinic        Attending Provider     Provider Specialty    Vernell Mathur MD Obstetrics    Mayda Dior MD OB/Gyn       Primary Care Provider Office Phone # Fax #    Mayda Dior [phone]      After Care Instructions     Activity       Your activity upon discharge: activity as tolerated.  No heavy lifting or exercise for 6wks                  Follow-up Appointments     Follow-up and recommended labs and tests        RTC this week with Dr. Dior                  Your next 10 appointments already scheduled     Oct 23, 2018  3:00 PM CDT   Ech Complete with [id number]   [facility] [place] [facility]ch[facility] ([facility])    [address] [facility]  [address] [address]   [phone]           1.  Please bring or wear a comfortable two-piece outfit. 2.  You may eat, drink and take your normal medicines. 3.  For any questions that cannot be answered, please contact the ordering physician 4.  Please do not wear perfumes or scented lotions on the day of your exam.            Oct 23, 2018  4:00 PM CDT   LAB with [facility]   [facility] ([facility])    [address] [place] [address]   [phone]           Please do not eat 10-12 hours before your appointment if you are coming in fasting for labs on lipids, cholesterol, or glucose (sugar). This does not apply to pregnant women. Water, hot tea and black coffee (with nothing added) are okay. Do not drink other fluids, diet soda or chew gum.            Oct 26, 2018  3:45 PM CDT   Return Visit with Tom Knight MD   [facility] ([facility])    [address] [place], [place] [address]  [facility] [address]   [phone] OPT 2              Further instructions from your care team       Postop  Birth Instructions    Activity       Do not lift more than 10 pounds for 6 weeks after surgery.  Ask family and friends for help when you need it.    No driving until you have stopped taking your pain medications (usually two weeks after surgery).    No heavy exercise or activity for 6 weeks.  Don't do anything that will put a strain on your surgery site.    Don't strain when using the toilet.  Your care team may prescribe a stool softener if you have problems with your bowel movements.     To care for your incision:       Keep the incision clean and dry.    Do not soak your incision in water. No swimming or hot tubs until it has fully healed. You may soak in the bathtub if the water level is below your incision.    Do not use peroxide, gel, cream, lotion, or ointment on your incision.    Adjust  your clothes to avoid pressure on your surgery site (check the elastic in your underwear for example).     You may see a small amount of clear or pink drainage and this is normal.  Check with your health care provider:       If the drainage increases or has an odor.    If the incision reddens, you have swelling, or develop a rash.    If you have increased pain and the medicine we prescribed doesn't help.    If you have a fever above 100.4 F (38 C) with or without chills when placing thermometer under your tongue.   The area around your incision (surgery wound), will feel numb.  This is normal. The numbness should go away in less than a year.     Keep your hands clean:  Always wash your hands before touching your incision (surgery wound). This helps reduce your risk of infection. If your hands aren't dirty, you may use an alcohol hand-rub to clean your hands. Keep your nails clean and short.    Call your healthcare provider if you have any of these symptoms:       You soak a sanitary pad with blood within 1 hour, or you see blood clots larger than a golf ball.    Bleeding that lasts more than 6 weeks.    Vaginal discharge that smells bad.    Severe pain, cramping or tenderness in your lower belly area.    A need to urinate more frequently (use the toilet more often), more urgently (use the toilet very quickly), or it burns when you urinate.    Nausea and vomiting.    Redness, swelling or pain around a vein in your leg.    Problems breastfeeding or a red or painful area on your breast.    Chest pain and cough or are gasping for air.    Problems with coping with sadness, anxiety or depression. If you have concerns about hurting yourself or the baby, call your provider immediately.      You have questions or concerns after you return home.        Preeclampsia   Call your doctor right away if you have any of the following:  - Edema (swelling) in your face or hands  - Rapid weight gain-about 1 pound or more in a  "day  - Headache  - Abdominal pain on your right side  - Vision problems (flashes or spots)  - You have questions or concerns once you return home.          Pending Results     No orders found from 9/2/2018 to 9/5/2018.            Statement of Approval     Ordered          09/08/18 1312  I have reviewed and agree with all the recommendations and orders detailed in this document.  EFFECTIVE NOW     Approved and electronically signed by:  Hailey Seay MD             Admission Information     Date & Time Provider Department Dept. Phone    9/4/2018 Mayda Dior MD [address] [phone]      Your Vitals Were     Blood Pressure Pulse Temperature Respirations Height Weight    151/97 102 98.1  F (36.7  C) (Oral) 16 1.626 m (5' 4\") 79.1 kg (174 lb 6.4 oz)    Last Period Pulse Oximetry BMI (Body Mass Index)             11/19/2017 99% 29.94 kg/m2         [facility] Information     [employer] gives you secure access to your electronic health record. If you see a primary care provider, you can also send messages to your care team and make appointments. If you have questions, please call your primary care clinic.  If you do not have a primary care provider, please call [phone] and they will assist you.        Care EveryWhere ID     This is your Care EveryWhere ID. This could be used by other organizations to access your [place] medical records  [id number]        Equal Access to Services     KENDY COELHO : Hadii adrienne Schmidt, waaxda sasha, qaybta carolinaalrex miller. So [facility] [phone].    ATENCIÓN: Si habla español, tiene a orozco disposición servicios gratuitos de asistencia lingüística. Llame al [phone].    We comply with applicable federal civil rights laws and [place] laws. We do not discriminate on the basis of race, color, national origin, age, disability, sex, sexual orientation, or gender identity.               Review of " your medicines      START taking        Dose / Directions    oxyCODONE IR 5 MG tablet   Commonly known as:  ROXICODONE        Dose:  5-10 mg   Take 1-2 tablets (5-10 mg) by mouth every 3 hours as needed for other (pain control or improvement in physical function. Hold dose for analgesic side effects.)   Quantity:  20 tablet   Refills:  0         CONTINUE these medicines which may have CHANGED, or have new prescriptions. If we are uncertain of the size of tablets/capsules you have at home, strength may be listed as something that might have changed.        Dose / Directions    * LABETALOL HCL PO   This may have changed:  Another medication with the same name was added. Make sure you understand how and when to take each.        Dose:  600 mg   Take 600 mg by mouth 2 times daily   Refills:  0       * labetalol 200 MG tablet   Commonly known as:  NORMODYNE   This may have changed:  You were already taking a medication with the same name, and this prescription was added. Make sure you understand how and when to take each.   Used for:  Pregnancy induced hypertension, antepartum        Dose:  200 mg   Take 1 tablet (200 mg) by mouth every 12 hours   Refills:  0       * Notice:  This list has 2 medication(s) that are the same as other medications prescribed for you. Read the directions carefully, and ask your doctor or other care provider to review them with you.      CONTINUE these medicines which have NOT CHANGED        Dose / Directions    albuterol 108 (90 Base) MCG/ACT inhaler   Commonly known as:  PROAIR HFA   Used for:  Mild intermittent asthma without complication        Dose:  2 puff   Inhale 2 puffs into the lungs every 6 hours as needed for shortness of breath / dyspnea Due for office visit: [phone]   Quantity:  1 Inhaler   Refills:  1       ASTHMA CONTROLLER MEDICATION NOT PRESCRIBED (INTENTIONAL)   Used for:  Intermittent asthma        Asthma controller medication not prescribed intentionally due to  Other:not needed   Quantity:  0 each   Refills:  0       fluticasone 50 MCG/ACT spray   Commonly known as:  FLONASE   Used for:  Seasonal allergies        Dose:  1-2 spray   Spray 1-2 sprays into both nostrils daily   Quantity:  3 Package   Refills:  3       NIFEdipine ER 90 MG Tb24   Commonly known as:  ADALAT CC   Used for:  Pregnancy-induced hypertension in second trimester        Dose:  90 mg   Take 1 tablet (90 mg) by mouth daily Take 60 mg in the morning and 30 mg in the evening.   Quantity:  100 tablet   Refills:  3       prenatal multivitamin plus iron 27-0.8 MG Tabs per tablet        Dose:  2 tablet   Take 2 tablets by mouth 2 times daily   Refills:  0       PROBIOTIC PO        Refills:  0       TYLENOL PO        Take by mouth as needed for mild pain or fever   Refills:  0       ZYRTEC-D PO        Take by mouth daily   Refills:  0         STOP taking     ASPIR-81 PO           methyldopa 500 MG tablet   Commonly known as:  ALDOMET                Where to get your medicines      Some of these will need a paper prescription and others can be bought over the counter. Ask your nurse if you have questions.     Bring a paper prescription for each of these medications     oxyCODONE IR 5 MG tablet       You don't need a prescription for these medications     labetalol 200 MG tablet                Protect others around you: Learn how to safely use, store and throw away your medicines at www.disposemymeds.org.        Information about OPIOIDS     PRESCRIPTION OPIOIDS: WHAT YOU NEED TO KNOW   We gave you an opioid (narcotic) pain medicine. It is important to manage your pain, but opioids are not always the best choice. You should first try all the other options your care team gave you. Take this medicine for as short a time (and as few doses) as possible.    Some activities can increase your pain, such as bandage changes or therapy sessions. It may help to take your pain medicine 30 to 60 minutes before these  activities. Reduce your stress by getting enough sleep, working on hobbies you enjoy and practicing relaxation or meditation. Talk to your care team about ways to manage your pain beyond prescription opioids.    These medicines have risks:    DO NOT drive when on new or higher doses of pain medicine. These medicines can affect your alertness and reaction times, and you could be arrested for driving under the influence (DUI). If you need to use opioids long-term, talk to your care team about driving.    DO NOT operate heavy machinery    DO NOT do any other dangerous activities while taking these medicines.    DO NOT drink any alcohol while taking these medicines.     If the opioid prescribed includes acetaminophen, DO NOT take with any other medicines that contain acetaminophen. Read all labels carefully. Look for the word  acetaminophen  or  Tylenol.  Ask your pharmacist if you have questions or are unsure.    You can get addicted to pain medicines, especially if you have a history of addiction (chemical, alcohol or substance dependence). Talk to your care team about ways to reduce this risk.    All opioids tend to cause constipation. Drink plenty of water and eat foods that have a lot of fiber, such as fruits, vegetables, prune juice, apple juice and high-fiber cereal. Take a laxative (Miralax, milk of magnesia, Colace, Senna) if you don t move your bowels at least every other day. Other side effects include upset stomach, sleepiness, dizziness, throwing up, tolerance (needing more of the medicine to have the same effect), physical dependence and slowed breathing.    Store your pills in a secure place, locked if possible. We will not replace any lost or stolen medicine. If you don t finish your medicine, please throw away (dispose) as directed by your pharmacist. The [place] Pollution Control [employer] has more information about safe disposal: https://www.pca.[facility].[place].us/living-green/managing-unwanted-medications              Medication List: This is a list of all your medications and when to take them. Check marks below indicate your daily home schedule. Keep this list as a reference.      Medications           Morning Afternoon Evening Bedtime As Needed    albuterol 108 (90 Base) MCG/ACT inhaler   Commonly known as:  PROAIR HFA   Inhale 2 puffs into the lungs every 6 hours as needed for shortness of breath / dyspnea Due for office visit: [phone]                                ASTHMA CONTROLLER MEDICATION NOT PRESCRIBED (INTENTIONAL)   Asthma controller medication not prescribed intentionally due to Other:not needed                                fluticasone 50 MCG/ACT spray   Commonly known as:  FLONASE   Spray 1-2 sprays into both nostrils daily                                * LABETALOL HCL PO   Take 600 mg by mouth 2 times daily   Last time this was given:  200 mg on 9/8/2018  4:17 AM                                * labetalol 200 MG tablet   Commonly known as:  NORMODYNE   Take 1 tablet (200 mg) by mouth every 12 hours   Last time this was given:  200 mg on 9/8/2018  4:17 AM                                NIFEdipine ER 90 MG Tb24   Commonly known as:  ADALAT CC   Take 1 tablet (90 mg) by mouth daily Take 60 mg in the morning and 30 mg in the evening.   Last time this was given:  90 mg on 9/8/2018  8:12 AM                                oxyCODONE IR 5 MG tablet   Commonly known as:  ROXICODONE   Take 1-2 tablets (5-10 mg) by mouth every 3 hours as needed for other (pain control or improvement in physical function. Hold dose for analgesic side effects.)   Last time this was given:  5 mg on 9/7/2018 10:22 PM                                prenatal multivitamin plus iron 27-0.8 MG Tabs per tablet   Take 2 tablets by mouth 2 times daily                                PROBIOTIC PO                                TYLENOL PO   Take by mouth as needed for mild pain or fever   Last time this was given:  975 mg on 9/8/2018  8:13  AM                                ZYRTEC-D PO   Take by mouth daily                                * Notice:  This list has 2 medication(s) that are the same as other medications prescribed for you. Read the directions carefully, and ask your doctor or other care provider to review them with you.

## 2018-09-04 NOTE — IP AVS SNAPSHOT
[address]    [place] [address]    Phone:  [phone]                                       After Visit Summary   9/4/2018    Kelly Wright    MRN: [id number]           After Visit Summary Signature Page     I have received my discharge instructions, and my questions have been answered. I have discussed any challenges I see with this plan with the nurse or doctor.    ..........................................................................................................................................  Patient/Patient Representative Signature      ..........................................................................................................................................  Patient Representative Print Name and Relationship to Patient    ..................................................               ................................................  Date                                            Time    ..........................................................................................................................................  Reviewed by Signature/Title    ...................................................              ..............................................  Date                                                            Time          22EPIC Rev 08/18

## 2018-09-04 NOTE — PLAN OF CARE
Kelly is a 37yo  36w5d presents from home after calling clinic triage nurse to report frontal HA since yesterday, worsening today, not relieved by tylenol and hypertension at home. Hx of AMA, IVF pregnancy, IUGR, hypertension (on 3 BP meds, see admission), myomectomy (not preventing vaginal delivery), fibroids. Pt was scheduled for cervical ripening induction tomorrow evening. BP on admission 180/97. Still has HA, denies vision changes or RUQ pain.   -Dr Marcin Mensah paged due to severe pressures. 2nd RN doing intake/assessment.  -Discussed pt's bp, ROBERT, and hx with Dr Marcin Mensah. Plan to admit, do pre-eclampsia labs (see orders), mag sulfate (4gm load and 2gm/hr), hydralazine to treat severe BPs, hold PO hypertension meds, cervical ripening with cervidil. Pt can eat regular diet and have epidural when needed.   -Report given to Anisha CALDERON RN to assume cares.   -IV started. Hydralazine given per protocol. Verified with Anisha CALDERON RN.   1825-Pt transferred to L/D room 218 via wheelchair.

## 2018-09-04 NOTE — TELEPHONE ENCOUNTER
Pt calling for refill of methyldopa  Rx eprescribed to [facility] per pt request.  Only 30 days worth since pt will have baby in the next few weeks and may not need to continue the amount of antihypertensives.

## 2018-09-05 ENCOUNTER — ANESTHESIA (OUTPATIENT)
Dept: OBGYN | Facility: CLINIC | Age: 38
End: 2018-09-05
Payer: COMMERCIAL

## 2018-09-05 ENCOUNTER — ANESTHESIA EVENT (OUTPATIENT)
Dept: OBGYN | Facility: CLINIC | Age: 38
End: 2018-09-05
Payer: COMMERCIAL

## 2018-09-05 PROBLEM — Z98.891 S/P CESAREAN SECTION: Status: ACTIVE | Noted: 2018-09-05

## 2018-09-05 LAB
ALBUMIN SERPL-MCNC: 2.8 G/DL (ref 3.4–5)
ALP SERPL-CCNC: 61 U/L (ref 40–150)
ALT SERPL W P-5'-P-CCNC: 15 U/L (ref 0–50)
ALT SERPL W P-5'-P-CCNC: 21 U/L (ref 0–50)
ANION GAP SERPL CALCULATED.3IONS-SCNC: 11 MMOL/L (ref 3–14)
APTT PPP: 31 SEC (ref 22–37)
AST SERPL W P-5'-P-CCNC: 13 U/L (ref 0–45)
AST SERPL W P-5'-P-CCNC: 18 U/L (ref 0–45)
BASOPHILS # BLD AUTO: 0 10E9/L (ref 0–0.2)
BASOPHILS NFR BLD AUTO: 0.1 %
BILIRUB SERPL-MCNC: 0.4 MG/DL (ref 0.2–1.3)
BUN SERPL-MCNC: 8 MG/DL (ref 7–30)
CALCIUM SERPL-MCNC: 6.5 MG/DL (ref 8.5–10.1)
CHLORIDE SERPL-SCNC: 106 MMOL/L (ref 94–109)
CO2 SERPL-SCNC: 22 MMOL/L (ref 20–32)
CREAT SERPL-MCNC: 0.76 MG/DL (ref 0.52–1.04)
CREAT SERPL-MCNC: 0.77 MG/DL (ref 0.52–1.04)
DIFFERENTIAL METHOD BLD: ABNORMAL
EOSINOPHIL # BLD AUTO: 0 10E9/L (ref 0–0.7)
EOSINOPHIL NFR BLD AUTO: 0.1 %
EOSINOPHIL NFR BLD AUTO: 0.2 %
EOSINOPHIL NFR BLD AUTO: 0.3 %
ERYTHROCYTE [DISTWIDTH] IN BLOOD BY AUTOMATED COUNT: 13.2 % (ref 10–15)
ERYTHROCYTE [DISTWIDTH] IN BLOOD BY AUTOMATED COUNT: 13.4 % (ref 10–15)
ERYTHROCYTE [DISTWIDTH] IN BLOOD BY AUTOMATED COUNT: 13.4 % (ref 10–15)
FIBRINOGEN PPP-MCNC: 376 MG/DL (ref 200–420)
GFR SERPL CREATININE-BSD FRML MDRD: 84 ML/MIN/1.7M2
GFR SERPL CREATININE-BSD FRML MDRD: 86 ML/MIN/1.7M2
GLUCOSE SERPL-MCNC: 106 MG/DL (ref 70–99)
HCT VFR BLD AUTO: 28.6 % (ref 35–47)
HCT VFR BLD AUTO: 37.9 % (ref 35–47)
HCT VFR BLD AUTO: 40.3 % (ref 35–47)
HGB BLD-MCNC: 13.3 G/DL (ref 11.7–15.7)
HGB BLD-MCNC: 13.9 G/DL (ref 11.7–15.7)
HGB BLD-MCNC: 9.9 G/DL (ref 11.7–15.7)
IMM GRANULOCYTES # BLD: 0 10E9/L (ref 0–0.4)
IMM GRANULOCYTES # BLD: 0 10E9/L (ref 0–0.4)
IMM GRANULOCYTES # BLD: 0.1 10E9/L (ref 0–0.4)
IMM GRANULOCYTES NFR BLD: 0.3 %
IMM GRANULOCYTES NFR BLD: 0.4 %
IMM GRANULOCYTES NFR BLD: 0.4 %
INR PPP: 1.05 (ref 0.86–1.14)
LYMPHOCYTES # BLD AUTO: 1.1 10E9/L (ref 0.8–5.3)
LYMPHOCYTES # BLD AUTO: 1.3 10E9/L (ref 0.8–5.3)
LYMPHOCYTES # BLD AUTO: 1.3 10E9/L (ref 0.8–5.3)
LYMPHOCYTES NFR BLD AUTO: 12.1 %
LYMPHOCYTES NFR BLD AUTO: 12.1 %
LYMPHOCYTES NFR BLD AUTO: 9.4 %
MAGNESIUM SERPL-MCNC: 5 MG/DL (ref 1.6–2.3)
MAGNESIUM SERPL-MCNC: 6.5 MG/DL (ref 1.6–2.3)
MCH RBC QN AUTO: 29 PG (ref 26.5–33)
MCH RBC QN AUTO: 29.2 PG (ref 26.5–33)
MCH RBC QN AUTO: 29.6 PG (ref 26.5–33)
MCHC RBC AUTO-ENTMCNC: 34.5 G/DL (ref 31.5–36.5)
MCHC RBC AUTO-ENTMCNC: 34.6 G/DL (ref 31.5–36.5)
MCHC RBC AUTO-ENTMCNC: 35.1 G/DL (ref 31.5–36.5)
MCV RBC AUTO: 84 FL (ref 78–100)
MONOCYTES # BLD AUTO: 0.5 10E9/L (ref 0–1.3)
MONOCYTES # BLD AUTO: 0.5 10E9/L (ref 0–1.3)
MONOCYTES # BLD AUTO: 0.7 10E9/L (ref 0–1.3)
MONOCYTES NFR BLD AUTO: 4.3 %
MONOCYTES NFR BLD AUTO: 4.5 %
MONOCYTES NFR BLD AUTO: 6.4 %
NEUTROPHILS # BLD AUTO: 8.9 10E9/L (ref 1.6–8.3)
NEUTROPHILS # BLD AUTO: 9.1 10E9/L (ref 1.6–8.3)
NEUTROPHILS # BLD AUTO: 9.4 10E9/L (ref 1.6–8.3)
NEUTROPHILS NFR BLD AUTO: 82.6 %
NEUTROPHILS NFR BLD AUTO: 83.1 %
NEUTROPHILS NFR BLD AUTO: 83.5 %
NRBC # BLD AUTO: 0 10*3/UL
NRBC BLD AUTO-RTO: 0 /100
PLATELET # BLD AUTO: 173 10E9/L (ref 150–450)
PLATELET # BLD AUTO: 198 10E9/L (ref 150–450)
PLATELET # BLD AUTO: 221 10E9/L (ref 150–450)
POTASSIUM SERPL-SCNC: 3.2 MMOL/L (ref 3.4–5.3)
PROT SERPL-MCNC: 5.2 G/DL (ref 6.8–8.8)
RBC # BLD AUTO: 3.39 10E12/L (ref 3.8–5.2)
RBC # BLD AUTO: 4.5 10E12/L (ref 3.8–5.2)
RBC # BLD AUTO: 4.79 10E12/L (ref 3.8–5.2)
SODIUM SERPL-SCNC: 139 MMOL/L (ref 133–144)
T PALLIDUM AB SER QL: NONREACTIVE
WBC # BLD AUTO: 10.7 10E9/L (ref 4–11)
WBC # BLD AUTO: 11 10E9/L (ref 4–11)
WBC # BLD AUTO: 11.3 10E9/L (ref 4–11)

## 2018-09-05 PROCEDURE — 85025 COMPLETE CBC W/AUTO DIFF WBC: CPT | Performed by: OBSTETRICS & GYNECOLOGY

## 2018-09-05 PROCEDURE — 84132 ASSAY OF SERUM POTASSIUM: CPT

## 2018-09-05 PROCEDURE — 25000125 ZZHC RX 250: Performed by: OBSTETRICS & GYNECOLOGY

## 2018-09-05 PROCEDURE — 37000009 ZZH ANESTHESIA TECHNICAL FEE, EACH ADDTL 15 MIN: Performed by: OBSTETRICS & GYNECOLOGY

## 2018-09-05 PROCEDURE — 36000058 ZZH SURGERY LEVEL 3 EA 15 ADDTL MIN: Performed by: OBSTETRICS & GYNECOLOGY

## 2018-09-05 PROCEDURE — 25000128 H RX IP 250 OP 636: Performed by: NURSE ANESTHETIST, CERTIFIED REGISTERED

## 2018-09-05 PROCEDURE — 82803 BLOOD GASES ANY COMBINATION: CPT

## 2018-09-05 PROCEDURE — 83735 ASSAY OF MAGNESIUM: CPT | Performed by: OBSTETRICS & GYNECOLOGY

## 2018-09-05 PROCEDURE — 85610 PROTHROMBIN TIME: CPT | Performed by: OBSTETRICS & GYNECOLOGY

## 2018-09-05 PROCEDURE — 25000128 H RX IP 250 OP 636: Performed by: ANESTHESIOLOGY

## 2018-09-05 PROCEDURE — 3E033VJ INTRODUCTION OF OTHER HORMONE INTO PERIPHERAL VEIN, PERCUTANEOUS APPROACH: ICD-10-PCS | Performed by: OBSTETRICS & GYNECOLOGY

## 2018-09-05 PROCEDURE — 36000056 ZZH SURGERY LEVEL 3 1ST 30 MIN: Performed by: OBSTETRICS & GYNECOLOGY

## 2018-09-05 PROCEDURE — 25000132 ZZH RX MED GY IP 250 OP 250 PS 637: Performed by: OBSTETRICS & GYNECOLOGY

## 2018-09-05 PROCEDURE — 85025 COMPLETE CBC W/AUTO DIFF WBC: CPT | Performed by: ANESTHESIOLOGY

## 2018-09-05 PROCEDURE — 85384 FIBRINOGEN ACTIVITY: CPT | Performed by: OBSTETRICS & GYNECOLOGY

## 2018-09-05 PROCEDURE — P9041 ALBUMIN (HUMAN),5%, 50ML: HCPCS | Performed by: NURSE ANESTHETIST, CERTIFIED REGISTERED

## 2018-09-05 PROCEDURE — 88307 TISSUE EXAM BY PATHOLOGIST: CPT | Mod: 26 | Performed by: OBSTETRICS & GYNECOLOGY

## 2018-09-05 PROCEDURE — 25000125 ZZHC RX 250: Performed by: NURSE ANESTHETIST, CERTIFIED REGISTERED

## 2018-09-05 PROCEDURE — 80053 COMPREHEN METABOLIC PANEL: CPT | Performed by: OBSTETRICS & GYNECOLOGY

## 2018-09-05 PROCEDURE — 25000128 H RX IP 250 OP 636

## 2018-09-05 PROCEDURE — 85730 THROMBOPLASTIN TIME PARTIAL: CPT | Performed by: OBSTETRICS & GYNECOLOGY

## 2018-09-05 PROCEDURE — 27210794 ZZH OR GENERAL SUPPLY STERILE: Performed by: OBSTETRICS & GYNECOLOGY

## 2018-09-05 PROCEDURE — 25000128 H RX IP 250 OP 636: Performed by: OBSTETRICS & GYNECOLOGY

## 2018-09-05 PROCEDURE — 71000012 ZZH RECOVERY PHASE 1 LEVEL 1 FIRST HR: Performed by: OBSTETRICS & GYNECOLOGY

## 2018-09-05 PROCEDURE — 85014 HEMATOCRIT: CPT

## 2018-09-05 PROCEDURE — 36415 COLL VENOUS BLD VENIPUNCTURE: CPT | Performed by: ANESTHESIOLOGY

## 2018-09-05 PROCEDURE — 82565 ASSAY OF CREATININE: CPT | Performed by: OBSTETRICS & GYNECOLOGY

## 2018-09-05 PROCEDURE — 71000013 ZZH RECOVERY PHASE 1 LEVEL 1 EA ADDTL HR: Performed by: OBSTETRICS & GYNECOLOGY

## 2018-09-05 PROCEDURE — 88307 TISSUE EXAM BY PATHOLOGIST: CPT | Performed by: OBSTETRICS & GYNECOLOGY

## 2018-09-05 PROCEDURE — 12000037 ZZH R&B POSTPARTUM INTERMEDIATE

## 2018-09-05 PROCEDURE — 36415 COLL VENOUS BLD VENIPUNCTURE: CPT | Performed by: OBSTETRICS & GYNECOLOGY

## 2018-09-05 PROCEDURE — 84295 ASSAY OF SERUM SODIUM: CPT

## 2018-09-05 PROCEDURE — 37000008 ZZH ANESTHESIA TECHNICAL FEE, 1ST 30 MIN: Performed by: OBSTETRICS & GYNECOLOGY

## 2018-09-05 PROCEDURE — 84450 TRANSFERASE (AST) (SGOT): CPT | Performed by: OBSTETRICS & GYNECOLOGY

## 2018-09-05 PROCEDURE — 84460 ALANINE AMINO (ALT) (SGPT): CPT | Performed by: OBSTETRICS & GYNECOLOGY

## 2018-09-05 RX ORDER — OXYTOCIN 10 [USP'U]/ML
10 INJECTION, SOLUTION INTRAMUSCULAR; INTRAVENOUS
Status: DISCONTINUED | OUTPATIENT
Start: 2018-09-05 | End: 2018-09-08 | Stop reason: HOSPADM

## 2018-09-05 RX ORDER — ALBUMIN, HUMAN INJ 5% 5 %
SOLUTION INTRAVENOUS CONTINUOUS PRN
Status: DISCONTINUED | OUTPATIENT
Start: 2018-09-05 | End: 2018-09-05

## 2018-09-05 RX ORDER — NALOXONE HYDROCHLORIDE 0.4 MG/ML
.1-.4 INJECTION, SOLUTION INTRAMUSCULAR; INTRAVENOUS; SUBCUTANEOUS
Status: DISCONTINUED | OUTPATIENT
Start: 2018-09-05 | End: 2018-09-08 | Stop reason: HOSPADM

## 2018-09-05 RX ORDER — OXYTOCIN/0.9 % SODIUM CHLORIDE 30/500 ML
PLASTIC BAG, INJECTION (ML) INTRAVENOUS
Status: DISCONTINUED
Start: 2018-09-05 | End: 2018-09-05 | Stop reason: HOSPADM

## 2018-09-05 RX ORDER — METHYLERGONOVINE MALEATE 0.2 MG/ML
INJECTION INTRAVENOUS PRN
Status: DISCONTINUED | OUTPATIENT
Start: 2018-09-05 | End: 2018-09-05

## 2018-09-05 RX ORDER — IBUPROFEN 400 MG/1
800 TABLET, FILM COATED ORAL EVERY 6 HOURS PRN
Status: DISCONTINUED | OUTPATIENT
Start: 2018-09-05 | End: 2018-09-06

## 2018-09-05 RX ORDER — ONDANSETRON 4 MG/1
4 TABLET, ORALLY DISINTEGRATING ORAL EVERY 6 HOURS PRN
Status: DISCONTINUED | OUTPATIENT
Start: 2018-09-05 | End: 2018-09-08 | Stop reason: HOSPADM

## 2018-09-05 RX ORDER — OXYCODONE HYDROCHLORIDE 5 MG/1
5-10 TABLET ORAL
Status: DISCONTINUED | OUTPATIENT
Start: 2018-09-05 | End: 2018-09-08 | Stop reason: HOSPADM

## 2018-09-05 RX ORDER — TRANEXAMIC ACID 100 MG/ML
INJECTION, SOLUTION INTRAVENOUS
Status: DISCONTINUED
Start: 2018-09-05 | End: 2018-09-05 | Stop reason: HOSPADM

## 2018-09-05 RX ORDER — ONDANSETRON 2 MG/ML
4 INJECTION INTRAMUSCULAR; INTRAVENOUS EVERY 6 HOURS PRN
Status: DISCONTINUED | OUTPATIENT
Start: 2018-09-05 | End: 2018-09-08 | Stop reason: HOSPADM

## 2018-09-05 RX ORDER — SIMETHICONE 80 MG
80 TABLET,CHEWABLE ORAL 4 TIMES DAILY PRN
Status: DISCONTINUED | OUTPATIENT
Start: 2018-09-05 | End: 2018-09-08 | Stop reason: HOSPADM

## 2018-09-05 RX ORDER — MISOPROSTOL 200 UG/1
800 TABLET ORAL
Status: DISCONTINUED | OUTPATIENT
Start: 2018-09-05 | End: 2018-09-08 | Stop reason: HOSPADM

## 2018-09-05 RX ORDER — LIDOCAINE 40 MG/G
CREAM TOPICAL
Status: DISCONTINUED | OUTPATIENT
Start: 2018-09-05 | End: 2018-09-08 | Stop reason: HOSPADM

## 2018-09-05 RX ORDER — CITRIC ACID/SODIUM CITRATE 334-500MG
30 SOLUTION, ORAL ORAL
Status: COMPLETED | OUTPATIENT
Start: 2018-09-05 | End: 2018-09-05

## 2018-09-05 RX ORDER — OXYTOCIN/0.9 % SODIUM CHLORIDE 30/500 ML
100 PLASTIC BAG, INJECTION (ML) INTRAVENOUS CONTINUOUS
Status: DISCONTINUED | OUTPATIENT
Start: 2018-09-05 | End: 2018-09-08 | Stop reason: HOSPADM

## 2018-09-05 RX ORDER — ALBUTEROL SULFATE 90 UG/1
2 AEROSOL, METERED RESPIRATORY (INHALATION) EVERY 6 HOURS PRN
Status: DISCONTINUED | OUTPATIENT
Start: 2018-09-05 | End: 2018-09-08 | Stop reason: HOSPADM

## 2018-09-05 RX ORDER — EPHEDRINE SULFATE 50 MG/ML
5 INJECTION, SOLUTION INTRAMUSCULAR; INTRAVENOUS; SUBCUTANEOUS
Status: DISCONTINUED | OUTPATIENT
Start: 2018-09-05 | End: 2018-09-08 | Stop reason: HOSPADM

## 2018-09-05 RX ORDER — NALBUPHINE HYDROCHLORIDE 10 MG/ML
2.5-5 INJECTION, SOLUTION INTRAMUSCULAR; INTRAVENOUS; SUBCUTANEOUS EVERY 6 HOURS PRN
Status: DISCONTINUED | OUTPATIENT
Start: 2018-09-05 | End: 2018-09-08 | Stop reason: HOSPADM

## 2018-09-05 RX ORDER — FENTANYL CITRATE 50 UG/ML
100 INJECTION, SOLUTION INTRAMUSCULAR; INTRAVENOUS
Status: DISCONTINUED | OUTPATIENT
Start: 2018-09-05 | End: 2018-09-08 | Stop reason: HOSPADM

## 2018-09-05 RX ORDER — LIDOCAINE 40 MG/G
CREAM TOPICAL
Status: DISCONTINUED | OUTPATIENT
Start: 2018-09-05 | End: 2018-09-05

## 2018-09-05 RX ORDER — HYDROMORPHONE HYDROCHLORIDE 1 MG/ML
.3-.5 INJECTION, SOLUTION INTRAMUSCULAR; INTRAVENOUS; SUBCUTANEOUS EVERY 30 MIN PRN
Status: DISCONTINUED | OUTPATIENT
Start: 2018-09-05 | End: 2018-09-08 | Stop reason: HOSPADM

## 2018-09-05 RX ORDER — HYDROCORTISONE 2.5 %
CREAM (GRAM) TOPICAL 3 TIMES DAILY PRN
Status: DISCONTINUED | OUTPATIENT
Start: 2018-09-05 | End: 2018-09-08 | Stop reason: HOSPADM

## 2018-09-05 RX ORDER — DEXTROSE, SODIUM CHLORIDE, SODIUM LACTATE, POTASSIUM CHLORIDE, AND CALCIUM CHLORIDE 5; .6; .31; .03; .02 G/100ML; G/100ML; G/100ML; G/100ML; G/100ML
INJECTION, SOLUTION INTRAVENOUS CONTINUOUS
Status: DISCONTINUED | OUTPATIENT
Start: 2018-09-05 | End: 2018-09-08 | Stop reason: HOSPADM

## 2018-09-05 RX ORDER — OXYTOCIN/0.9 % SODIUM CHLORIDE 30/500 ML
340 PLASTIC BAG, INJECTION (ML) INTRAVENOUS CONTINUOUS PRN
Status: DISCONTINUED | OUTPATIENT
Start: 2018-09-05 | End: 2018-09-08 | Stop reason: HOSPADM

## 2018-09-05 RX ORDER — AMOXICILLIN 250 MG
2 CAPSULE ORAL 2 TIMES DAILY PRN
Status: DISCONTINUED | OUTPATIENT
Start: 2018-09-05 | End: 2018-09-08 | Stop reason: HOSPADM

## 2018-09-05 RX ORDER — LANOLIN 100 %
OINTMENT (GRAM) TOPICAL
Status: DISCONTINUED | OUTPATIENT
Start: 2018-09-05 | End: 2018-09-08 | Stop reason: HOSPADM

## 2018-09-05 RX ORDER — SODIUM CHLORIDE, SODIUM LACTATE, POTASSIUM CHLORIDE, CALCIUM CHLORIDE 600; 310; 30; 20 MG/100ML; MG/100ML; MG/100ML; MG/100ML
INJECTION, SOLUTION INTRAVENOUS CONTINUOUS
Status: DISCONTINUED | OUTPATIENT
Start: 2018-09-05 | End: 2018-09-05

## 2018-09-05 RX ORDER — MORPHINE SULFATE 1 MG/ML
INJECTION, SOLUTION EPIDURAL; INTRATHECAL; INTRAVENOUS
Status: DISCONTINUED
Start: 2018-09-05 | End: 2018-09-05 | Stop reason: HOSPADM

## 2018-09-05 RX ORDER — ALBUMIN, HUMAN INJ 5% 5 %
SOLUTION INTRAVENOUS
Status: DISCONTINUED
Start: 2018-09-05 | End: 2018-09-05 | Stop reason: HOSPADM

## 2018-09-05 RX ORDER — ONDANSETRON 2 MG/ML
4 INJECTION INTRAMUSCULAR; INTRAVENOUS EVERY 6 HOURS PRN
Status: DISCONTINUED | OUTPATIENT
Start: 2018-09-05 | End: 2018-09-05

## 2018-09-05 RX ORDER — ONDANSETRON 2 MG/ML
INJECTION INTRAMUSCULAR; INTRAVENOUS PRN
Status: DISCONTINUED | OUTPATIENT
Start: 2018-09-05 | End: 2018-09-05

## 2018-09-05 RX ORDER — BISACODYL 10 MG
10 SUPPOSITORY, RECTAL RECTAL DAILY PRN
Status: DISCONTINUED | OUTPATIENT
Start: 2018-09-07 | End: 2018-09-08 | Stop reason: HOSPADM

## 2018-09-05 RX ORDER — AMOXICILLIN 250 MG
1 CAPSULE ORAL 2 TIMES DAILY PRN
Status: DISCONTINUED | OUTPATIENT
Start: 2018-09-05 | End: 2018-09-08 | Stop reason: HOSPADM

## 2018-09-05 RX ORDER — LABETALOL 200 MG/1
600 TABLET, FILM COATED ORAL 2 TIMES DAILY
Status: DISCONTINUED | OUTPATIENT
Start: 2018-09-05 | End: 2018-09-06

## 2018-09-05 RX ORDER — ACETAMINOPHEN 325 MG/1
975 TABLET ORAL EVERY 8 HOURS
Status: DISCONTINUED | OUTPATIENT
Start: 2018-09-05 | End: 2018-09-08 | Stop reason: HOSPADM

## 2018-09-05 RX ORDER — BUPIVACAINE HYDROCHLORIDE 7.5 MG/ML
INJECTION, SOLUTION INTRASPINAL
Status: DISCONTINUED
Start: 2018-09-05 | End: 2018-09-05 | Stop reason: HOSPADM

## 2018-09-05 RX ORDER — METHYLDOPA 250 MG/1
500 TABLET, FILM COATED ORAL 3 TIMES DAILY
Status: DISCONTINUED | OUTPATIENT
Start: 2018-09-05 | End: 2018-09-06

## 2018-09-05 RX ORDER — FENTANYL CITRATE 50 UG/ML
INJECTION, SOLUTION INTRAMUSCULAR; INTRAVENOUS
Status: COMPLETED
Start: 2018-09-05 | End: 2018-09-05

## 2018-09-05 RX ORDER — ACETAMINOPHEN 325 MG/1
650 TABLET ORAL EVERY 4 HOURS PRN
Status: DISCONTINUED | OUTPATIENT
Start: 2018-09-08 | End: 2018-09-08 | Stop reason: HOSPADM

## 2018-09-05 RX ORDER — CLINDAMYCIN PHOSPHATE 900 MG/50ML
900 INJECTION, SOLUTION INTRAVENOUS
Status: COMPLETED | OUTPATIENT
Start: 2018-09-05 | End: 2018-09-05

## 2018-09-05 RX ORDER — OXYTOCIN/0.9 % SODIUM CHLORIDE 30/500 ML
1-24 PLASTIC BAG, INJECTION (ML) INTRAVENOUS CONTINUOUS
Status: DISCONTINUED | OUTPATIENT
Start: 2018-09-05 | End: 2018-09-05

## 2018-09-05 RX ORDER — BUPIVACAINE HYDROCHLORIDE 7.5 MG/ML
INJECTION, SOLUTION INTRASPINAL PRN
Status: DISCONTINUED | OUTPATIENT
Start: 2018-09-05 | End: 2018-09-05

## 2018-09-05 RX ORDER — GENTAMICIN SULFATE 100 MG/100ML
1.5 INJECTION, SOLUTION INTRAVENOUS
Status: COMPLETED | OUTPATIENT
Start: 2018-09-05 | End: 2018-09-05

## 2018-09-05 RX ORDER — NIFEDIPINE 90 MG/1
90 TABLET, EXTENDED RELEASE ORAL DAILY
Status: DISCONTINUED | OUTPATIENT
Start: 2018-09-06 | End: 2018-09-05

## 2018-09-05 RX ORDER — CARBOPROST TROMETHAMINE 250 UG/ML
250 INJECTION, SOLUTION INTRAMUSCULAR
Status: DISCONTINUED | OUTPATIENT
Start: 2018-09-05 | End: 2018-09-08 | Stop reason: HOSPADM

## 2018-09-05 RX ORDER — MORPHINE SULFATE 1 MG/ML
INJECTION, SOLUTION EPIDURAL; INTRATHECAL; INTRAVENOUS PRN
Status: DISCONTINUED | OUTPATIENT
Start: 2018-09-05 | End: 2018-09-05

## 2018-09-05 RX ADMIN — OXYTOCIN-SODIUM CHLORIDE 0.9% IV SOLN 30 UNIT/500ML 100 ML/HR: 30-0.9/5 SOLUTION at 21:35

## 2018-09-05 RX ADMIN — ALBUMIN (HUMAN): 12.5 SOLUTION INTRAVENOUS at 19:36

## 2018-09-05 RX ADMIN — SODIUM CITRATE AND CITRIC ACID MONOHYDRATE 30 ML: 500; 334 SOLUTION ORAL at 18:11

## 2018-09-05 RX ADMIN — TRANEXAMIC ACID 1 G: 100 INJECTION, SOLUTION INTRAVENOUS at 19:10

## 2018-09-05 RX ADMIN — MORPHINE SULFATE 0.15 MG: 1 INJECTION, SOLUTION EPIDURAL; INTRATHECAL; INTRAVENOUS at 18:38

## 2018-09-05 RX ADMIN — OXYTOCIN-SODIUM CHLORIDE 0.9% IV SOLN 30 UNIT/500ML 100 ML/HR: 30-0.9/5 SOLUTION at 20:01

## 2018-09-05 RX ADMIN — PHENYLEPHRINE HYDROCHLORIDE 100 MCG: 10 INJECTION, SOLUTION INTRAMUSCULAR; INTRAVENOUS; SUBCUTANEOUS at 19:18

## 2018-09-05 RX ADMIN — PHENYLEPHRINE HYDROCHLORIDE 0.5 MCG/KG/MIN: 10 INJECTION, SOLUTION INTRAMUSCULAR; INTRAVENOUS; SUBCUTANEOUS at 19:19

## 2018-09-05 RX ADMIN — BUPIVACAINE HYDROCHLORIDE IN DEXTROSE 12.5 MG: 7.5 INJECTION, SOLUTION SUBARACHNOID at 18:38

## 2018-09-05 RX ADMIN — OXYTOCIN-SODIUM CHLORIDE 0.9% IV SOLN 30 UNIT/500ML 2 MILLI-UNITS/MIN: 30-0.9/5 SOLUTION at 09:29

## 2018-09-05 RX ADMIN — PHENYLEPHRINE HYDROCHLORIDE 100 MCG: 10 INJECTION, SOLUTION INTRAMUSCULAR; INTRAVENOUS; SUBCUTANEOUS at 19:22

## 2018-09-05 RX ADMIN — CLINDAMYCIN PHOSPHATE 900 MG: 18 INJECTION, SOLUTION INTRAVENOUS at 18:40

## 2018-09-05 RX ADMIN — SODIUM CHLORIDE, POTASSIUM CHLORIDE, SODIUM LACTATE AND CALCIUM CHLORIDE: 600; 310; 30; 20 INJECTION, SOLUTION INTRAVENOUS at 18:30

## 2018-09-05 RX ADMIN — PHENYLEPHRINE HYDROCHLORIDE 200 MCG: 10 INJECTION, SOLUTION INTRAMUSCULAR; INTRAVENOUS; SUBCUTANEOUS at 19:03

## 2018-09-05 RX ADMIN — PHENYLEPHRINE HYDROCHLORIDE 100 MCG: 10 INJECTION, SOLUTION INTRAMUSCULAR; INTRAVENOUS; SUBCUTANEOUS at 19:12

## 2018-09-05 RX ADMIN — NIFEDIPINE 60 MG: 60 TABLET, FILM COATED, EXTENDED RELEASE ORAL at 07:55

## 2018-09-05 RX ADMIN — SODIUM CHLORIDE, POTASSIUM CHLORIDE, SODIUM LACTATE AND CALCIUM CHLORIDE: 600; 310; 30; 20 INJECTION, SOLUTION INTRAVENOUS at 19:48

## 2018-09-05 RX ADMIN — FENTANYL CITRATE 100 MCG: 50 INJECTION INTRAMUSCULAR; INTRAVENOUS at 14:21

## 2018-09-05 RX ADMIN — PHENYLEPHRINE HYDROCHLORIDE 100 MCG: 10 INJECTION, SOLUTION INTRAMUSCULAR; INTRAVENOUS; SUBCUTANEOUS at 19:05

## 2018-09-05 RX ADMIN — CARBOPROST TROMETHAMINE 250 MCG: 250 INJECTION, SOLUTION INTRAMUSCULAR at 19:10

## 2018-09-05 RX ADMIN — ALBUMIN (HUMAN): 12.5 SOLUTION INTRAVENOUS at 19:08

## 2018-09-05 RX ADMIN — ONDANSETRON 4 MG: 2 INJECTION INTRAMUSCULAR; INTRAVENOUS at 19:31

## 2018-09-05 RX ADMIN — ALBUMIN (HUMAN): 12.5 SOLUTION INTRAVENOUS at 19:24

## 2018-09-05 RX ADMIN — METHYLERGONOVINE MALEATE 200 MCG: 0.2 INJECTION INTRAMUSCULAR; INTRAVENOUS at 19:14

## 2018-09-05 RX ADMIN — FENTANYL CITRATE 100 MCG: 50 INJECTION INTRAMUSCULAR; INTRAVENOUS at 17:11

## 2018-09-05 RX ADMIN — PHENYLEPHRINE HYDROCHLORIDE 100 MCG: 10 INJECTION, SOLUTION INTRAMUSCULAR; INTRAVENOUS; SUBCUTANEOUS at 18:36

## 2018-09-05 RX ADMIN — PHENYLEPHRINE HYDROCHLORIDE 0.25 MCG/KG/MIN: 10 INJECTION, SOLUTION INTRAMUSCULAR; INTRAVENOUS; SUBCUTANEOUS at 20:01

## 2018-09-05 RX ADMIN — METHYLDOPA 500 MG: 250 TABLET ORAL at 08:06

## 2018-09-05 RX ADMIN — PHENYLEPHRINE HYDROCHLORIDE 100 MCG: 10 INJECTION, SOLUTION INTRAMUSCULAR; INTRAVENOUS; SUBCUTANEOUS at 18:54

## 2018-09-05 RX ADMIN — FENTANYL CITRATE 100 MCG: 50 INJECTION INTRAMUSCULAR; INTRAVENOUS at 15:44

## 2018-09-05 RX ADMIN — ONDANSETRON 4 MG: 2 INJECTION INTRAMUSCULAR; INTRAVENOUS at 18:34

## 2018-09-05 RX ADMIN — PHENYLEPHRINE HYDROCHLORIDE 100 MCG: 10 INJECTION, SOLUTION INTRAMUSCULAR; INTRAVENOUS; SUBCUTANEOUS at 19:10

## 2018-09-05 RX ADMIN — PHENYLEPHRINE HYDROCHLORIDE 100 MCG: 10 INJECTION, SOLUTION INTRAMUSCULAR; INTRAVENOUS; SUBCUTANEOUS at 19:15

## 2018-09-05 RX ADMIN — MAGNESIUM SULFATE IN WATER 2 G/HR: 40 INJECTION, SOLUTION INTRAVENOUS at 05:16

## 2018-09-05 RX ADMIN — METHYLDOPA 500 MG: 250 TABLET ORAL at 14:18

## 2018-09-05 RX ADMIN — FENTANYL CITRATE 100 MCG: 50 INJECTION INTRAMUSCULAR; INTRAVENOUS at 12:37

## 2018-09-05 RX ADMIN — PHENYLEPHRINE HYDROCHLORIDE 100 MCG: 10 INJECTION, SOLUTION INTRAMUSCULAR; INTRAVENOUS; SUBCUTANEOUS at 19:07

## 2018-09-05 RX ADMIN — SODIUM CHLORIDE, POTASSIUM CHLORIDE, SODIUM LACTATE AND CALCIUM CHLORIDE: 600; 310; 30; 20 INJECTION, SOLUTION INTRAVENOUS at 12:56

## 2018-09-05 RX ADMIN — LABETALOL HCL 600 MG: 200 TABLET, FILM COATED ORAL at 07:55

## 2018-09-05 RX ADMIN — GENTAMICIN SULFATE 100 MG: 100 INJECTION, SOLUTION INTRAVENOUS at 18:11

## 2018-09-05 ASSESSMENT — LIFESTYLE VARIABLES: TOBACCO_USE: 0

## 2018-09-05 ASSESSMENT — COPD QUESTIONNAIRES: COPD: 0

## 2018-09-05 NOTE — PLAN OF CARE
The Cervidil is removed at 0809. Dr. Nicole breaks the bag of water at 0810 with clear fluid.  The patient is 2 cm, 60%, (-) 2.  Pitocin is started at 0929.  A IUPC is inserted due to difficulty tracing UC's.

## 2018-09-05 NOTE — ANESTHESIA PROCEDURE NOTES
Peripheral nerve/Neuraxial procedure note : intrathecal  Pre-Procedure  Performed by ANDER VARGAS  Location: OR      Pre-Anesthestic Checklist: patient identified, IV checked, risks and benefits discussed, informed consent, monitors and equipment checked and pre-op evaluation    Timeout  Correct Patient: Yes   Correct Procedure: Yes   Correct Site: Yes   Correct Laterality: N/A   Correct Position: Yes   Site Marked: N/A   .   Procedure Documentation    .    Procedure:    Intrathecal.  Insertion Site:L4-5  (midline approach)      Patient Prep;mask, sterile gloves, povidone-iodine 7.5% surgical scrub, patient draped.  .  Needle: Rachel tip Spinal Needle (gauge): 25  Spinal/LP Needle Length (inches): 3.5 # of attempts: 1 and  # of redirects:  1 Introducer used .       Assessment/Narrative  Paresthesias: No.  .  .  clear CSF fluid removed . Comments:  No pain with injection, questions answered about procedure. Platelets above 190,000

## 2018-09-05 NOTE — PLAN OF CARE
Problem: Labor (Cervical Ripen, Induct, Augment) (Adult,Obstetrics,Pediatric)  Goal: Signs and Symptoms of Listed Potential Problems Will be Absent, Minimized or Managed (Labor)  Signs and symptoms of listed potential problems will be absent, minimized or managed by discharge/transition of care (reference Labor (Cervical Ripen, Induct, Augment) (Adult,Obstetrics,Pediatric) CPG).   The FHT's are bradycardic with the Magnesium infusion and the Fentanyl administration for the uterine contractions pain.  Oxygen is applied.

## 2018-09-05 NOTE — H&P
H&P Update    No significant change in general health status based on examination of the patient, review of Nursing Admission Database and prenatal record.    39yo  at 36w6d admitted for MIL. Pregnancy c/b likely chronic HTN with poor control, on 3 meds (Labetalol, Aldomet and Nifedipine), following with Cardiology. Also c/b IUGR with EFW 4-14 at 36wks (9%ile). Hx infertility, this pregnancy is the result of IVF FET. Left ovarian cyst seen on US at 20wks, plan to f/u postpartum. GBS neg and EFW 5-5.5lbs. Presented to L&D with unrelenting HA and elevated BPs c/w pre-eclampsia with severe features. HELLP labs nl. Started on Magnesium for seizure prophylaxis and Cervidil o/n for cervical ripening. This morning cvx 2/60/-2, AROM with clear fluid. FHTs category I. Will start pitocin induction, planning SAJAN. Anticipate .    Crista Nicole MD

## 2018-09-05 NOTE — PLAN OF CARE
Problem: Hypertensive Disorders in Pregnancy (Adult,Obstetrics,Pediatric)  Goal: Signs and Symptoms of Listed Potential Problems Will be Absent, Minimized or Managed (Hypertensive Disorders in Pregnancy)  Signs and symptoms of listed potential problems will be absent, minimized or managed by discharge/transition of care (reference Hypertensive Disorders in Pregnancy (Adult,Obstetrics,Pediatric) CPG).   Outcome: Therapy, progress toward functional goals as expected  Initial admission BP's elevated.  Pt received 2 doses IV Hydralazine with BP's returning to normal limits.  Pt restarted on her home BP meds also.  Pt cont on Magnesium sulfate gtt, tolerating it well.  Labs WNL, will be repeated in am.

## 2018-09-05 NOTE — PROGRESS NOTES
OB Progress Note - 1610    Pt more uncomfortable, has received Fentanyl x2 doses. IUPC placed due to difficulty tracing ctx and managing pitocin, now at 14mU/min pitocin with inadequate MVU. Ctx every 4-6min. FHTs for the last couple of hours with lower baseline in the 110s, moderate variability. More recently 108-109, has had a few late appearing decels to the upper 90s with ctx, still with moderate variability. Discussed with pt that the lower baseline could be due to Magnesium or due to Fentanyl, however with the late decels would recommend O2, position changes, etc. I would be unable to increase pitocin without resolution of these FHT changes and her cervix is now 3/90/-1 so still remote from delivery. Discussed that with chronic HTN/pre-eclampsia as well as IUGR the baby may not tolerate adequate labor and may need to proceed with C/S. Pt and her  were given a chance to ask questions, will attempt intrauterine resuscitation and continue to closely monitor. If no improvement would recommend C/S.    Crista Nicole MD    OB Progress Note - 1735    Pt more uncomfortable, now on 20mu/min of pitocin. SVE unchanged at 3/90/-1. FHT baselines 110s with variable/late decels to 90s with ctx. Moderate variability noted. MVU inadequate at 125. Discussed with pt and her , I do not feel comfortable increasing pitocin given the FHT tracing. She is remote from delivery and at this point would recommend C/S for nonreassuring fetal status remote from delivery. R/B discussed including risk of bleeding, infection, damage to surrounding structures (bowel, bladder, blood vessels, ovary, etc). Also discussed hx left ovarian cyst noted during the pregnancy, will assess at the time of surgery and could consider cystectomy or drainage of the cyst if indicated. All questions answered and informed consent signed.    Crista Nicole MD

## 2018-09-05 NOTE — PROVIDER NOTIFICATION
09/05/18 0715   Provider Notification   Provider Name/Title Dr. Nicole   Method of Notification Phone   Request Evaluate - Remote   Notification Reason Labor Status;Uterine Activity;Maternal Vital Sign Change;Status Update

## 2018-09-05 NOTE — PLAN OF CARE
Oxygen is applied at 1450 due to decreased baseline on FHT's.  Report is given to Rosalinda Gordon RN.

## 2018-09-05 NOTE — PLAN OF CARE
"2320: SBAR report received from Rosalinda WITT RN. Will assume all cares for this patient.  2325: Patient c/o increasing nausea. Patient requesting IV medicine. Zofran 4mg IV push given.  0000: Patient resting comfortably. Nausea improved. Patient having some cramping, but patient states \"I will be able to sleep through them.\"  0345: Patient up to bathroom with stand by assist. Patient denies headache, visual disturbances or epigastric pain. Patient states nausea is resolved. Patient having uterine cramping, but able to sleep through the cramping.  0600: Patient up bathroom. Patient denies headache, visual disturbances and epigastric pain. Patient c/o cramping but is able to sleep through.  0715: Dr. Nicole updated.  0720:SBAR report to Suzette PAGE RN to assume all cares for this patient.  "

## 2018-09-05 NOTE — ANESTHESIA PREPROCEDURE EVALUATION
Anesthesia Evaluation     . Pt has had prior anesthetic. Type: General    History of anesthetic complications   - PONV        ROS/MED HX    ENT/Pulmonary:     (+)asthma , . .   (-) tobacco use, COPD and sleep apnea   Neurologic:     (+)migraines,     Cardiovascular:     (+) hypertension----. : . . . :. .      (-) CAD   METS/Exercise Tolerance:     Hematologic:         Musculoskeletal:   (+) arthritis, , , -       GI/Hepatic:     (+) GERD liver disease, Other GI/Hepatic fatty liver      Renal/Genitourinary:      (-) renal disease   Endo:      (-) Type I DM and Type II DM   Psychiatric:         Infectious Disease:         Malignancy:         Other:                     Physical Exam  Normal systems: cardiovascular, pulmonary and dental    Airway   Mallampati: II  TM distance: >3 FB  Neck ROM: full    Dental     Cardiovascular       Pulmonary                     Anesthesia Plan      History & Physical Review  History and physical reviewed and following examination; no interval change.    ASA Status:  2 .    NPO Status:  > 8 hours    Plan for Spinal   PONV prophylaxis:  Ondansetron (or other 5HT-3)       Postoperative Care  Postoperative pain management:  Neuraxial analgesia.      Consents  Anesthetic plan, risks, benefits and alternatives discussed with:  Patient..                          .

## 2018-09-06 LAB
CO2 BLDCOV-SCNC: 21 MMOL/L (ref 21–28)
HCT VFR BLD CALC: 27 %PCV (ref 35–47)
HGB BLD CALC-MCNC: 9.2 G/DL (ref 11.7–15.7)
HGB BLD-MCNC: 8.7 G/DL (ref 11.7–15.7)
PCO2 BLDV: 36 MM HG (ref 40–50)
PH BLDV: 7.38 PH (ref 7.32–7.43)
PO2 BLDV: 101 MM HG (ref 25–47)
POTASSIUM BLD-SCNC: 3 MMOL/L (ref 3.4–5.3)
SAO2 % BLDV FROM PO2: 98 %
SODIUM BLD-SCNC: 137 MMOL/L (ref 133–144)

## 2018-09-06 PROCEDURE — 12000037 ZZH R&B POSTPARTUM INTERMEDIATE

## 2018-09-06 PROCEDURE — 25000132 ZZH RX MED GY IP 250 OP 250 PS 637: Performed by: OBSTETRICS & GYNECOLOGY

## 2018-09-06 PROCEDURE — 85018 HEMOGLOBIN: CPT | Performed by: OBSTETRICS & GYNECOLOGY

## 2018-09-06 PROCEDURE — 36415 COLL VENOUS BLD VENIPUNCTURE: CPT | Performed by: OBSTETRICS & GYNECOLOGY

## 2018-09-06 PROCEDURE — 25000128 H RX IP 250 OP 636: Performed by: OBSTETRICS & GYNECOLOGY

## 2018-09-06 RX ORDER — IBUPROFEN 400 MG/1
800 TABLET, FILM COATED ORAL EVERY 6 HOURS PRN
Status: DISCONTINUED | OUTPATIENT
Start: 2018-09-06 | End: 2018-09-08 | Stop reason: HOSPADM

## 2018-09-06 RX ORDER — FERROUS SULFATE 325(65) MG
325 TABLET ORAL DAILY
Status: DISCONTINUED | OUTPATIENT
Start: 2018-09-07 | End: 2018-09-08 | Stop reason: HOSPADM

## 2018-09-06 RX ADMIN — SENNOSIDES AND DOCUSATE SODIUM 1 TABLET: 8.6; 5 TABLET ORAL at 08:37

## 2018-09-06 RX ADMIN — SODIUM CHLORIDE, SODIUM LACTATE, POTASSIUM CHLORIDE, CALCIUM CHLORIDE AND DEXTROSE MONOHYDRATE: 5; 600; 310; 30; 20 INJECTION, SOLUTION INTRAVENOUS at 03:07

## 2018-09-06 RX ADMIN — NIFEDIPINE 60 MG: 60 TABLET, FILM COATED, EXTENDED RELEASE ORAL at 08:37

## 2018-09-06 RX ADMIN — ACETAMINOPHEN 975 MG: 325 TABLET, FILM COATED ORAL at 04:40

## 2018-09-06 RX ADMIN — ACETAMINOPHEN 975 MG: 325 TABLET, FILM COATED ORAL at 12:43

## 2018-09-06 RX ADMIN — IBUPROFEN 800 MG: 400 TABLET ORAL at 18:15

## 2018-09-06 NOTE — PLAN OF CARE
Problem: Hypertensive Disorders in Pregnancy (Adult,Obstetrics,Pediatric)  Goal: Signs and Symptoms of Listed Potential Problems Will be Absent, Minimized or Managed (Hypertensive Disorders in Pregnancy)  Signs and symptoms of listed potential problems will be absent, minimized or managed by discharge/transition of care (reference Hypertensive Disorders in Pregnancy (Adult,Obstetrics,Pediatric) CPG).   Outcome: Therapy, progress toward functional goals as expected  BP remaining stable on magnesium sulfate and her home BP meds.

## 2018-09-06 NOTE — PLAN OF CARE
Problem: Labor (Cervical Ripen, Induct, Augment) (Adult,Obstetrics,Pediatric)  Goal: Signs and Symptoms of Listed Potential Problems Will be Absent, Minimized or Managed (Labor)  Signs and symptoms of listed potential problems will be absent, minimized or managed by discharge/transition of care (reference Labor (Cervical Ripen, Induct, Augment) (Adult,Obstetrics,Pediatric) CPG).   Outcome: Completed Date Met: 09/05/18  Cervix unchanging.  Difficult to get into adequate labor while on magnesium sulfate gtt.  FHT's with low baseline 109, mod variability, occas variable decels to .  MD recommended CS delivery.  Pt and  wish to proceed with MD recommendations.  See delivery and OR summaries.  Baby boy @ 1901. Unable to obtain blood for arterial cord gas. Able to otain blood for venous cord gas.   Baby to NICU.

## 2018-09-06 NOTE — OP NOTE
Procedure Date: 2018      DATE OF SERVICE: 2018      PREOPERATIVE DIAGNOSES:   1.  Intrauterine pregnancy at 36+6 weeks.   2.  Non-reassuring fetal heart tones remote from delivery.   3.  Medical induction of labor for severe hypertension/preeclampsia with severe features.      POSTOPERATIVE DIAGNOSES:   1.  Intrauterine pregnancy at 36+6 weeks.   2.  Non-reassuring fetal heart tones remote from delivery.   3.  Medical induction of labor for severe hypertension/preeclampsia with severe features.      PROCEDURE:  Primary low transverse  section via Pfannenstiel.      SURGEON:  Crista Nicole MD       ASSISTANT:  OR staff.      ANESTHESIA:  Spinal with Duramorph.      ESTIMATED BLOOD LOSS:  1500 mL.      COMPLICATIONS:  Severe uterine atony.      SPECIMENS:   1.  Placenta.   2.  Cord blood.   3.  Cord gases.      OPERATIVE INDICATIONS:  The patient is a 38-year-old G1, para 0 at 36+6 weeks who had severe hypertension which was poorly controlled throughout the pregnancy on 3 antihypertensive medications.  She presented to Labor and Delivery with worsening blood pressures and unrelenting headache.  Blood pressures were found to be in the severe range.  She had normal labs.  The patient was started on magnesium and given IV antihypertensives.  Headache resolved with better blood pressure control and medical induction of labor was recommended.  She received Cervidil for cervical ripening and artificial rupture of membranes and Pitocin was commenced on the day of delivery.  She did not progress past 3 cm despite 20 milliunits of Pitocin.  Intrauterine pressure catheter was placed and contraction pattern was inadequate. However, she developed low fetal heart rate baseline around 107-108 as well as variable/late decelerations with each contraction to the 90s.  Therefore,  delivery was recommended due to her remote status from delivery.  The risks, benefits and alternatives were discussed,  including the risk of bleeding, infection and damage to surrounding structures such as bowel, bladder, blood vessels, etc.  The risk of blood transfusion, further surgery and even hysterectomy were discussed.  All questions were answered and informed consent was obtained.      FINDINGS:  Liveborn male infant in the cephalic presentation, weight 5 pounds 1 ounce, Apgars 7 and 7 at 1 and 5 minutes, respectively.  Cord gases were collected and are pending at the time of this dictation.  Amniotic fluid was clear.  Placenta was intact with a 3-vessel cord and sent to pathology.  Following delivery of the placenta, the uterus was very boggy with several subserosal myomas noted posteriorly.  There were no adhesions noted from the prior myomectomy.  There was no obvious left ovarian cyst noted. The left ovary was slightly larger than the right, but both were normal in appearance.  Fallopian tubes were normal bilaterally.  The uterine tone improved with administration of Hemabate, Methergine and tranexamic acid in addition to IV pitocin. Magnesium was also stopped just prior to the procedure.     DESCRIPTION OF PROCEDURE:  The patient was taken to the operating room where spinal anesthesia was undertaken.  She was then prepped and draped in the dorsal supine position with a leftward tilt.  Anesthesia was tested and found to be adequate.  Pfannenstiel skin incision was made with a scalpel and extended to the level of the fascia.  Fascia was incised in the midline and the incision extended laterally.  The fascial incision was elevated and rectus muscles dissected off sharply and bluntly.  Rectus muscles were  at the midline. The peritoneum was identified and entered bluntly.  Peritoneal incision was extended with good visualization of the bladder.  Bladder blade was inserted.  Transverse uterine incision was made with the scalpel and extended bluntly.  Amniotomy was performed and clear fluid returned.  The infant was  delivered without difficulty from the cephalic presentation.  Nose and mouth were bulb suctioned and cord was clamped and cut after a 30-second delay.  Placenta was then manually expressed and sent for formal pathology.  The uterus was exteriorized and cleared of all clots and debris.  The uterus was severely atonic and tranexamic acid was administered in addition to IV Pitocin.  She was also given Hemabate and Methergine.  Magnesium had been stopped just prior to surgery.  The uterine incision was reapproximated using 0 Vicryl in a running locked fashion.  A second layer of 0 Monocryl was placed in a horizontal imbricating fashion and hemostasis was noted.  The uterine tone was improved.  The posterior cul-de-sac was irrigated and the uterus was returned to the abdomen. The gutters were irrigated and cleared of all clots.  The uterine incision was reinspected and again hemostasis was noted.  There was no other intraabdominal bleeding apparent.  The peritoneum was brought together using 3-0 Vicryl in a running stitch.  Rectus muscles were inspected and made hemostatic with electrocautery.  Rectus muscles were brought together using 0 Vicryl in interrupted sutures.  Fascial incision was brought together using 0 Vicryl in a running stitch.  Subcutaneous tissue was copiously irrigated and made hemostatic with electrocautery.  Subcutaneous tissue was brought together using 3-0 chromic.  Skin was closed with staples, Steri-Strips were placed between each.  The patient tolerated the procedure well.  All sponge, lap and instrument counts were correct x 3.  She was taken to recovery room in stable condition.  She did have several clots expressed from the vagina; however, there was no ongoing heavy vaginal bleeding and the fundus was firm. She received Gentamycin and Clindamycin prior to the procedure for prophylaxis due to allergies.        MICHELLE REYES MD             D: 09/05/2018   T: 09/06/2018   MT:       Name:      JAMES PASTRANA   MRN:      [id number]        Account:        [id number]   :      1980           Procedure Date: 2018      Document: [id number]

## 2018-09-06 NOTE — PLAN OF CARE
Data: Kelly Wright transferred to 433 via bed at 2205. Baby transferred via parent's arms.  Action: Receiving unit notified of transfer: Yes. Patient and family notified of room change. Report given to Nery WEISS RN at 2210. Belongings sent to receiving unit. Accompanied by Registered Nurse. Oriented patient to surroundings. Call light within reach. ID bands double-checked with receiving RN.  Response: Patient tolerated transfer and is stable.

## 2018-09-06 NOTE — PLAN OF CARE
Problem: Patient Care Overview  Goal: Plan of Care/Patient Progress Review  Outcome: Improving  Vital signs stable, bp's slightly elevated. Bleeding wnl. Polo removed, voiding without difficulties. Up ad brianna. Saline lock in place. Taking tylenol for pain control, denies the need for ibuprofen. Tolerating a regular diet. Denies all s/s of preeclampsia. Normal reflexes and no clonus present. Bottle feeding formula and occassionally pumping per her choice. Encouraged to call with any questions or concerns. Will continue to monitor.

## 2018-09-06 NOTE — PROVIDER NOTIFICATION
Per Dr. Reynaga, only given patient nifedipine 60 mg this AM and let the rounding MD address other blood pressure medications.

## 2018-09-06 NOTE — PLAN OF CARE
Problem: Patient Care Overview  Goal: Plan of Care/Patient Progress Review  Outcome: No Change  VSS, fundus firm, scant flow, voiding adequately, ambulating independently. Bottle feeding every 3 hours. Pain well controlled with tylenol and ibuprofen. Will continue to monitor.

## 2018-09-06 NOTE — ANESTHESIA CARE TRANSFER NOTE
Patient: Kelly Wright    Procedure(s):   - Wound Class: II-Clean Contaminated    Diagnosis: pregnancy  Diagnosis Additional Information: No value filed.    Anesthesia Type:   Spinal     Note:  Airway :Room Air  Patient transferred to:PACU  Comments: Transferred to OB PACU recovery, spontaneous respirations on room air with oxygen saturations maintained greater than 98%. SpO2, NiBP, and EKG monitors and alarms on and functioning, report on patient's clinical status given to OB recovery RN, RN questions answered, patient in hospital bed with siderails up, Marty Hugger warmer connected to patient gown, Oxygen tubing connected to wall O2 in OB PACU Oxytocin 30 units in 500mL infusion connected to IV infusion pump in recovery bay and programmed to 100 mL/hr at handoff of care. Handoff Report: Identifed the Patient, Identified the Reponsible Provider, Reviewed the pertinent medical history, Discussed the surgical course, Reviewed Intra-OP anesthesia mangement and issues during anesthesia, Set expectations for post-procedure period and Allowed opportunity for questions and acknowledgement of understanding      Vitals: (Last set prior to Anesthesia Care Transfer)    CRNA VITALS  9/5/2018 1926 - 9/5/2018 2009      9/5/2018             Resp Rate (set): 10                Electronically Signed By: KAREN Huang CRNA  September 5, 2018  8:09 PM

## 2018-09-06 NOTE — OP NOTE
Brief Op Note    PreOp Dx: 1. IUP at 36w6d, 2. Nonreassuring fetal heart tones remote from delivery, 3. Severe hypertension  PostOp Dx: Same  Procedure: PLTCS via Pfannensteil  Surgeon: Bonnie  Assistant: OR staff  Anesthesia: Spinal w/ Duramorph  EBL: 1500cc  Complications: Severe uterine atony  Specimens: Placenta, cord blood, cord gases  Findings: Liveborn male infant, cephalic. Wt 5-1, Apgars 7/7, cord gases pending. Amniotic fluid clear. Placenta intact with 3VC. Uterus very boggy with several subserosal myomas posteriorly. No adhesions noted from prior myomectomy. No obvious left ovarian cyst seen, left ovary slightly larger than right but both normal in appearance. Tubes normal bilaterally.    Crista Nicole MD  #[id number]

## 2018-09-06 NOTE — PLAN OF CARE
Problem: Patient Care Overview  Goal: Plan of Care/Patient Progress Review  Outcome: Improving  VSS, pain well controlled with Tylenol.  Up in room with assistance, Tolerated well. Polo patent, adequate output. Scant vaginal bleeding. Incision CDI. Denies headache, blurred vision and epigastric pains.  Reflexes normal, no clonus. Working on breastfeeding  and pumping. On pathway. Continue to monitor and notify MD as needed.

## 2018-09-06 NOTE — PLAN OF CARE
2130: pt taken from main OR PACU 1st floor to NICU per bed by RN's to see her  son.  Pt A/O x3, denies pain.  :  Pt holding baby in NICU.  FF@U, mod rubra flow intially then scant flow.  Will need new peripad once in FCC room.   Pt denies pain.  Baby will be getting discharged from NICU and will be able to come up to FCC/NBN with parents per NICU RN.

## 2018-09-06 NOTE — ANESTHESIA POSTPROCEDURE EVALUATION
Patient: Kelly Wright    Procedure(s):   - Wound Class: II-Clean Contaminated    Diagnosis:pregnancy  Diagnosis Additional Information: No value filed.    Anesthesia Type:  Spinal    Note:  Anesthesia Post Evaluation    Patient location during evaluation: PACU  Patient participation: Able to fully participate in evaluation  Level of consciousness: awake and alert  Pain management: adequate  Airway patency: patent  Cardiovascular status: acceptable  Respiratory status: acceptable  Hydration status: acceptable  PONV: none     Anesthetic complications: None          Last vitals:  Vitals:    09/05/18 2040 09/05/18 2050 09/05/18 2100   BP: 112/76 117/80 108/73   Pulse:      Resp: 14 12 14   Temp: 36.3  C (97.4  F)  36.4  C (97.5  F)   SpO2: 98% 99% 98%         Electronically Signed By: Desmond Shi MD  September 5, 2018  9:32 PM

## 2018-09-06 NOTE — LACTATION NOTE
Initial Lactation visit. Hand out given. Recommend unlimited, frequent breast feedings: At least 8 - 12 times every 24 hours. Avoid pacifiers and supplementation with formula unless medically indicated. Explained benefits of holding baby skin on skin to help promote better breastfeeding outcomes.     Kelly has a LPT infant. She states she's undecided about breastfeeding. She states breastfeeding and pumping have been really painful for her. She's given her baby formula via a bottle for the last few feedings. Questions answered about keeping up her milk supply with skin to skin, hand expression and pumping until she has made a definite decision. Encouraged Kelly to continue calling staff or lactation for assist with feedings or pumping as needed. Will revisit as needed. Kelly and her  appreciative of my visit.     Crista Mosqueda RN IBCLC

## 2018-09-07 LAB
ALT SERPL W P-5'-P-CCNC: 20 U/L (ref 0–50)
ANION GAP SERPL CALCULATED.3IONS-SCNC: 8 MMOL/L (ref 3–14)
AST SERPL W P-5'-P-CCNC: 31 U/L (ref 0–45)
BUN SERPL-MCNC: 10 MG/DL (ref 7–30)
CALCIUM SERPL-MCNC: 8.4 MG/DL (ref 8.5–10.1)
CHLORIDE SERPL-SCNC: 108 MMOL/L (ref 94–109)
CO2 SERPL-SCNC: 27 MMOL/L (ref 20–32)
COPATH REPORT: NORMAL
CREAT SERPL-MCNC: 0.87 MG/DL (ref 0.52–1.04)
ERYTHROCYTE [DISTWIDTH] IN BLOOD BY AUTOMATED COUNT: 13.6 % (ref 10–15)
GFR SERPL CREATININE-BSD FRML MDRD: 73 ML/MIN/1.7M2
GLUCOSE SERPL-MCNC: 79 MG/DL (ref 70–99)
HCT VFR BLD AUTO: 25 % (ref 35–47)
HGB BLD-MCNC: 8.4 G/DL (ref 11.7–15.7)
MCH RBC QN AUTO: 28.7 PG (ref 26.5–33)
MCHC RBC AUTO-ENTMCNC: 33.6 G/DL (ref 31.5–36.5)
MCV RBC AUTO: 85 FL (ref 78–100)
PLATELET # BLD AUTO: 192 10E9/L (ref 150–450)
POTASSIUM SERPL-SCNC: 3.6 MMOL/L (ref 3.4–5.3)
RBC # BLD AUTO: 2.93 10E12/L (ref 3.8–5.2)
SODIUM SERPL-SCNC: 143 MMOL/L (ref 133–144)
WBC # BLD AUTO: 10.9 10E9/L (ref 4–11)

## 2018-09-07 PROCEDURE — 25000132 ZZH RX MED GY IP 250 OP 250 PS 637: Performed by: OBSTETRICS & GYNECOLOGY

## 2018-09-07 PROCEDURE — 80048 BASIC METABOLIC PNL TOTAL CA: CPT | Performed by: OBSTETRICS & GYNECOLOGY

## 2018-09-07 PROCEDURE — 85027 COMPLETE CBC AUTOMATED: CPT | Performed by: OBSTETRICS & GYNECOLOGY

## 2018-09-07 PROCEDURE — 12000037 ZZH R&B POSTPARTUM INTERMEDIATE

## 2018-09-07 PROCEDURE — 36415 COLL VENOUS BLD VENIPUNCTURE: CPT | Performed by: OBSTETRICS & GYNECOLOGY

## 2018-09-07 PROCEDURE — 84450 TRANSFERASE (AST) (SGOT): CPT | Performed by: OBSTETRICS & GYNECOLOGY

## 2018-09-07 PROCEDURE — 84460 ALANINE AMINO (ALT) (SGPT): CPT | Performed by: OBSTETRICS & GYNECOLOGY

## 2018-09-07 RX ORDER — NIFEDIPINE 30 MG/1
90 TABLET, EXTENDED RELEASE ORAL
Status: DISCONTINUED | OUTPATIENT
Start: 2018-09-07 | End: 2018-09-08 | Stop reason: HOSPADM

## 2018-09-07 RX ORDER — NIFEDIPINE 10 MG/1
10 CAPSULE ORAL ONCE
Status: COMPLETED | OUTPATIENT
Start: 2018-09-07 | End: 2018-09-07

## 2018-09-07 RX ORDER — LABETALOL 200 MG/1
200 TABLET, FILM COATED ORAL EVERY 12 HOURS
Status: DISCONTINUED | OUTPATIENT
Start: 2018-09-08 | End: 2018-09-08 | Stop reason: HOSPADM

## 2018-09-07 RX ORDER — LABETALOL 200 MG/1
200 TABLET, FILM COATED ORAL 2 TIMES DAILY
Status: DISCONTINUED | OUTPATIENT
Start: 2018-09-07 | End: 2018-09-07

## 2018-09-07 RX ADMIN — NIFEDIPINE 90 MG: 30 TABLET, FILM COATED, EXTENDED RELEASE ORAL at 08:47

## 2018-09-07 RX ADMIN — LABETALOL HCL 200 MG: 200 TABLET, FILM COATED ORAL at 16:30

## 2018-09-07 RX ADMIN — FERROUS SULFATE TAB 325 MG (65 MG ELEMENTAL FE) 325 MG: 325 (65 FE) TAB at 08:48

## 2018-09-07 RX ADMIN — SENNOSIDES AND DOCUSATE SODIUM 2 TABLET: 8.6; 5 TABLET ORAL at 08:47

## 2018-09-07 RX ADMIN — IBUPROFEN 800 MG: 400 TABLET ORAL at 12:54

## 2018-09-07 RX ADMIN — ACETAMINOPHEN 975 MG: 325 TABLET, FILM COATED ORAL at 00:43

## 2018-09-07 RX ADMIN — IBUPROFEN 800 MG: 400 TABLET ORAL at 19:11

## 2018-09-07 RX ADMIN — OXYCODONE HYDROCHLORIDE 5 MG: 5 TABLET ORAL at 22:22

## 2018-09-07 RX ADMIN — OXYCODONE HYDROCHLORIDE 5 MG: 5 TABLET ORAL at 00:43

## 2018-09-07 RX ADMIN — ACETAMINOPHEN 975 MG: 325 TABLET, FILM COATED ORAL at 23:57

## 2018-09-07 RX ADMIN — NIFEDIPINE 10 MG: 10 CAPSULE ORAL at 02:56

## 2018-09-07 RX ADMIN — ACETAMINOPHEN 975 MG: 325 TABLET, FILM COATED ORAL at 08:47

## 2018-09-07 RX ADMIN — ACETAMINOPHEN 975 MG: 325 TABLET, FILM COATED ORAL at 16:31

## 2018-09-07 RX ADMIN — IBUPROFEN 800 MG: 400 TABLET ORAL at 00:43

## 2018-09-07 RX ADMIN — SENNOSIDES AND DOCUSATE SODIUM 1 TABLET: 8.6; 5 TABLET ORAL at 21:16

## 2018-09-07 RX ADMIN — IBUPROFEN 800 MG: 400 TABLET ORAL at 06:41

## 2018-09-07 NOTE — PROGRESS NOTES
September 6, 2018  Caesarian Section Daily Progress NOTE - PostOp DAY# 1    SUBJECTIVE: Feels good.  Only taking tylenol for pain. VB light.    Pain controlled? Yes  Tolerating a regular diet? Yes  Ambulating? Yes  Voiding without difficulty? Yes  Lochia? minimal      OBJECTIVE:    Constitutional: Healthy, alert and in no distress.   Abdomen:       Uterine fundus is firm, non-tender and at the level of the umbilicus  Incision:          Clean, Dry and Intact      LABS:  Hemoglobin   Date Value Ref Range Status   09/06/2018 8.7 (L) 11.7 - 15.7 g/dL Final   09/05/2018 9.2 (L) 11.7 - 15.7 g/dL Final         ASSESSMENT:   Post Op Day: 1  Primary LTCS    Poorly controlled chronic HTN - Was on 3 BP meds prior to delivery.  Today has only received nifedipine XR 60 mg this AM.  Overall BP's stable today and no s/s preeclampsia  Acute blood loss anemia - asymptomatic       PLAN:      Watch BP closely, will increase nifedipine if needed to 90 mg/d and add labetalol also if needed  Repeat preclampsia labs tomorrow  Start oral iron tomorrow        Mayda Dior

## 2018-09-07 NOTE — PLAN OF CARE
Problem: Patient Care Overview  Goal: Plan of Care/Patient Progress Review  Outcome: Improving  Post labetalol dose VSS, headache resolved, fundus firm, scant flow, voiding adequately, ambulating independently. Bottle feeding infant, encouraged at least 8x in 24 hours. Pain well controlled with tylenol and ibuprofen. Will continue to monitor.

## 2018-09-07 NOTE — PROGRESS NOTES
September 7, 2018  Caesarian Section Daily Progress NOTE - PostOp DAY# 2    SUBJECTIVE: Sore today but overall doing well.  No N/V.  No HA's.  VB light.    Pain controlled? Yes  Tolerating a regular diet? Yes  Ambulating? Yes  Voiding without difficulty? Yes  Lochia? minimal      OBJECTIVE:  Vitals:    09/07/18 0040 09/07/18 0213 09/07/18 0406 09/07/18 0548   BP: (!) 157/95 (!) 158/92 (!) 142/97    Pulse:       Resp: 18 18    Temp: 98.6  F (37  C)  98.9  F (37.2  C)    TempSrc: Oral  Oral    SpO2:       Weight:    81.2 kg (179 lb)   Height:           Constitutional: Healthy, alert and in no distress.   Abdomen:       Uterine fundus is firm, non-tender and at the level of the umbilicus  Incision:          Clean, Dry and Intact      LABS:  Hemoglobin   Date Value Ref Range Status   09/06/2018 8.7 (L) 11.7 - 15.7 g/dL Final   09/05/2018 9.2 (L) 11.7 - 15.7 g/dL Final         ASSESSMENT:   Post Op Day: 2  Primary LTCS    Poorly controlled chronic HTN - was on 3 BP meds prior to delivery (labetalol, nifedipine and aldomet) - yesterday only needed 60 mg of nifedipine XR but BP's increased overnight. Pt asymptomatic  Acute blood loss anemia - asymptomatic         PLAN:      Repeat preeclampsia labs this AM  Increase nifedipine XR to 90 mg/d this AM - may need to add labetalol  DC orders not done due to BP concerns and potential need to change meds  Start oral iron today     Mayda Dior

## 2018-09-07 NOTE — PROVIDER NOTIFICATION
MD Dior notified of 1600 vital signs, see headache and associated headache. Ordered 200mg labetalol BID, first dose now. Recheck blood pressure one hour after dose given, notify if greater than 160/110, continue checking every four hours. Will continue to monitor.

## 2018-09-07 NOTE — PROVIDER NOTIFICATION
09/07/18 0233   Provider Notification   Provider Name/Title Dr. Reynaga   Method of Notification Phone   Request Evaluate-Remote   Notification Reason Vital Signs Change     Provider updated on elevated blood pressures. 10 mg Nifedipine ordered now onetime. And AM dose of nifedipine order increased to 90 mg.

## 2018-09-07 NOTE — PLAN OF CARE
Problem: Patient Care Overview  Goal: Plan of Care/Patient Progress Review  Outcome: Improving  Blood pressures elevated, see MD notification note. Improved after Nifedipine dose. Other VSS, adequate urine output, pain controlled with ibuprofen, tylenol, and oxycodone. Will continue to monitor patient

## 2018-09-08 VITALS
HEART RATE: 102 BPM | OXYGEN SATURATION: 99 % | WEIGHT: 174.4 LBS | DIASTOLIC BLOOD PRESSURE: 97 MMHG | SYSTOLIC BLOOD PRESSURE: 151 MMHG | HEIGHT: 64 IN | RESPIRATION RATE: 16 BRPM | BODY MASS INDEX: 29.78 KG/M2 | TEMPERATURE: 98.1 F

## 2018-09-08 LAB
ALT SERPL W P-5'-P-CCNC: 23 U/L (ref 0–50)
ANION GAP SERPL CALCULATED.3IONS-SCNC: 6 MMOL/L (ref 3–14)
AST SERPL W P-5'-P-CCNC: 24 U/L (ref 0–45)
BUN SERPL-MCNC: 11 MG/DL (ref 7–30)
CALCIUM SERPL-MCNC: 8.4 MG/DL (ref 8.5–10.1)
CHLORIDE SERPL-SCNC: 106 MMOL/L (ref 94–109)
CO2 SERPL-SCNC: 29 MMOL/L (ref 20–32)
CREAT SERPL-MCNC: 0.9 MG/DL (ref 0.52–1.04)
ERYTHROCYTE [DISTWIDTH] IN BLOOD BY AUTOMATED COUNT: 13.5 % (ref 10–15)
GFR SERPL CREATININE-BSD FRML MDRD: 70 ML/MIN/1.7M2
GLUCOSE SERPL-MCNC: 124 MG/DL (ref 70–99)
HCT VFR BLD AUTO: 24.5 % (ref 35–47)
HGB BLD-MCNC: 8.4 G/DL (ref 11.7–15.7)
MCH RBC QN AUTO: 29.3 PG (ref 26.5–33)
MCHC RBC AUTO-ENTMCNC: 34.3 G/DL (ref 31.5–36.5)
MCV RBC AUTO: 85 FL (ref 78–100)
PLATELET # BLD AUTO: 218 10E9/L (ref 150–450)
POTASSIUM SERPL-SCNC: 3.5 MMOL/L (ref 3.4–5.3)
RBC # BLD AUTO: 2.87 10E12/L (ref 3.8–5.2)
SODIUM SERPL-SCNC: 141 MMOL/L (ref 133–144)
WBC # BLD AUTO: 11.4 10E9/L (ref 4–11)

## 2018-09-08 PROCEDURE — 85027 COMPLETE CBC AUTOMATED: CPT | Performed by: OBSTETRICS & GYNECOLOGY

## 2018-09-08 PROCEDURE — 25000132 ZZH RX MED GY IP 250 OP 250 PS 637: Performed by: OBSTETRICS & GYNECOLOGY

## 2018-09-08 PROCEDURE — 84460 ALANINE AMINO (ALT) (SGPT): CPT | Performed by: OBSTETRICS & GYNECOLOGY

## 2018-09-08 PROCEDURE — 84450 TRANSFERASE (AST) (SGOT): CPT | Performed by: OBSTETRICS & GYNECOLOGY

## 2018-09-08 PROCEDURE — 80048 BASIC METABOLIC PNL TOTAL CA: CPT | Performed by: OBSTETRICS & GYNECOLOGY

## 2018-09-08 PROCEDURE — 36415 COLL VENOUS BLD VENIPUNCTURE: CPT | Performed by: OBSTETRICS & GYNECOLOGY

## 2018-09-08 RX ORDER — OXYCODONE HYDROCHLORIDE 5 MG/1
5-10 TABLET ORAL
Qty: 20 TABLET | Refills: 0 | Status: SHIPPED | OUTPATIENT
Start: 2018-09-08 | End: 2018-10-26

## 2018-09-08 RX ORDER — LABETALOL 200 MG/1
200 TABLET, FILM COATED ORAL EVERY 12 HOURS
Start: 2018-09-08 | End: 2018-10-26

## 2018-09-08 RX ADMIN — IBUPROFEN 800 MG: 400 TABLET ORAL at 04:17

## 2018-09-08 RX ADMIN — SENNOSIDES AND DOCUSATE SODIUM 2 TABLET: 8.6; 5 TABLET ORAL at 08:13

## 2018-09-08 RX ADMIN — FERROUS SULFATE TAB 325 MG (65 MG ELEMENTAL FE) 325 MG: 325 (65 FE) TAB at 08:12

## 2018-09-08 RX ADMIN — LABETALOL HCL 200 MG: 200 TABLET, FILM COATED ORAL at 04:17

## 2018-09-08 RX ADMIN — NIFEDIPINE 90 MG: 30 TABLET, FILM COATED, EXTENDED RELEASE ORAL at 08:12

## 2018-09-08 RX ADMIN — IBUPROFEN 800 MG: 400 TABLET ORAL at 10:15

## 2018-09-08 RX ADMIN — ACETAMINOPHEN 975 MG: 325 TABLET, FILM COATED ORAL at 08:13

## 2018-09-08 ASSESSMENT — ACTIVITIES OF DAILY LIVING (ADL)
RETIRED_EATING: 0-->INDEPENDENT
SWALLOWING: 0-->SWALLOWS FOODS/LIQUIDS WITHOUT DIFFICULTY
FALL_HISTORY_WITHIN_LAST_SIX_MONTHS: NO
DRESS: 0-->INDEPENDENT
TOILETING: 0-->INDEPENDENT
TRANSFERRING: 0-->INDEPENDENT
BATHING: 0-->INDEPENDENT
COGNITION: 0 - NO COGNITION ISSUES REPORTED
RETIRED_COMMUNICATION: 0-->UNDERSTANDS/COMMUNICATES WITHOUT DIFFICULTY
AMBULATION: 0-->INDEPENDENT

## 2018-09-08 NOTE — DISCHARGE INSTRUCTIONS
Postop  Birth Instructions    Activity       Do not lift more than 10 pounds for 6 weeks after surgery.  Ask family and friends for help when you need it.    No driving until you have stopped taking your pain medications (usually two weeks after surgery).    No heavy exercise or activity for 6 weeks.  Don't do anything that will put a strain on your surgery site.    Don't strain when using the toilet.  Your care team may prescribe a stool softener if you have problems with your bowel movements.     To care for your incision:       Keep the incision clean and dry.    Do not soak your incision in water. No swimming or hot tubs until it has fully healed. You may soak in the bathtub if the water level is below your incision.    Do not use peroxide, gel, cream, lotion, or ointment on your incision.    Adjust your clothes to avoid pressure on your surgery site (check the elastic in your underwear for example).     You may see a small amount of clear or pink drainage and this is normal.  Check with your health care provider:       If the drainage increases or has an odor.    If the incision reddens, you have swelling, or develop a rash.    If you have increased pain and the medicine we prescribed doesn't help.    If you have a fever above 100.4 F (38 C) with or without chills when placing thermometer under your tongue.   The area around your incision (surgery wound), will feel numb.  This is normal. The numbness should go away in less than a year.     Keep your hands clean:  Always wash your hands before touching your incision (surgery wound). This helps reduce your risk of infection. If your hands aren't dirty, you may use an alcohol hand-rub to clean your hands. Keep your nails clean and short.    Call your healthcare provider if you have any of these symptoms:       You soak a sanitary pad with blood within 1 hour, or you see blood clots larger than a golf ball.    Bleeding that lasts more than 6  weeks.    Vaginal discharge that smells bad.    Severe pain, cramping or tenderness in your lower belly area.    A need to urinate more frequently (use the toilet more often), more urgently (use the toilet very quickly), or it burns when you urinate.    Nausea and vomiting.    Redness, swelling or pain around a vein in your leg.    Problems breastfeeding or a red or painful area on your breast.    Chest pain and cough or are gasping for air.    Problems with coping with sadness, anxiety or depression. If you have concerns about hurting yourself or the baby, call your provider immediately.      You have questions or concerns after you return home.        Preeclampsia   Call your doctor right away if you have any of the following:  - Edema (swelling) in your face or hands  - Rapid weight gain-about 1 pound or more in a day  - Headache  - Abdominal pain on your right side  - Vision problems (flashes or spots)  - You have questions or concerns once you return home.

## 2018-09-08 NOTE — PLAN OF CARE
Problem: Patient Care Overview  Goal: Plan of Care/Patient Progress Review  Outcome: No Change  Pain controlled with tylenol  and motrin  Occasionally takes oxycodone BP runs  155/95, 149/95,152/96 this shift  denies   headache  ,bluured vision and epigastric pain this time +2reflex no clonus on labetalol every 12 hrs  incision clean dry intact with steri strips and staples  passing flatus up ambulating voidng adequate bottle feeding baby   encouraged to call with needs continue to monitor and notify MD as needed

## 2018-09-08 NOTE — PROGRESS NOTES
"POD 3  S.  Wants to go home.  Labetalol was added. No PIH symptoms  O.  AVSS  BP (!) 151/97  Pulse 102  Temp 98.1  F (36.7  C) (Oral)  Resp 16  Ht 1.626 m (5' 4\")  Wt 79.1 kg (174 lb 6.4 oz)  LMP 11/19/2017  SpO2 99%  Breastfeeding? Unknown  BMI 29.94 kg/m2  BP now 139/89  Abd soft  Incision clean and intact    Recent Labs  Lab 09/08/18  0720 09/07/18  0852 09/06/18  0835 09/05/18  1945 09/05/18  1940   HGB 8.4* 8.4* 8.7* 9.2* 9.9*   A.  Chronic HTN, now on nifedipine and labetalol, good at managing meds.  Tolerating anemia well.  P.  Discharge to home with short term follow up this week.  Resume iron. Remove staples. /LBakerMD  "

## 2018-09-08 NOTE — PLAN OF CARE
Problem: Patient Care Overview  Goal: Plan of Care/Patient Progress Review  Outcome: Adequate for Discharge Date Met: 09/08/18  D: VSS, assessments WDL.  I: Pt received complete discharge paperwork and home medications as filled by discharge pharmacy -labetalol. Pt was given times of last dose for all discharge medications in writing on discharge medication sheets. Discharge teaching included home medication, pain management activity restrictions, postpartum cares and symptoms of infection.   A: Discharge outcomes on care plan met. Mother states understanding and comfort with self and baby cares.  P: Pt discharged to home. Pt was discharged with baby, and bands checked at time of discharge. Pt was accompanied by , nurse and baby, and left with personal belongings. Home care sent. Pt to follow up with OB per MD order. Pt had no further questions at this time and no unmet needs were identified.

## 2018-09-12 ENCOUNTER — DOCUMENTATION ONLY (OUTPATIENT)
Dept: CARE COORDINATION | Facility: CLINIC | Age: 38
End: 2018-09-12

## 2018-09-12 NOTE — PROGRESS NOTES
[facility] will be sharing updates with you on Maternal Child Health Referral requests for home care services.  This is for care coordination purposes and alert you to referral status.  We received the referral for  Kelly Wright; MRN [id number] and want to update you:    Home visit for postpartum/  assessment and education offered to patient.  Patient declined need for home care visit.  Advised to follow up with Primary Care Providers for mom and baby.       Sincerely [facility]  Kiya Serrano RN  [phone]

## 2018-09-14 NOTE — DISCHARGE SUMMARY
Date of Admission: 9/4/18    Date of Discharge: 9/8/18    Admission Diagnosis: 1. IUP at 36w6d, 2. Chronic HTN with superimposed pre-eclampsia with severe features    Discharge Diagnosis: Same, delivered    Operations/Procedures Performed: primary C/S    Complications: None apparent    Hospital Course: The patient was admitted for [facility] due to severe range BPs despite 3 BP meds and significant unrelenting HA. She received cervical ripening followed by pitocin and progressed to 3cm with no change despite 20mU/min of pitocin and FHT tracing with decelerations. C/S was recommended as she was remote from delivery. Please see operative report for further details. EBL was 1500cc. Hemoglobin on POD#1 was 8.7. Patient was discharged home on POD# 3. At the time of discharge, she was voiding, ambulating and tolerating a regular diet. Her pain was well controlled with PO pain meds. Staples were removed prior to discharge.    Discharge Plans:  1. Pt was instructed to call with pain not controlled with PO pain meds, temp > 100.4, bleeding > 1 pad/hr for 2hrs, or concerns about her incision.  2. Pt was instructed no lifting > 10lbs x6wks, no driving x2 wks or while on narcotics, and pelvic rest x6 wks.  3. Pt will return to clinic in 2 wks for post-op check and 6 wks for postpartum check.    Crista Nicole

## 2018-10-16 ENCOUNTER — TELEPHONE (OUTPATIENT)
Dept: CARDIOLOGY | Facility: CLINIC | Age: 38
End: 2018-10-16

## 2018-10-16 NOTE — TELEPHONE ENCOUNTER
Chart prepping for OV 10-26-18 with Dr. Knight with recommendation of EKG same day and scheduled for labs and echo prior (on 10-23-18). Patient hospitalized 9-4-18 to 9-8-18 Admission Diagnosis: 1. IUP at 36w6d, 2. Chronic HTN with superimposed pre-eclampsia with severe features - delivered baby.

## 2018-10-23 ENCOUNTER — HOSPITAL ENCOUNTER (OUTPATIENT)
Dept: CARDIOLOGY | Facility: CLINIC | Age: 38
Discharge: HOME | End: 2018-10-23
Attending: INTERNAL MEDICINE | Admitting: INTERNAL MEDICINE
Payer: COMMERCIAL

## 2018-10-23 DIAGNOSIS — O13.1 PREGNANCY-INDUCED HYPERTENSION IN FIRST TRIMESTER: ICD-10-CM

## 2018-10-23 LAB
ANION GAP SERPL CALCULATED.3IONS-SCNC: 10.7 MMOL/L (ref 6–17)
BASOPHILS # BLD AUTO: 0 10E9/L (ref 0–0.2)
BASOPHILS NFR BLD AUTO: 0.5 %
BUN SERPL-MCNC: 19 MG/DL (ref 7–30)
CALCIUM SERPL-MCNC: 9.6 MG/DL (ref 8.5–10.5)
CHLORIDE SERPL-SCNC: 103 MMOL/L (ref 98–107)
CO2 SERPL-SCNC: 29 MMOL/L (ref 23–29)
CREAT SERPL-MCNC: 1.09 MG/DL (ref 0.7–1.3)
DIFFERENTIAL METHOD BLD: NORMAL
EOSINOPHIL # BLD AUTO: 0.2 10E9/L (ref 0–0.7)
EOSINOPHIL NFR BLD AUTO: 3.5 %
ERYTHROCYTE [DISTWIDTH] IN BLOOD BY AUTOMATED COUNT: 13.1 % (ref 10–15)
GFR SERPL CREATININE-BSD FRML MDRD: 56 ML/MIN/1.7M2
GLUCOSE SERPL-MCNC: 111 MG/DL (ref 70–105)
HCT VFR BLD AUTO: 39 % (ref 35–47)
HGB BLD-MCNC: 13 G/DL (ref 11.7–15.7)
IMM GRANULOCYTES # BLD: 0 10E9/L (ref 0–0.4)
IMM GRANULOCYTES NFR BLD: 0.2 %
LYMPHOCYTES # BLD AUTO: 2.6 10E9/L (ref 0.8–5.3)
LYMPHOCYTES NFR BLD AUTO: 47.8 %
MCH RBC QN AUTO: 28.1 PG (ref 26.5–33)
MCHC RBC AUTO-ENTMCNC: 33.3 G/DL (ref 31.5–36.5)
MCV RBC AUTO: 84 FL (ref 78–100)
MONOCYTES # BLD AUTO: 0.2 10E9/L (ref 0–1.3)
MONOCYTES NFR BLD AUTO: 4.4 %
NEUTROPHILS # BLD AUTO: 2.4 10E9/L (ref 1.6–8.3)
NEUTROPHILS NFR BLD AUTO: 43.6 %
NRBC # BLD AUTO: 0 10*3/UL
NRBC BLD AUTO-RTO: 0 /100
NT-PROBNP SERPL-MCNC: 27 PG/ML (ref 0–125)
PLATELET # BLD AUTO: 312 10E9/L (ref 150–450)
POTASSIUM SERPL-SCNC: 3.7 MMOL/L (ref 3.5–5.1)
RBC # BLD AUTO: 4.63 10E12/L (ref 3.8–5.2)
SODIUM SERPL-SCNC: 139 MMOL/L (ref 136–145)
WBC # BLD AUTO: 5.5 10E9/L (ref 4–11)

## 2018-10-23 PROCEDURE — 83880 ASSAY OF NATRIURETIC PEPTIDE: CPT | Performed by: INTERNAL MEDICINE

## 2018-10-23 PROCEDURE — 36415 COLL VENOUS BLD VENIPUNCTURE: CPT | Performed by: INTERNAL MEDICINE

## 2018-10-23 PROCEDURE — 85025 COMPLETE CBC W/AUTO DIFF WBC: CPT | Performed by: INTERNAL MEDICINE

## 2018-10-23 PROCEDURE — 93321 DOPPLER ECHO F-UP/LMTD STD: CPT | Mod: 26 | Performed by: INTERNAL MEDICINE

## 2018-10-23 PROCEDURE — 93325 DOPPLER ECHO COLOR FLOW MAPG: CPT | Mod: 26 | Performed by: INTERNAL MEDICINE

## 2018-10-23 PROCEDURE — 93308 TTE F-UP OR LMTD: CPT | Mod: 26 | Performed by: INTERNAL MEDICINE

## 2018-10-23 PROCEDURE — 93321 DOPPLER ECHO F-UP/LMTD STD: CPT

## 2018-10-23 PROCEDURE — 80048 BASIC METABOLIC PNL TOTAL CA: CPT | Performed by: INTERNAL MEDICINE

## 2018-10-26 ENCOUNTER — OFFICE VISIT (OUTPATIENT)
Dept: CARDIOLOGY | Facility: CLINIC | Age: 38
End: 2018-10-26
Attending: INTERNAL MEDICINE
Payer: COMMERCIAL

## 2018-10-26 VITALS
HEIGHT: 64 IN | SYSTOLIC BLOOD PRESSURE: 142 MMHG | HEART RATE: 74 BPM | BODY MASS INDEX: 29.19 KG/M2 | WEIGHT: 171 LBS | OXYGEN SATURATION: 98 % | DIASTOLIC BLOOD PRESSURE: 96 MMHG

## 2018-10-26 DIAGNOSIS — O13.1 PREGNANCY-INDUCED HYPERTENSION IN FIRST TRIMESTER: ICD-10-CM

## 2018-10-26 PROCEDURE — 93000 ELECTROCARDIOGRAM COMPLETE: CPT | Performed by: INTERNAL MEDICINE

## 2018-10-26 PROCEDURE — 99212 OFFICE O/P EST SF 10 MIN: CPT | Performed by: INTERNAL MEDICINE

## 2018-10-26 NOTE — MR AVS SNAPSHOT
After Visit Summary   10/26/2018    Kelly Wright    MRN: [id number]           Patient Information     Date Of Birth          1980        Visit Information        Provider Department      10/26/2018 3:45 PM Tom Knight MD [facility]        Today's Diagnoses     Pregnancy-induced hypertension in first trimester          Care Instructions    1. Gradually attempt to wean off Nifedipine after 3 months, under the guidance of Dr. Dior.    2. Follow up with me in 6 months.    If you have any questions or concerns, please contact my nurses at [phone].            Follow-ups after your visit        Additional Services     Follow-Up with Cardiologist                 Future tests that were ordered for you today     Open Future Orders        Priority Expected Expires Ordered    Basic metabolic panel Routine 4/24/2019 10/26/2019 10/26/2018    CBC with platelets differential Routine 4/24/2019 10/26/2019 10/26/2018    Follow-Up with Cardiologist Routine 4/24/2019 10/26/2019 10/26/2018            Who to contact     If you have questions or need follow up information about today's clinic visit or your schedule please contact [facility] directly at [phone].  Normal or non-critical lab and imaging results will be communicated to you by MyChart, letter or phone within 4 business days after the clinic has received the results. If you do not hear from us within 7 days, please contact the clinic through MyChart or phone. If you have a critical or abnormal lab result, we will notify you by phone as soon as possible.  Submit refill requests through [employer] or call your pharmacy and they will forward the refill request to us. Please allow 3 business days for your refill to be completed.          Additional Information About Your Visit        [place] Information     [employer] gives you secure access to your electronic health  "record. If you see a primary care provider, you can also send messages to your care team and make appointments. If you have questions, please call your primary care clinic.  If you do not have a primary care provider, please call [phone] and they will assist you.        Care EveryWhere ID     This is your Care EveryWhere ID. This could be used by other organizations to access your [place] medical records  [id number]        Your Vitals Were     Pulse Height Last Period Pulse Oximetry BMI (Body Mass Index)       74 1.626 m (5' 4\") 11/19/2017 98% 29.35 kg/m2        Blood Pressure from Last 3 Encounters:   10/26/18 (!) 142/96   09/08/18 (!) 151/97   08/23/18 121/85    Weight from Last 3 Encounters:   10/26/18 77.6 kg (171 lb)   09/08/18 79.1 kg (174 lb 6.4 oz)   08/23/18 79.3 kg (174 lb 12.8 oz)              We Performed the Following     EKG 12-lead complete w/read - Clinics (to be scheduled)     Follow-Up with Cardiologist          Today's Medication Changes          These changes are accurate as of 10/26/18  4:20 PM.  If you have any questions, ask your nurse or doctor.               Stop taking these medicines if you haven't already. Please contact your care team if you have questions.     labetalol 200 MG tablet   Commonly known as:  NORMODYNE   Stopped by:  Tom Knight MD           LABETALOL HCL PO   Stopped by:  Tom Knight MD           oxyCODONE IR 5 MG tablet   Commonly known as:  ROXICODONE   Stopped by:  Tom Knight MD                    Primary Care Provider Office Phone # Fax #    Mayda Dior [phone]       [facility] [address] [address]        Equal Access to Services     KENDY COELHO : Ingrid Schmidt, waaxda luqadaha, qaybta kaalmaashok sterling, rex keller. [facility] [phone].    ATENCIÓN: Si habla español, tiene a orozco disposición servicios gratuitos de asistencia " lingüísticaPaula Kaufman al [phone].    We comply with applicable federal civil rights laws and [place] laws. We do not discriminate on the basis of race, color, national origin, age, disability, sex, sexual orientation, or gender identity.            Thank you!     Thank you for choosing [facility]   [facility]  for your care. Our goal is always to provide you with excellent care. Hearing back from our patients is one way we can continue to improve our services. Please take a few minutes to complete the written survey that you may receive in the mail after your visit with us. Thank you!             Your Updated Medication List - Protect others around you: Learn how to safely use, store and throw away your medicines at www.disposemymeds.org.          This list is accurate as of 10/26/18  4:20 PM.  Always use your most recent med list.                   Brand Name Dispense Instructions for use Diagnosis    albuterol 108 (90 Base) MCG/ACT inhaler    PROAIR HFA    1 Inhaler    Inhale 2 puffs into the lungs every 6 hours as needed for shortness of breath / dyspnea Due for office visit: [phone]    Mild intermittent asthma without complication       ASTHMA CONTROLLER MEDICATION NOT PRESCRIBED (INTENTIONAL)     0 each    Asthma controller medication not prescribed intentionally due to Other:not needed    Intermittent asthma       fluticasone 50 MCG/ACT spray    FLONASE    3 Package    Spray 1-2 sprays into both nostrils daily    Seasonal allergies       NIFEdipine ER 90 MG Tb24    ADALAT CC    100 tablet    Take 1 tablet (90 mg) by mouth daily Take 60 mg in the morning and 30 mg in the evening.    Pregnancy-induced hypertension in second trimester       prenatal multivitamin plus iron 27-0.8 MG Tabs per tablet      Take 2 tablets by mouth 2 times daily        PROBIOTIC PO           TYLENOL PO      Take by mouth as needed for mild pain or fever        ZYRTEC-D PO      Take by mouth daily

## 2018-10-26 NOTE — LETTER
10/26/2018      Mayda Dior MD  [facility] [address]  [facility] [address]      RE: Kelly Wright       Dear Colleague,    I had the pleasure of seeing Kelly Wright in the [facility].    Service Date: 10/26/2018      [facility]       PRIMARY OBSTETRITIAN AND REFERRING PROVIDER:  Dr. Mayda Dior MD, [facility] [place] [address].      REASON FOR VISIT:   1.  Followup of pregnancy-induced hypertension following delivery 7 weeks ago.      HISTORY OF PRESENT ILLNESS:  Kelly is well known to me from previous visits.  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  Her secondary hypertension workup had been negative.      Kelly's pregnancy has been complicated by hypertension that has been somewhat difficult to treat, and we had to maintain her on high-dose labetalol, nifedipine and alpha methyldopa.  She underwent elective induction which did not work and therefore progressed to  section at 37 weeks of gestation.  Just prior to that, her blood pressure had become very markedly elevated.  She delivered a healthy male child, and I was delighted to meet him today.  His name is Han, he is 7 weeks old and is healthy and beautiful.      Following delivery, as expected, Kelly has had a progressive decline in blood pressure and has been able to come off of labetalol and the alpha methyldopa.  She is on nifedipine ER 90 mg in the morning and 30 mg in the evening, and with this her home blood pressure logs have been in the 120-130/70-80s range.  Today her blood pressure was elevated at 142/96 mmHg with a pulse of 74 BPM (rechecked).  Fortunately, she is  completely asymptomatic.  She has not been able to nurse.      CURRENT MEDICATIONS:  Nifedipine ER, Adalat CC 90 mg in the morning and 30 mg in the evening.      PHYSICAL EXAMINATION   VITAL SIGNS:  Per my note.      DIAGNOSES:   1.  Pregnancy-induced hypertension in the first trimester.      I am very glad that James had a successful  delivery and has a healthy baby.  Her blood pressure continues to come down.  It is a little elevated today, but I do not want to make any changes.  My hope is that at 3-6 months postpartum she will be able to come off nifedipine completely.  Of course, there is the possibility that she had true essential hypertension even prior to the pregnancy and may need low-dose medication.      I am going to give her a break from clinic followups.  I will see her back in 6 months to revisit the issue of hypertension.      It was my pleasure to visit with James and her wonderful baby.  I look forward to seeing her again.        cc:   Mayda Dior MD    [facility]    [address] [place] [address] TRES SPRINGER MD             D: 10/26/2018   T: 10/26/2018   MT: KOREY      Name:     JAMES PASTRANA   MRN:      [id number]        Account:      [id number]   :      1980           Service Date: 10/26/2018      Document: [id number]         Outpatient Encounter Prescriptions as of 10/26/2018   Medication Sig Dispense Refill     Acetaminophen (TYLENOL PO) Take by mouth as needed for mild pain or fever       albuterol (PROAIR HFA) 108 (90 BASE) MCG/ACT Inhaler Inhale 2 puffs into the lungs every 6 hours as needed for shortness of breath / dyspnea Due for office visit: [phone] 1 Inhaler 1     ASTHMA CONTROLLER MEDICATION NOT PRESCRIBED, INTENTIONAL, Asthma controller medication not prescribed intentionally due to Other:not needed 0 each 0     Cetirizine-Pseudoephedrine (ZYRTEC-D PO) Take by mouth daily        fluticasone (FLONASE)  50 MCG/ACT nasal spray Spray 1-2 sprays into both nostrils daily 3 Package 3     NIFEdipine ER (ADALAT CC) 90 MG TB24 Take 1 tablet (90 mg) by mouth daily Take 60 mg in the morning and 30 mg in the evening. 100 tablet 3     Prenatal Vit-Fe Fumarate-FA (PRENATAL MULTIVITAMIN PLUS IRON) 27-0.8 MG TABS per tablet Take 2 tablets by mouth 2 times daily       Probiotic Product (PROBIOTIC PO)        [DISCONTINUED] labetalol (NORMODYNE) 200 MG tablet Take 1 tablet (200 mg) by mouth every 12 hours (Patient not taking: Reported on 10/26/2018)       [DISCONTINUED] LABETALOL HCL PO Take 600 mg by mouth 2 times daily        [DISCONTINUED] oxyCODONE IR (ROXICODONE) 5 MG tablet Take 1-2 tablets (5-10 mg) by mouth every 3 hours as needed for other (pain control or improvement in physical function. Hold dose for analgesic side effects.) (Patient not taking: Reported on 10/26/2018) 20 tablet 0     No facility-administered encounter medications on file as of 10/26/2018.        Again, thank you for allowing me to participate in the care of your patient.      Sincerely,    Tom Knight MD     [facility]

## 2018-10-26 NOTE — PROGRESS NOTES
Service Date: 10/26/2018      [facility]       PRIMARY OBSTETRITIAN AND REFERRING PROVIDER:  Dr. Mayda Dior MD, [facility] [place] [address].      REASON FOR VISIT:   1.  Followup of pregnancy-induced hypertension following delivery 7 weeks ago.      HISTORY OF PRESENT ILLNESS:    Klely is well known to me from previous visits.  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  Her secondary hypertension workup had been negative.      Kelly's pregnancy has been complicated by hypertension that has been somewhat difficult to treat, and we had to maintain her on high-dose labetalol, nifedipine and alpha methyldopa.  She underwent elective induction which did not work and therefore progressed to  section at 37 weeks of gestation.  Just prior to that, her blood pressure had become very markedly elevated.  She delivered a healthy male child, and I was delighted to meet him today.  His name is Han, he is 7 weeks old and is healthy and beautiful.      Following delivery, as expected, Kelly has had a progressive decline in blood pressure and has been able to come off of labetalol and the alpha methyldopa.  She is on nifedipine ER 90 mg in the morning and 30 mg in the evening, and with this her home blood pressure logs have been in the 120-130/70-80s range.  Today her blood pressure was elevated at 142/96 mmHg with a pulse of 74 BPM (rechecked).  Fortunately, she is completely asymptomatic.  She has not been able to nurse.      CURRENT MEDICATIONS:  Nifedipine ER, Adalat CC 90 mg in the morning and 30 mg in the evening.      PHYSICAL EXAMINATION   VITAL SIGNS: /96 (BP Location: Left arm, Patient Position: Chair,  "Cuff Size: Adult Regular)  Pulse 74  Ht 1.626 m (5' 4\")  Wt 77.6 kg (171 lb)  LMP 2017  SpO2 98%  BMI 29.35 kg/m2.     DIAGNOSES:   1.  Pregnancy-induced hypertension in the first trimester.      I am very glad that James had a successful  delivery and has a healthy baby.  Her blood pressure continues to come down.  It is a little elevated today, but I do not want to make any changes.  My hope is that at 3-6 months postpartum she will be able to come off nifedipine completely.  Of course, there is the possibility that she had true essential hypertension even prior to the pregnancy and may need low-dose medication.      I am going to give her a break from clinic followups.  I will see her back in 6 months to revisit the issue of hypertension.      It was my pleasure to visit with James and her wonderful baby.  I look forward to seeing her again.        cc:   Mayda Dior MD    [facility]    [address] [place] [address] TRES SPRINGER MD             D: 10/26/2018   T: 10/26/2018   MT: KOREY      Name:     JAMES PASTRANA   MRN:      [id number]        Account:      [id number]   :      1980           Service Date: 10/26/2018      Document: [id number]      "

## 2018-10-26 NOTE — PATIENT INSTRUCTIONS
1. Gradually attempt to wean off Nifedipine after 3 months, under the guidance of Dr. Dior.    2. Follow up with me in 6 months.    If you have any questions or concerns, please contact my nurses at [phone].

## 2018-10-26 NOTE — PROGRESS NOTES
Clinic visit note dictated. Dictation reference number - [id number]          REVIEW OF SYSTEMS:  A comprehensive 10-point review of systems was completed and the pertinent positives are documented in the history of present illness.    Skin:  Negative     Eyes:  Positive for contacts  ENT:  Negative    Respiratory:  Negative    Cardiovascular:  Negative    Gastroenterology: Negative    Genitourinary:  Negative    Musculoskeletal:  Negative    Neurologic:  Negative    Psychiatric:  Negative    Heme/Lymph/Imm:  Negative    Endocrine:  Negative      CURRENT MEDICATIONS:  Current Outpatient Prescriptions   Medication Sig Dispense Refill     Acetaminophen (TYLENOL PO) Take by mouth as needed for mild pain or fever       albuterol (PROAIR HFA) 108 (90 BASE) MCG/ACT Inhaler Inhale 2 puffs into the lungs every 6 hours as needed for shortness of breath / dyspnea Due for office visit: [phone] 1 Inhaler 1     ASTHMA CONTROLLER MEDICATION NOT PRESCRIBED, INTENTIONAL, Asthma controller medication not prescribed intentionally due to Other:not needed 0 each 0     Cetirizine-Pseudoephedrine (ZYRTEC-D PO) Take by mouth daily        fluticasone (FLONASE) 50 MCG/ACT nasal spray Spray 1-2 sprays into both nostrils daily 3 Package 3     NIFEdipine ER (ADALAT CC) 90 MG TB24 Take 1 tablet (90 mg) by mouth daily Take 60 mg in the morning and 30 mg in the evening. 100 tablet 3     Prenatal Vit-Fe Fumarate-FA (PRENATAL MULTIVITAMIN PLUS IRON) 27-0.8 MG TABS per tablet Take 2 tablets by mouth 2 times daily       Probiotic Product (PROBIOTIC PO)            ALLERGIES:  Allergies   Allergen Reactions     Amoxicillin Rash     Animal Dander      Diclofenac Nausea and Vomiting     Keflex [Cephalexin Monohydrate] Rash     Seasonal Allergies      Sulfa Drugs Nausea and Vomiting       PAST MEDICAL HISTORY:    Past Medical History:   Diagnosis Date     Abdominal pain 9/12    elev lft's, then ruq us fatty liver, endoscopic us done 10/12 and dilated  ducts.     Arthritis      Cervical dysplasia      Condyloma acuminatum      Elevated liver enzymes     felt due to fatty liver     Fatty liver      Fibroids      Gastro-oesophageal reflux disease      H/O cold sores      HPV in female      Hx of previous reproductive problem     IVF     Hypertension     during pregnancy     Intermittent asthma     mostly with allergies     Joint pain     Dr. Dickson     Migraine      Migraines      Pancreatic disease      Pelvic pain in female      PONV (postoperative nausea and vomiting)      Seasonal allergies      Sphincter of Oddi dysfunction     Dr. Ras Juárez, had ercp, eus, panc stents placed     Spondyloarthropathy (H)      Urinary frequency        PAST SURGICAL HISTORY:    Past Surgical History:   Procedure Laterality Date      SECTION N/A 2018    Procedure:  SECTION;;  Surgeon: Crista Nicole MD;  Location:  L+D     ENDOSCOPIC RETROGRADE CHOLANGIOPANCREATOGRAM  6/10/2014    Procedure: ENDOSCOPIC RETROGRADE CHOLANGIOPANCREATOGRAM;  Surgeon: Ras Juárez MD;  Location:  OR     ENDOSCOPIC RETROGRADE CHOLANGIOPANCREATOGRAPHY       ESOPHAGOSCOPY, GASTROSCOPY, DUODENOSCOPY (EGD), COMBINED  2014    Procedure: COMBINED ESOPHAGOSCOPY, GASTROSCOPY, DUODENOSCOPY (EGD), REMOVE FOREIGN BODY;  Surgeon: Gabbie Menjivar MD;  Location:  GI     GYN SURGERY      LEEP     HC UGI ENDOSCOPY W EUS N/A 2014    Procedure: COMBINED ENDOSCOPIC ULTRASOUND, ESOPHAGOSCOPY, GASTROSCOPY, DUODENOSCOPY (EGD);  Surgeon: Gabbie Menjivar MD;  Location: [facility]     LAPAROSCOPIC CHOLECYSTECTOMY       LAPAROSCOPIC MYOMECTOMY UTERUS N/A 10/30/2015    Procedure: LAPAROSCOPIC MYOMECTOMY UTERUS;  Surgeon: Mayda Dior MD;  Location: [facility]       FAMILY HISTORY:    Family History   Problem Relation Age of Onset     Unknown/Adopted Father        SOCIAL HISTORY:    Social History     Social History     Marital status:  "     Spouse name: Janet     Number of children: 0     Years of education: N/A     Occupational History      [employer]     Social History Main Topics     Smoking status: Never Smoker     Smokeless tobacco: Never Used     Alcohol use 1.8 oz/week     3 Standard drinks or equivalent per week      Comment: occ     Drug use: No     Sexual activity: Yes     Partners: Male     Other Topics Concern     None     Social History Narrative       PHYSICAL EXAM:    Vitals: BP (!) 142/96 (BP Location: Left arm, Patient Position: Chair, Cuff Size: Adult Regular)  Pulse 74  Ht 1.626 m (5' 4\")  Wt 77.6 kg (171 lb)  LMP 11/19/2017  SpO2 98%  BMI 29.35 kg/m2  Wt Readings from Last 5 Encounters:   10/26/18 77.6 kg (171 lb)   09/08/18 79.1 kg (174 lb 6.4 oz)   08/23/18 79.3 kg (174 lb 12.8 oz)   08/02/18 77.6 kg (171 lb)   07/11/18 77.9 kg (171 lb 12.8 oz)                   "

## 2018-10-26 NOTE — LETTER
10/26/2018    Mayda Dior MD  [facility] [address]  [facility] [address]    RE: Kelly Wright       Dear Colleague,    I had the pleasure of seeing Kelly Wright in the [facility].    Clinic visit note dictated. Dictation reference number - [id number]          REVIEW OF SYSTEMS:  A comprehensive 10-point review of systems was completed and the pertinent positives are documented in the history of present illness.    Skin:  Negative     Eyes:  Positive for contacts  ENT:  Negative    Respiratory:  Negative    Cardiovascular:  Negative    Gastroenterology: Negative    Genitourinary:  Negative    Musculoskeletal:  Negative    Neurologic:  Negative    Psychiatric:  Negative    Heme/Lymph/Imm:  Negative    Endocrine:  Negative      CURRENT MEDICATIONS:  Current Outpatient Prescriptions   Medication Sig Dispense Refill     Acetaminophen (TYLENOL PO) Take by mouth as needed for mild pain or fever       albuterol (PROAIR HFA) 108 (90 BASE) MCG/ACT Inhaler Inhale 2 puffs into the lungs every 6 hours as needed for shortness of breath / dyspnea Due for office visit: [phone] 1 Inhaler 1     ASTHMA CONTROLLER MEDICATION NOT PRESCRIBED, INTENTIONAL, Asthma controller medication not prescribed intentionally due to Other:not needed 0 each 0     Cetirizine-Pseudoephedrine (ZYRTEC-D PO) Take by mouth daily        fluticasone (FLONASE) 50 MCG/ACT nasal spray Spray 1-2 sprays into both nostrils daily 3 Package 3     NIFEdipine ER (ADALAT CC) 90 MG TB24 Take 1 tablet (90 mg) by mouth daily Take 60 mg in the morning and 30 mg in the evening. 100 tablet 3     Prenatal Vit-Fe Fumarate-FA (PRENATAL MULTIVITAMIN PLUS IRON) 27-0.8 MG TABS per tablet Take 2 tablets by mouth 2 times daily       Probiotic Product (PROBIOTIC PO)            ALLERGIES:  Allergies   Allergen Reactions     Amoxicillin Rash     Animal Dander      Diclofenac Nausea and Vomiting     Keflex [Cephalexin  Monohydrate] Rash     Seasonal Allergies      Sulfa Drugs Nausea and Vomiting       PAST MEDICAL HISTORY:    Past Medical History:   Diagnosis Date     Abdominal pain     elev lft's, then ruq us fatty liver, endoscopic us done 10/12 and dilated ducts.     Arthritis      Cervical dysplasia      Condyloma acuminatum      Elevated liver enzymes     felt due to fatty liver     Fatty liver      Fibroids      Gastro-oesophageal reflux disease      H/O cold sores      HPV in female      Hx of previous reproductive problem     IVF     Hypertension     during pregnancy     Intermittent asthma     mostly with allergies     Joint pain     Dr. Dickson     Migraine      Migraines      Pancreatic disease      Pelvic pain in female      PONV (postoperative nausea and vomiting)      Seasonal allergies      Sphincter of Oddi dysfunction     Dr. Ras Juárez, had ercp, eus, panc stents placed     Spondyloarthropathy (H)      Urinary frequency        PAST SURGICAL HISTORY:    Past Surgical History:   Procedure Laterality Date      SECTION N/A 2018    Procedure:  SECTION;;  Surgeon: Crista Nicole MD;  Location:  L+D     ENDOSCOPIC RETROGRADE CHOLANGIOPANCREATOGRAM  6/10/2014    Procedure: ENDOSCOPIC RETROGRADE CHOLANGIOPANCREATOGRAM;  Surgeon: Ras Juárez MD;  Location:  OR     ENDOSCOPIC RETROGRADE CHOLANGIOPANCREATOGRAPHY       ESOPHAGOSCOPY, GASTROSCOPY, DUODENOSCOPY (EGD), COMBINED  2014    Procedure: COMBINED ESOPHAGOSCOPY, GASTROSCOPY, DUODENOSCOPY (EGD), REMOVE FOREIGN BODY;  Surgeon: Gabbie Menjivar MD;  Location:  GI     GYN SURGERY      LEEP     HC UGI ENDOSCOPY W EUS N/A 2014    Procedure: COMBINED ENDOSCOPIC ULTRASOUND, ESOPHAGOSCOPY, GASTROSCOPY, DUODENOSCOPY (EGD);  Surgeon: Gabbie Menjivar MD;  Location: [facility]     LAPAROSCOPIC CHOLECYSTECTOMY       LAPAROSCOPIC MYOMECTOMY UTERUS N/A 10/30/2015    Procedure: LAPAROSCOPIC  "MYOMECTOMY UTERUS;  Surgeon: Mayda Dior MD;  Location:  OR       FAMILY HISTORY:    Family History   Problem Relation Age of Onset     Unknown/Adopted Father        SOCIAL HISTORY:    Social History     Social History     Marital status:      Spouse name: Janet     Number of children: 0     Years of education: N/A     Occupational History      [employer]     Social History Main Topics     Smoking status: Never Smoker     Smokeless tobacco: Never Used     Alcohol use 1.8 oz/week     3 Standard drinks or equivalent per week      Comment: occ     Drug use: No     Sexual activity: Yes     Partners: Male     Other Topics Concern     None     Social History Narrative       PHYSICAL EXAM:    Vitals: BP (!) 142/96 (BP Location: Left arm, Patient Position: Chair, Cuff Size: Adult Regular)  Pulse 74  Ht 1.626 m (5' 4\")  Wt 77.6 kg (171 lb)  LMP 11/19/2017  SpO2 98%  BMI 29.35 kg/m2  Wt Readings from Last 5 Encounters:   10/26/18 77.6 kg (171 lb)   09/08/18 79.1 kg (174 lb 6.4 oz)   08/23/18 79.3 kg (174 lb 12.8 oz)   08/02/18 77.6 kg (171 lb)   07/11/18 77.9 kg (171 lb 12.8 oz)                     Thank you for allowing me to participate in the care of your patient.      Sincerely,     Tom Knight MD     [facility]    cc:   Mayda Dior MD  [facility]  [address] [address]        "

## 2019-09-28 ENCOUNTER — HEALTH MAINTENANCE LETTER (OUTPATIENT)
Age: 39
End: 2019-09-28

## 2019-10-01 ENCOUNTER — TRANSFERRED RECORDS (OUTPATIENT)
Dept: HEALTH INFORMATION MANAGEMENT | Facility: CLINIC | Age: 39
End: 2019-10-01

## 2019-11-06 ENCOUNTER — DOCUMENTATION ONLY (OUTPATIENT)
Dept: CARDIOLOGY | Facility: CLINIC | Age: 39
End: 2019-11-06

## 2019-11-06 NOTE — LETTER
November 6, 2019       TO: Kelly Wright  [address]  [facility] [address]       Dear Kelly Wright,    We are reviewing our outstanding orders.    Dr. Knight has ordered an office visit and lab work for your 6 month follow up. Your last visit was in October, 2018. Dr. Knight has some openings in January and the February schedule opens soon.     Please contact the scheduling desk at [phone] to arrange for an appointment.     If you have any questions, please call the Team 2 nurse phone @ [phone]. If you had your lab work done at another facility outside of the [facility] system, please give us a call so we can locate the results.     Thank you  Team 2 nurses

## 2019-12-16 ENCOUNTER — OFFICE VISIT (OUTPATIENT)
Dept: FAMILY MEDICINE | Facility: CLINIC | Age: 39
End: 2019-12-16
Payer: COMMERCIAL

## 2019-12-16 ENCOUNTER — TELEPHONE (OUTPATIENT)
Dept: FAMILY MEDICINE | Facility: CLINIC | Age: 39
End: 2019-12-16

## 2019-12-16 VITALS
RESPIRATION RATE: 16 BRPM | TEMPERATURE: 98 F | HEIGHT: 64 IN | HEART RATE: 82 BPM | BODY MASS INDEX: 27.14 KG/M2 | SYSTOLIC BLOOD PRESSURE: 134 MMHG | DIASTOLIC BLOOD PRESSURE: 92 MMHG | OXYGEN SATURATION: 99 % | WEIGHT: 159 LBS

## 2019-12-16 DIAGNOSIS — J45.21 MILD INTERMITTENT ASTHMA WITH ACUTE EXACERBATION: Primary | ICD-10-CM

## 2019-12-16 PROBLEM — O14.90 PRE-ECLAMPSIA: Status: RESOLVED | Noted: 2018-09-04 | Resolved: 2019-12-16

## 2019-12-16 PROBLEM — Z98.891 S/P CESAREAN SECTION: Status: RESOLVED | Noted: 2018-09-05 | Resolved: 2019-12-16

## 2019-12-16 PROCEDURE — 99214 OFFICE O/P EST MOD 30 MIN: CPT | Performed by: PHYSICIAN ASSISTANT

## 2019-12-16 RX ORDER — PREDNISONE 20 MG/1
40 TABLET ORAL DAILY
Qty: 10 TABLET | Refills: 0 | Status: SHIPPED | OUTPATIENT
Start: 2019-12-16 | End: 2019-12-21

## 2019-12-16 RX ORDER — MONTELUKAST SODIUM 10 MG/1
TABLET ORAL
Refills: 5 | COMMUNITY
Start: 2019-10-01 | End: 2022-10-11

## 2019-12-16 ASSESSMENT — MIFFLIN-ST. JEOR: SCORE: 1381.22

## 2019-12-16 NOTE — TELEPHONE ENCOUNTER
Call from pt:    Non-emergency situation.     Pt full sentences not SOB on phone. Tightness of chest. No trouble breathing. 3x albuterol rescue inhaler since morning. Would like to be seen in case she needs extra medication due to air quality at this time. Will see allergist tomorrow.     Checked with Karen Serna. Booked appt for 1430 this afternoon

## 2019-12-16 NOTE — LETTER
My Asthma Action Plan    Name: Kelly Wright   YOB: 1980  Date: 12/16/2019   My doctor: Юлия Serna PA-C   My clinic: [facility]        My Rescue Medicine:   Albuterol inhaler (Proair/Ventolin/Proventil HFA)  2-4 puffs EVERY 4 HOURS as needed. Use a spacer if recommended by your provider.   My Asthma Severity:   Intermittent / Exercise Induced  Know your asthma triggers: pollens             GREEN ZONE   Good Control    I feel good    No cough or wheeze    Can work, sleep and play without asthma symptoms       Take your asthma control medicine every day.     1. If exercise triggers your asthma, take your rescue medication    15 minutes before exercise or sports, and    During exercise if you have asthma symptoms  2. Spacer to use with inhaler: If you have a spacer, make sure to use it with your inhaler             YELLOW ZONE Getting Worse  I have ANY of these:    I do not feel good    Cough or wheeze    Chest feels tight    Wake up at night   1. Keep taking your Green Zone medications  2. Start taking your rescue medicine:    every 20 minutes for up to 1 hour. Then every 4 hours for 24-48 hours.  3. If you stay in the Yellow Zone for more than 12-24 hours, contact your doctor.  4. If you do not return to the Green Zone in 12-24 hours or you get worse, start taking your oral steroid medicine if prescribed by your provider.           RED ZONE Medical Alert - Get Help  I have ANY of these:    I feel awful    Medicine is not helping    Breathing getting harder    Trouble walking or talking    Nose opens wide to breathe       1. Take your rescue medicine NOW  2. If your provider has prescribed an oral steroid medicine, start taking it NOW  3. Call your doctor NOW  4. If you are still in the Red Zone after 20 minutes and you have not reached your doctor:    Take your rescue medicine again and    Call 911 or go to the emergency room right away    See your  regular doctor within 2 weeks of an Emergency Room or Urgent Care visit for follow-up treatment.          Annual Reminders:  Meet with Asthma Educator,  Flu Shot in the Fall, consider Pneumonia Vaccination for patients with asthma (aged 19 and older).    Pharmacy[facility]    [id number] - [place], [place] - [address] AT [address] [place], [place] [id number] - [place] - [address] AT [address]    Electronically signed by Юлия Serna PA-C   Date: 12/16/19                    Asthma Triggers  How To Control Things That Make Your Asthma Worse    Triggers are things that make your asthma worse.  Look at the list below to help you find your triggers and   what you can do about them. You can help prevent asthma flare-ups by staying away from your triggers.      Trigger                                                          What you can do   Cigarette Smoke  Tobacco smoke can make asthma worse. Do not allow smoking in your home, car or around you.  Be sure no one smokes at a child s day care or school.  If you smoke, ask your health care provider for ways to help you quit.  Ask family members to quit too.  Ask your health care provider for a referral to Quit Plan to help you quit smoking, or call 3-041-682-PLAN.     Colds, Flu, Bronchitis  These are common triggers of asthma. Wash your hands often.  Don t touch your eyes, nose or mouth.  Get a flu shot every year.     Dust Mites  These are tiny bugs that live in cloth or carpet. They are too small to see. Wash sheets and blankets in hot water every week.   Encase pillows and mattress in dust mite proof covers.  Avoid having carpet if you can. If you have carpet, vacuum weekly.   Use a dust mask and HEPA vacuum.   Pollen and Outdoor Mold  Some people are allergic to trees, grass, or weed pollen, or molds. Try to keep your windows closed.  Limit time out doors when pollen count is high.   Ask  you health care provider about taking medicine during allergy season.     Animal Dander  Some people are allergic to skin flakes, urine or saliva from pets with fur or feathers. Keep pets with fur or feathers out of your home.    If you can t keep the pet outdoors, then keep the pet out of your bedroom.  Keep the bedroom door closed.  Keep pets off cloth furniture and away from stuffed toys.     Mice, Rats, and Cockroaches  Some people are allergic to the waste from these pests.   Cover food and garbage.  Clean up spills and food crumbs.  Store grease in the refrigerator.   Keep food out of the bedroom.   Indoor Mold  This can be a trigger if your home has high moisture. Fix leaking faucets, pipes, or other sources of water.   Clean moldy surfaces.  Dehumidify basement if it is damp and smelly.   Smoke, Strong Odors, and Sprays  These can reduce air quality. Stay away from strong odors and sprays, such as perfume, powder, hair spray, paints, smoke incense, paint, cleaning products, candles and new carpet.   Exercise or Sports  Some people with asthma have this trigger. Be active!  Ask your doctor about taking medicine before sports or exercise to prevent symptoms.    Warm up for 5-10 minutes before and after sports or exercise.     Other Triggers of Asthma  Cold air:  Cover your nose and mouth with a scarf.  Sometimes laughing or crying can be a trigger.  Some medicines and food can trigger asthma.

## 2019-12-16 NOTE — PROGRESS NOTES
Subjective     Kelly Wright is a 39 year old female who presents to clinic today for the following health issues:    HPI   Asthma Follow-Up    -patient states that she has been having tightness in her chest, albuterol did not help patient enough  -feels lightheaded    Was ACT completed today?    Yes    ACT Total Scores 12/16/2019   ACT TOTAL SCORE -   ASTHMA ER VISITS -   ASTHMA HOSPITALIZATIONS -   ACT TOTAL SCORE (Goal Greater than or Equal to 20) 15   In the past 12 months, how many times did you visit the emergency room for your asthma without being admitted to the hospital? 0   In the past 12 months, how many times were you hospitalized overnight because of your asthma? 0       How many days per week do you miss taking your asthma controller medication?  Takes qvar PRN an has been using this more recently    Please describe any recent triggers for your asthma: pollens, mold, humidity and cold air URI    Have you had any Emergency Room Visits, Urgent Care Visits, or Hospital Admissions since your last office visit?  No      How many servings of fruits and vegetables do you eat daily?  2-3    On average, how many sweetened beverages do you drink each day (Examples: soda, juice, sweet tea, etc.  Do NOT count diet or artificially sweetened beverages)?   0    How many days per week do you miss taking your medication? 0    Recent Labs   Lab Test 10/23/18  1520 09/08/18  0720 09/07/18  0852  09/05/18  1940  06/28/18  1428   ALT  --  23 20  --  15   < > 20   CR 1.09 0.90 0.87  --  0.76   < > 0.71   GFRESTIMATED 56* 70 73  --  86   < > >90   GFRESTBLACK 68 85 88  --  >90   < > >90   POTASSIUM 3.7 3.5 3.6   < > 3.2*  --  3.7   TSH  --   --   --   --   --   --  1.42    < > = values in this interval not displayed.      BP Readings from Last 3 Encounters:   12/16/19 (!) 134/92   10/26/18 (!) 142/96   09/08/18 (!) 151/97    Wt Readings from Last 3 Encounters:   12/16/19 72.1 kg (159 lb)   10/26/18 77.6 kg (171 lb)  "  09/08/18 79.1 kg (174 lb 6.4 oz)         Reviewed and updated as needed this visit by Provider  Tobacco  Allergies  Meds  Problems  Med Hx  Surg Hx  Fam Hx  Soc Hx          Additional complaints: None    HPI additional notes: Kelly presents today with   Chief Complaint   Patient presents with     Asthma   Cough with the cold air, no significant wheezing.  Used albuterol three times today, last used at noon.           Review of Systems   C: Negative for fever, chills, recent change in weight  Skin: Negative for worrisome rashes or lesions  ENT: Negative for ear, mouth and throat problems  Resp: POSITIVE for cough non-productive with SOB and wheezing  MS: Negative for significant arthralgias or myalgias  NEURO: Negative  for headaches or dizziness.  P: Negative for changes in mood or affect  ROS otherwise negative.        Objective    BP (!) 134/92   Pulse 82   Temp 98  F (36.7  C) (Tympanic)   Resp 16   Ht 1.626 m (5' 4\")   Wt 72.1 kg (159 lb)   LMP 12/16/2019 (Exact Date)   SpO2 99%   Breastfeeding No   BMI 27.29 kg/m    Body mass index is 27.29 kg/m .  Physical Exam   GENERAL: healthy, alert, in no acute distress  EYES: Grossly normal to inspection, EOMI, PERRL  HENT: Ear canals normal bilaterally. TM pearly gray without effusion bilaterally.  Nasal mucosa mildly edematous with clear rhinorrhea.  No septal deviation.  Mouth- mucous membranes moist, no lesions or ulcerations. Pharynx pink. and No tonsillary hypertrophy. Uvula midline, clear post-nasal drainage.  Maxillary and frontal sinuses nontender to palpation bilaterally  NECK: Non-tender, no adenopathy.  RESP: lungs clear to auscultation - no rales, rhonchi or wheezes, prolonged expiratory phase and cough with deep inspiration   CV: regular rate and rhythm, normal S1 S2.  No peripheral edema.  SKIN: no suspicious lesions, no rashes  PSYCH: Alert and oriented times 3;  Able to articulate logical thoughts. Affect is normal.    Diagnostic " test results: none         Assessment & Plan       ICD-10-CM    1. Mild intermittent asthma with acute exacerbation J45.21 predniSONE (DELTASONE) 20 MG tablet       Please see patient instructions for treatment details.    Return in about 2 days (around 12/18/2019) for Specialist Appt as referred.    Юлия Serna PA-C  [facility]

## 2019-12-17 ASSESSMENT — ASTHMA QUESTIONNAIRES: ACT_TOTALSCORE: 15

## 2019-12-18 DIAGNOSIS — J45.21 MILD INTERMITTENT ASTHMA WITH ACUTE EXACERBATION: ICD-10-CM

## 2019-12-18 RX ORDER — PREDNISONE 20 MG/1
TABLET ORAL
Qty: 10 TABLET | Refills: 0 | OUTPATIENT
Start: 2019-12-18

## 2019-12-18 NOTE — TELEPHONE ENCOUNTER
predniSONE (DELTASONE) 20 MG tablet      Last Written Prescription Date:  12/16/2019  Last Fill Quantity: 10 tablet,  # refills: 0   Last office visit: 12/16/2019 with prescribing provider:  Daphne Mckeon Office Visit:        Routing refill request to provider for review/approval because:  Drug not on the [facility], [facility] or [facility] refill protocol or controlled substance

## 2020-01-21 ENCOUNTER — TRANSFERRED RECORDS (OUTPATIENT)
Dept: HEALTH INFORMATION MANAGEMENT | Facility: CLINIC | Age: 40
End: 2020-01-21

## 2020-02-11 ENCOUNTER — DOCUMENTATION ONLY (OUTPATIENT)
Dept: CARDIOLOGY | Facility: CLINIC | Age: 40
End: 2020-02-11

## 2020-02-11 NOTE — LETTER
February 11, 2020       TO: Kelly Wright  [address]  [facility] [address]       Dear Kelly Wright,    We are reviewing our overdue orders and see that you are due for an office visit with your cardiologist, as well as for lab work.     Our May 2020 schedule will be open soon. We recommend calling the week of 2/17/20, as appointments tend to fill quickly.     Please call our clinic at [phone] to schedule your appointment.    Thank you,    [facility]  Team 2 Nurses  [phone]

## 2020-03-15 ENCOUNTER — HEALTH MAINTENANCE LETTER (OUTPATIENT)
Age: 40
End: 2020-03-15

## 2020-09-21 ENCOUNTER — TRANSFERRED RECORDS (OUTPATIENT)
Dept: HEALTH INFORMATION MANAGEMENT | Facility: CLINIC | Age: 40
End: 2020-09-21

## 2020-10-12 ENCOUNTER — TRANSFERRED RECORDS (OUTPATIENT)
Dept: HEALTH INFORMATION MANAGEMENT | Facility: CLINIC | Age: 40
End: 2020-10-12

## 2020-10-12 LAB
ALT SERPL-CCNC: 26 IU/L (ref 5–35)
AST SERPL-CCNC: 22 U/L (ref 5–34)
CREAT SERPL-MCNC: 0.67 MG/DL (ref 0.5–1.3)
GFR SERPL CREATININE-BSD FRML MDRD: 103.6 ML/MIN/1.73M2

## 2020-10-15 ENCOUNTER — TELEPHONE (OUTPATIENT)
Dept: FAMILY MEDICINE | Facility: CLINIC | Age: 40
End: 2020-10-15

## 2020-10-15 NOTE — TELEPHONE ENCOUNTER
Patient Quality Outreach      Summary:    Patient is due/failing the following:   ACT needed    Type of outreach:    Sent letter.    Questions for provider review:    None                                                                                   **Start Working phrase here:**       Patient has the following on her problem list/HM:     Asthma review       ACT Total Scores 12/16/2019   ACT TOTAL SCORE -   ASTHMA ER VISITS -   ASTHMA HOSPITALIZATIONS -   ACT TOTAL SCORE (Goal Greater than or Equal to 20) 15   In the past 12 months, how many times did you visit the emergency room for your asthma without being admitted to the hospital? 0   In the past 12 months, how many times were you hospitalized overnight because of your asthma? 0

## 2020-10-15 NOTE — TELEPHONE ENCOUNTER
----- Message from Юлия Serna PA-C sent at 10/13/2020  2:03 PM CDT -----  Please contact pt.  They are due for ACT.

## 2020-10-15 NOTE — LETTER
October 15, 2020    Kelly Wright  [address]  [facility] [address]    Dear Brook Mendoza cares about your health and your health plan.  I have reviewed your medical conditions, medication list and lab results, and am making recommendations based on this review to better manage your health.    You are in particular need of attention regarding:  -Asthma    I am recommending that you:     -Complete and return the attached ASTHMA CONTROL TEST.  If your total score is 19 or less or you have been to the ER or urgent care for your asthma, then please schedule an asthma followup appointment.      Please call us at the [facility] location:  [phone] or use "[employer] to address the above recommendations.     Thank you for trusting [facility]  We appreciate the opportunity to serve you and look forward to supporting your healthcare in the future.    If you have (or plan to have) any of these tests done at a facility other than a [facility] or a [facility], please have the results sent to the [facility] location noted above.      Best Regards,    SHEA Castro

## 2020-11-02 ENCOUNTER — TELEPHONE (OUTPATIENT)
Dept: FAMILY MEDICINE | Facility: CLINIC | Age: 40
End: 2020-11-02

## 2020-11-02 NOTE — TELEPHONE ENCOUNTER
Patient Quality Outreach      Summary:    Patient is due/failing the following:   ACT needed, Cervical Cancer Screening - PAP Needed and Annual wellness, date due: now    Type of outreach:    Phone, left message for patient/parent to call back. and Sent letter.    Questions for provider review:    None                                                                                   **Start Working phrase here:**       Patient has the following on her problem list/HM:     Asthma review       ACT Total Scores 12/16/2019   ACT TOTAL SCORE -   ASTHMA ER VISITS -   ASTHMA HOSPITALIZATIONS -   ACT TOTAL SCORE (Goal Greater than or Equal to 20) 15   In the past 12 months, how many times did you visit the emergency room for your asthma without being admitted to the hospital? 0   In the past 12 months, how many times were you hospitalized overnight because of your asthma? 0

## 2020-11-02 NOTE — TELEPHONE ENCOUNTER
----- Message from Юлия Serna PA-C sent at 11/2/2020  8:12 AM CST -----  Please contact pt.  They are due for ACT.

## 2020-11-02 NOTE — LETTER
November 2, 2020    Kelly Wright  [address] [facility] [address]    Dear Brook Mendoza cares about your health and your health plan.  I have reviewed your medical conditions, medication list and lab results, and am making recommendations based on this review to better manage your health.    You are in particular need of attention regarding:  -Asthma    I am recommending that you:     -Complete and return the attached ASTHMA CONTROL TEST.  If your total score is 19 or less or you have been to the ER or urgent care for your asthma, then please schedule an asthma followup appointment.      Please call us at the [facility] location:  [phone] or use [employer] to address the above recommendations.     Thank you for trusting [facility].  We appreciate the opportunity to serve you and look forward to supporting your healthcare in the future.    If you have (or plan to have) any of these tests done at a facility other than a [facility] or a [facility], please have the results sent to the [facility] location noted above.      Best Regards,    SHEA Castro

## 2020-12-09 ENCOUNTER — TELEPHONE (OUTPATIENT)
Dept: FAMILY MEDICINE | Facility: CLINIC | Age: 40
End: 2020-12-09

## 2020-12-09 NOTE — TELEPHONE ENCOUNTER
Spoke with patient and asked her to log into her mychart to complete the ACT. Will postpone encounter for one week to see if she read the message. Margret Narvaez

## 2020-12-09 NOTE — TELEPHONE ENCOUNTER
----- Message from Юлия Serna PA-C sent at 12/9/2020  9:02 AM CST -----  Please contact pt.  They are due for ACT.

## 2020-12-09 NOTE — LETTER
December 16, 2020      Kelly Wright  [address]  [facility] [address]        Dear Kelly,     Our records show you are due to update your Asthma Control Test Questionnaire, I have attached this to this message. Please complete and submit back for review.      This screening tool helps us to assess how well your asthma is controlled. Good asthma control leads to less asthma symptoms and greater health. If your asthma is not in good control (score less than 20) it is recommended you be seen by your provider for medication and lifestyle adjustments.     Please fill out the sheet and mail back to the clinic or log onto your [employer] and send us the back the answer in a message.     Please respond below with your answers for each question.                        Question #1     Question #2     Question #3     Question #4     Question #5     TOTAL     # of Emergency Room visits related to asthma in the last 12 months     # of hospitalizations related  to asthma in the last 12 months         Thank you for your time.      Юлия Serna PA-C

## 2021-01-09 ENCOUNTER — HEALTH MAINTENANCE LETTER (OUTPATIENT)
Age: 41
End: 2021-01-09

## 2021-02-18 DIAGNOSIS — I10 ESSENTIAL HYPERTENSION: Primary | ICD-10-CM

## 2021-04-09 ENCOUNTER — TRANSFERRED RECORDS (OUTPATIENT)
Dept: HEALTH INFORMATION MANAGEMENT | Facility: CLINIC | Age: 41
End: 2021-04-09

## 2021-08-19 ENCOUNTER — TRANSFERRED RECORDS (OUTPATIENT)
Dept: HEALTH INFORMATION MANAGEMENT | Facility: CLINIC | Age: 41
End: 2021-08-19

## 2021-08-19 LAB
ALT SERPL-CCNC: 14 IU/L (ref 5–35)
AST SERPL-CCNC: 18 U/L (ref 5–34)
CREATININE (EXTERNAL): 0.74 MG/DL (ref 0.5–1.3)
GFR ESTIMATED (EXTERNAL): 91.9 ML/MIN/1.73M2

## 2021-10-23 ENCOUNTER — HEALTH MAINTENANCE LETTER (OUTPATIENT)
Age: 41
End: 2021-10-23

## 2022-02-12 ENCOUNTER — HEALTH MAINTENANCE LETTER (OUTPATIENT)
Age: 42
End: 2022-02-12

## 2022-03-03 ENCOUNTER — TRANSFERRED RECORDS (OUTPATIENT)
Dept: HEALTH INFORMATION MANAGEMENT | Facility: CLINIC | Age: 42
End: 2022-03-03
Payer: COMMERCIAL

## 2022-03-23 ENCOUNTER — TRANSFERRED RECORDS (OUTPATIENT)
Dept: HEALTH INFORMATION MANAGEMENT | Facility: CLINIC | Age: 42
End: 2022-03-23
Payer: COMMERCIAL

## 2022-03-23 LAB
HPV ABSTRACT: NORMAL
PAP-ABSTRACT: NORMAL

## 2022-03-29 ENCOUNTER — OFFICE VISIT (OUTPATIENT)
Dept: FAMILY MEDICINE | Facility: CLINIC | Age: 42
End: 2022-03-29
Payer: COMMERCIAL

## 2022-03-29 VITALS
TEMPERATURE: 97.3 F | SYSTOLIC BLOOD PRESSURE: 160 MMHG | WEIGHT: 167 LBS | OXYGEN SATURATION: 97 % | HEIGHT: 64 IN | HEART RATE: 73 BPM | DIASTOLIC BLOOD PRESSURE: 98 MMHG | RESPIRATION RATE: 16 BRPM | BODY MASS INDEX: 28.51 KG/M2

## 2022-03-29 DIAGNOSIS — H66.90 ACUTE OTITIS MEDIA, UNSPECIFIED OTITIS MEDIA TYPE: ICD-10-CM

## 2022-03-29 DIAGNOSIS — R03.0 TRANSIENT ELEVATED BLOOD PRESSURE: ICD-10-CM

## 2022-03-29 DIAGNOSIS — H61.22 IMPACTED CERUMEN OF LEFT EAR: Primary | ICD-10-CM

## 2022-03-29 PROCEDURE — 99213 OFFICE O/P EST LOW 20 MIN: CPT | Performed by: INTERNAL MEDICINE

## 2022-03-29 RX ORDER — ESCITALOPRAM OXALATE 20 MG/1
20 TABLET ORAL DAILY
COMMUNITY
Start: 2022-03-23 | End: 2022-10-11

## 2022-03-29 RX ORDER — GABAPENTIN 300 MG/1
CAPSULE ORAL
COMMUNITY
Start: 2022-03-09 | End: 2022-10-11

## 2022-03-29 RX ORDER — SULINDAC 200 MG/1
200 TABLET ORAL DAILY
COMMUNITY
Start: 2022-03-05

## 2022-03-29 ASSESSMENT — PAIN SCALES - GENERAL: PAINLEVEL: NO PAIN (0)

## 2022-03-29 ASSESSMENT — ASTHMA QUESTIONNAIRES: ACT_TOTALSCORE: 25

## 2022-03-29 NOTE — PROGRESS NOTES
"  Assessment & Plan   Problem List Items Addressed This Visit     None      Visit Diagnoses     Impacted cerumen of left ear    -  Primary    Acute otitis media, unspecified otitis media type        Transient elevated blood pressure               Patient denies any ear pain or ear drainage.  There is tympanic membrane redness and dullness patient denies any pain per se we will hold on antibiotics on antibiotics unless she is having ear symptoms.  She sounded congested although she denies being congested probable underlying allergies.  Earwax lavage was done in the left ear canal.  Advised patient to call us with blood pressure readings.  Blood pressure was elevated in the clinic.  Patient is asymptomatic.  She has history of gestational hypertension.  Risk of pregnancy-induced hypertension.  Advised patient to continue monitor blood pressure and call us with blood pressure readings.  Patient did not seem to be worried about that she stated her blood pressure at her dermatologist and dentist last week was 118/80.       BMI:   Estimated body mass index is 28.67 kg/m  as calculated from the following:    Height as of this encounter: 1.626 m (5' 4\").    Weight as of this encounter: 75.8 kg (167 lb).   Weight management plan: Discussed healthy diet and exercise guidelines    See Patient Instructions    Return in about 4 weeks (around 4/26/2022), or if symptoms worsen or fail to improve, for As needed and if symptoms worsen.    Debbie Olson MD  [facility] [place] is a 41 year old who presents for the following health issues   HPI   Patient presenting for evaluation of earwax in both ears decreased hearing in the left ear.  Feels some fullness in her left ear.  She denies having sore throat or nasal congestion although she sounded congested.  She attributes to allergies.    Review of Systems   Constitutional, HEENT, cardiovascular, pulmonary, gi and gu systems are negative, except " "as otherwise noted.      Objective    BP (!) 160/98   Pulse 73   Temp 97.3  F (36.3  C) (Temporal)   Resp 16   Ht 1.626 m (5' 4\")   Wt 75.8 kg (167 lb)   LMP 03/11/2022   SpO2 97%   Breastfeeding No   BMI 28.67 kg/m    Body mass index is 28.67 kg/m .  Physical Exam   GENERAL: healthy, alert and no distress  EYES: Eyes grossly normal to inspection, PERRL and conjunctivae and sclerae normal  HENT: ear canals wax in both ear canals removed with the , minimal wax left in the left ear canal there was wax impaction of the left ear canal.  Minimal wax in the right ear canal.  Left tympanic membrane is red and the superior aspect slightly dulled.  Patient denies any ear pain  Heart regular rate and rhythm    Transferred Records on 08/19/2021   Component Date Value Ref Range Status     ALT (External) 08/19/2021 14  5 - 35 IU/L Final     AST (External) 08/19/2021 18  5 - 34 U/L Final     Creatinine (External) 08/19/2021 0.740  0.500 - 1.300 mg/dL Final     GFR Estimated (External) 08/19/2021 91.9  mL/min/1.73m2 Final               "

## 2022-03-29 NOTE — NURSING NOTE
Left ear wash. 4 squirts using  plain warm water in elephant ear system but ear became extremely more red than already was at start.  Pt said did not hurt.  I was not comfortable washing any further. Pt understood.   The ear is 50% occluded.  Pt will return if becomes blocked.   Ginny ELY MA

## 2022-08-23 ENCOUNTER — TRANSFERRED RECORDS (OUTPATIENT)
Dept: FAMILY MEDICINE | Facility: CLINIC | Age: 42
End: 2022-08-23

## 2022-08-23 ENCOUNTER — TRANSFERRED RECORDS (OUTPATIENT)
Dept: HEALTH INFORMATION MANAGEMENT | Facility: CLINIC | Age: 42
End: 2022-08-23

## 2022-08-23 LAB
ALT SERPL-CCNC: 29 IU/L (ref 5–35)
AST SERPL-CCNC: 29 U/L (ref 5–34)
CREATININE (EXTERNAL): 0.95 MG/DL (ref 0.5–1.3)

## 2022-09-27 ENCOUNTER — TELEPHONE (OUTPATIENT)
Dept: CARDIOLOGY | Facility: CLINIC | Age: 42
End: 2022-09-27

## 2022-09-27 DIAGNOSIS — I10 ESSENTIAL HYPERTENSION: Primary | ICD-10-CM

## 2022-10-09 ENCOUNTER — HEALTH MAINTENANCE LETTER (OUTPATIENT)
Age: 42
End: 2022-10-09

## 2022-10-11 ENCOUNTER — OFFICE VISIT (OUTPATIENT)
Dept: CARDIOLOGY | Facility: CLINIC | Age: 42
End: 2022-10-11
Payer: COMMERCIAL

## 2022-10-11 VITALS
SYSTOLIC BLOOD PRESSURE: 146 MMHG | OXYGEN SATURATION: 97 % | HEIGHT: 65 IN | HEART RATE: 73 BPM | DIASTOLIC BLOOD PRESSURE: 98 MMHG | BODY MASS INDEX: 28.82 KG/M2 | WEIGHT: 173 LBS

## 2022-10-11 DIAGNOSIS — I10 UNCONTROLLED HYPERTENSION: Primary | ICD-10-CM

## 2022-10-11 PROCEDURE — 99204 OFFICE O/P NEW MOD 45 MIN: CPT | Performed by: INTERNAL MEDICINE

## 2022-10-11 PROCEDURE — 93000 ELECTROCARDIOGRAM COMPLETE: CPT | Performed by: INTERNAL MEDICINE

## 2022-10-11 RX ORDER — LOSARTAN POTASSIUM 25 MG/1
25 TABLET ORAL EVERY MORNING
Qty: 30 TABLET | Refills: 4 | Status: SHIPPED | OUTPATIENT
Start: 2022-10-11 | End: 2022-11-17

## 2022-10-11 RX ORDER — LABETALOL 200 MG/1
200 TABLET, FILM COATED ORAL 2 TIMES DAILY
COMMUNITY
End: 2023-02-02

## 2022-10-11 NOTE — PATIENT INSTRUCTIONS
1.  Start losartan 25 mg.  Take 1 tablet daily in the morning.  2.  At the same time, continue your labetalol 200 mg two times daily.  3.  In 2 weeks, if your blood pressure remains 140/80 or above, increase losartan to 50 mg daily in the morning.  In other words, take 2 of the 25 mg pills.  4.  In 4 weeks, nurse blood pressure visit and blood test.  Dr. Knight's office will contact you with recommendations.  5.  Follow-up with Dr. Knight with echocardiogram and kidney ultrasound  in 3 months.    If you have any questions or concerns, please contact my nurses at [phone].

## 2022-10-11 NOTE — LETTER
"10/11/2022    Mayda Dior MD  [address]  [place] [address]    RE: Kelly Shruthi Wright       Dear Colleague,     I had the pleasure of seeing Kelly Wright in the [facility].    Clinic visit note dictated. Dictation reference number -     REFERRING PROVIDER:  No referring provider defined for this encounter.    PRIMARY CARE PROVIDER:  Mayda Dior  [facility] [address]  [place] [address]        Today's clinic visit entailed:  {[facility] 2021 Documentation (Optional):732238}  {2021 E&M time:950988}  Provider  Link to [facility] Help Grid     {[facility] Level:892572}        Encounter Diagnosis   Name Primary?     Uncontrolled hypertension Yes         Orders Placed This Encounter   Procedures     US Renal Complete w Doppler Complete     Basic metabolic panel     Basic metabolic panel     Follow-Up with Cardiology Nurse     Follow-Up with Cardiology     Echocardiogram Complete         Vitals: BP (!) 146/98   Pulse 73   Ht 1.638 m (5' 4.5\")   Wt 78.5 kg (173 lb)   SpO2 97%   BMI 29.24 kg/m    Wt Readings from Last 5 Encounters:   10/11/22 78.5 kg (173 lb)   03/29/22 75.8 kg (167 lb)   12/16/19 72.1 kg (159 lb)   10/26/18 77.6 kg (171 lb)   09/08/18 79.1 kg (174 lb 6.4 oz)             CURRENT MEDICATIONS:  Current Outpatient Medications   Medication Sig Dispense Refill     Acetaminophen (TYLENOL PO) Take by mouth as needed for mild pain or fever       albuterol (PROAIR HFA) 108 (90 BASE) MCG/ACT Inhaler Inhale 2 puffs into the lungs every 6 hours as needed for shortness of breath / dyspnea Due for office visit: [phone] 1 Inhaler 1     Cetirizine-Pseudoephedrine (ZYRTEC-D PO) Take by mouth daily        fluticasone (FLONASE) 50 MCG/ACT nasal spray Spray 1-2 sprays into both nostrils daily 3 Package 3     labetalol (NORMODYNE) 200 MG tablet Take 1 tablet (200 mg) by mouth 2 times daily       losartan (COZAAR) 25 MG tablet Take 1 tablet (25 mg) by mouth every morning 30 " tablet 4     Probiotic Product (PROBIOTIC PO)        sulindac (CLINORIL) 200 MG tablet Take 200 mg by mouth daily            ALLERGIES:  Allergies   Allergen Reactions     Amoxicillin Rash     Diclofenac Nausea and Vomiting     Keflex [Cephalexin Monohydrate] Rash     Sulfa Drugs Nausea and Vomiting     Animal Dander      Seasonal Allergies        PAST MEDICAL HISTORY:    Past Medical History:   Diagnosis Date     Arthritis      Cervical dysplasia      Condyloma acuminatum      Elevated liver enzymes 2012    felt due to fatty liver     Fibroids      Gastro-oesophageal reflux disease      H/O cold sores      HPV in female      Hx of previous reproductive problem     IVF     Hypertension     during pregnancy     Intermittent asthma     mostly with allergies     Migraines      Pelvic pain in female      PONV (postoperative nausea and vomiting)      Seasonal allergies      Sphincter of Oddi dysfunction     Dr. Ras Juárez, had ercp, eus, panc stents placed     Spondyloarthropathy        PAST SURGICAL HISTORY:    Past Surgical History:   Procedure Laterality Date      SECTION N/A 2018    Procedure:  SECTION;;  Surgeon: Crista Nicole MD;  Location:  L+D     ENDOSCOPIC RETROGRADE CHOLANGIOPANCREATOGRAM  6/10/2014    Procedure: ENDOSCOPIC RETROGRADE CHOLANGIOPANCREATOGRAM;  Surgeon: Ras Juárez MD;  Location:  OR     ENDOSCOPIC RETROGRADE CHOLANGIOPANCREATOGRAPHY       ESOPHAGOSCOPY, GASTROSCOPY, DUODENOSCOPY (EGD), COMBINED  2014    Procedure: COMBINED ESOPHAGOSCOPY, GASTROSCOPY, DUODENOSCOPY (EGD), REMOVE FOREIGN BODY;  Surgeon: Gabbie Menjivar MD;  Location:  GI     GYN SURGERY      LEEP      UGI ENDOSCOPY W EUS N/A 2014    Procedure: COMBINED ENDOSCOPIC ULTRASOUND, ESOPHAGOSCOPY, GASTROSCOPY, DUODENOSCOPY (EGD);  Surgeon: Gabbie Menjivar MD;  Location:  GI     LAPAROSCOPIC CHOLECYSTECTOMY       LAPAROSCOPIC MYOMECTOMY UTERUS N/A  10/30/2015    Procedure: LAPAROSCOPIC MYOMECTOMY UTERUS;  Surgeon: Mayda Dior MD;  Location: [facility] OR       FAMILY HISTORY:    Family History   Adopted: Yes   Problem Relation Age of Onset     Unknown/Adopted Father        SOCIAL HISTORY:    Social History     Socioeconomic History     Marital status:      Spouse name: Janet     Number of children: 0     Years of education: None     Highest education level: None   Occupational History     Occupation:      Employer: [employer]   Tobacco Use     Smoking status: Never     Smokeless tobacco: Never   Substance and Sexual Activity     Alcohol use: Yes     Alcohol/week: 3.0 standard drinks     Types: 3 Standard drinks or equivalent per week     Drug use: No     Sexual activity: Yes     Partners: Male                         Thank you for allowing me to participate in the care of your patient.      Sincerely,     Tom Knight MD     [facility]  cc:   No referring provider defined for this encounter.

## 2022-10-11 NOTE — PROGRESS NOTES
"  [id number] DICTATION ID           PHYSICAL EXAMINATION:  Vitals: BP (!) 146/98   Pulse 73   Ht 1.638 m (5' 4.5\")   Wt 78.5 kg (173 lb)   SpO2 97%   BMI 29.24 kg/m    Wt Readings from Last 5 Encounters:   02/02/23 76.2 kg (168 lb)   12/23/22 74.8 kg (165 lb)   12/17/22 74.8 kg (165 lb)   12/13/22 77.1 kg (170 lb)   11/30/22 77.1 kg (170 lb)     CARDIOVASCULAR:  Regular heart sounds.  No murmur. No carotid bruit.  RESPIRATORY:  Normal breath sounds. No rales or wheeze.  EXTREMITIES:  No edema.        Encounter Diagnosis   Name Primary?     Uncontrolled hypertension Yes         Orders Placed This Encounter   Procedures     US Renal Complete w Doppler Complete     Basic metabolic panel     Basic metabolic panel     Follow-Up with Cardiology Nurse     Follow-Up with Cardiology     Echocardiogram Complete           CURRENT MEDICATIONS:  Current Outpatient Medications   Medication Sig Dispense Refill     Acetaminophen (TYLENOL PO) Take by mouth as needed for mild pain or fever       Cetirizine-Pseudoephedrine (ZYRTEC-D PO) Take by mouth daily        fluticasone (FLONASE) 50 MCG/ACT nasal spray Spray 1-2 sprays into both nostrils daily 3 Package 3     Probiotic Product (PROBIOTIC PO)        sulindac (CLINORIL) 200 MG tablet Take 200 mg by mouth daily        albuterol (PROAIR HFA) 108 (90 Base) MCG/ACT inhaler Inhale 2 puffs into the lungs every 6 hours as needed for shortness of breath / dyspnea Due for office visit: [phone] 18 g 0     albuterol (PROVENTIL) (2.5 MG/3ML) 0.083% neb solution Take 1 vial (2.5 mg) by nebulization every 6 hours as needed for shortness of breath, wheezing or cough 90 mL 0     cyclobenzaprine (FLEXERIL) 10 MG tablet Take 1 tablet (10 mg) by mouth 3 times daily as needed for muscle spasms 20 tablet 0     fluticasone-salmeterol (ADVAIR) 100-50 MCG/ACT inhaler Inhale 1 puff into the lungs every 12 hours       labetalol (NORMODYNE) 100 MG tablet Take 1 tablet (100 mg) by mouth 2 times daily " 200 tablet 4     losartan (COZAAR) 50 MG tablet Take 1 tablet (50 mg) by mouth 2 times daily Take 50 mg in the morning and 25 mg in the evening. 200 tablet 5     naproxen (NAPROSYN) 500 MG tablet Take 1 tablet (500 mg) by mouth 2 times daily (with meals) 30 tablet 0         ALLERGIES:  Allergies   Allergen Reactions     Amoxicillin Rash     Diclofenac Nausea and Vomiting     Keflex [Cephalexin Monohydrate] Rash     Sulfa Drugs Nausea and Vomiting     Animal Dander      Seasonal Allergies        PAST MEDICAL HISTORY:    Past Medical History:   Diagnosis Date     Arthritis      Cervical dysplasia      Condyloma acuminatum      Elevated liver enzymes 2012    felt due to fatty liver     Fibroids      Gastro-oesophageal reflux disease      H/O cold sores      HPV in female      Hx of previous reproductive problem     IVF     Hypertension     during pregnancy     Intermittent asthma     mostly with allergies     Migraines      Pelvic pain in female      PONV (postoperative nausea and vomiting)      Seasonal allergies      Sphincter of Oddi dysfunction     Dr. Ras Juárez, had ercp, eus, panc stents placed     Spondyloarthropathy        PAST SURGICAL HISTORY:    Past Surgical History:   Procedure Laterality Date      SECTION N/A 2018    Procedure:  SECTION;;  Surgeon: Crista Nicole MD;  Location:  L+D     ENDOSCOPIC RETROGRADE CHOLANGIOPANCREATOGRAM  6/10/2014    Procedure: ENDOSCOPIC RETROGRADE CHOLANGIOPANCREATOGRAM;  Surgeon: Ras Juárez MD;  Location:  OR     ENDOSCOPIC RETROGRADE CHOLANGIOPANCREATOGRAPHY       ESOPHAGOSCOPY, GASTROSCOPY, DUODENOSCOPY (EGD), COMBINED  2014    Procedure: COMBINED ESOPHAGOSCOPY, GASTROSCOPY, DUODENOSCOPY (EGD), REMOVE FOREIGN BODY;  Surgeon: Gabbie Menjivar MD;  Location:  GI     [facility] SUR[facility]      [facility]    Procedure: COMBINED ENDOSCOPIC ULTRASOUND, ESOPHAGOSCOPY, GASTROSCOPY,  DUODENOSCOPY (EGD);  Surgeon: Gabbie Menjivar MD;  Location: [facility] GI     LAPAROSCOPIC CHOLECYSTECTOMY  11/12     LAPAROSCOPIC MYOMECTOMY UTERUS N/A 10/30/2015    Procedure: LAPAROSCOPIC MYOMECTOMY UTERUS;  Surgeon: Mayda Dior MD;  Location:  OR       FAMILY HISTORY:    Family History   Adopted: Yes   Problem Relation Age of Onset     Unknown/Adopted Father        SOCIAL HISTORY:    Social History     Socioeconomic History     Marital status:      Spouse name: Janet     Number of children: 0     Years of education: None     Highest education level: None   Occupational History     Occupation:      Employer: [employer]   Tobacco Use     Smoking status: Never     Smokeless tobacco: Never   Substance and Sexual Activity     Alcohol use: Yes     Alcohol/week: 3.0 standard drinks     Types: 3 Standard drinks or equivalent per week     Drug use: No     Sexual activity: Yes     Partners: Male

## 2022-11-14 ENCOUNTER — OFFICE VISIT (OUTPATIENT)
Dept: URGENT CARE | Facility: URGENT CARE | Age: 42
End: 2022-11-14
Payer: COMMERCIAL

## 2022-11-14 VITALS
BODY MASS INDEX: 26.66 KG/M2 | HEIGHT: 65 IN | DIASTOLIC BLOOD PRESSURE: 98 MMHG | OXYGEN SATURATION: 96 % | WEIGHT: 160 LBS | HEART RATE: 80 BPM | TEMPERATURE: 97 F | SYSTOLIC BLOOD PRESSURE: 161 MMHG

## 2022-11-14 DIAGNOSIS — J06.9 UPPER RESPIRATORY TRACT INFECTION, UNSPECIFIED TYPE: ICD-10-CM

## 2022-11-14 DIAGNOSIS — J45.20 MILD INTERMITTENT ASTHMA WITHOUT COMPLICATION: Primary | ICD-10-CM

## 2022-11-14 LAB — RSV AG SPEC QL: NEGATIVE

## 2022-11-14 PROCEDURE — 87807 RSV ASSAY W/OPTIC: CPT | Performed by: PHYSICIAN ASSISTANT

## 2022-11-14 PROCEDURE — 99213 OFFICE O/P EST LOW 20 MIN: CPT | Performed by: PHYSICIAN ASSISTANT

## 2022-11-14 RX ORDER — PREDNISONE 20 MG/1
20 TABLET ORAL 2 TIMES DAILY
Qty: 10 TABLET | Refills: 0 | Status: SHIPPED | OUTPATIENT
Start: 2022-11-14 | End: 2022-11-19

## 2022-11-14 RX ORDER — BENZONATATE 100 MG/1
100 CAPSULE ORAL 3 TIMES DAILY PRN
Qty: 30 CAPSULE | Refills: 0 | Status: SHIPPED | OUTPATIENT
Start: 2022-11-14 | End: 2023-02-02

## 2022-11-14 RX ORDER — ALBUTEROL SULFATE 90 UG/1
2 AEROSOL, METERED RESPIRATORY (INHALATION) EVERY 6 HOURS PRN
Qty: 18 G | Refills: 0 | Status: SHIPPED | OUTPATIENT
Start: 2022-11-14

## 2022-11-14 NOTE — PROGRESS NOTES
Mild intermittent asthma without complication  - albuterol (PROAIR HFA) 108 (90 Base) MCG/ACT inhaler; Inhale 2 puffs into the lungs every 6 hours as needed for shortness of breath / dyspnea Due for office visit: [phone]  - benzonatate (TESSALON) 100 MG capsule; Take 1 capsule (100 mg) by mouth 3 times daily as needed for cough  - RSV rapid antigen; Future  - predniSONE (DELTASONE) 20 MG tablet; Take 1 tablet (20 mg) by mouth 2 times daily for 5 days  - RSV rapid antigen    Upper respiratory tract infection, unspecified type  - predniSONE (DELTASONE) 20 MG tablet; Take 1 tablet (20 mg) by mouth 2 times daily for 5 days    Age 12 months or more  Okay to use Zarbee's   Okay to use Rx Children Tylenol if prescribed (Dose based on weight)    Age 2-12:   Okay to use Children Motrin or Tylenol over the counter.    Adults:  Okay to take acetaminophen 500 mg- 2 tabs (Total of 1000 mg) every 8 hrs   Okay to take ibuprofen 200 mg- 3 tabs (Total of 600 mg) every 6 hours        Okay to use Neti pot for sinus lavage up to three times daily for congestion and sinus pressure if present. Daily hot shower can be beneficial for congestion and body aches. Okay to use bedroom vaporizer or humidifier if symptoms are worse at night. Nightly Vicks Vapor rub and 5-10 mg of Melatonin okay to use for sleep.     Over the counter cough medication and decongestants okay if not prescribed by me during this visit. For homeopathic alternatives to cough syrup and decongestant, feel free to try Elderberry extract.    Okay to use salt water gargles, warm tea (or warm water with lemon and honey), and lozenges for any throat discomfort. Chloraseptic spray is also highly encourages for throat pain/irritation.     Patient will need to get plenty of rest and drink at least 1.5-2 liters of fluids daily for adults and 1-1.5 liters for children. If vomiting and not tolerating liquids for more than 24 hrs, please go to your nearest emergency department  for IV fluids and further treatment.     Patient is not contagious after 1 week from start of symptoms. If possible, wear mask for first 7 days. Wash hands regularly and vigorously for 30 seconds often.         [facility]    Subjective   42 year old who presents to clinic today for the following health issues:    Urgent Care and Respiratory Problems       HPI     Acute Illness  Acute illness concerns: Pt in clinic to have eval for cough and congestion for 6 days.  Onset/Duration: 6 days  Symptoms:  Fever: No  Chills/Sweats: No  Headache (location?): No  Sinus Pressure: No  Conjunctivitis:  No  Ear Pain: no  Rhinorrhea: YES  Congestion: YES  Sore Throat: Not currently   Cough: YES  Wheeze: YES- But patient has been using her inhaler for asthma   Decreased Appetite: No  Nausea: No  Vomiting: No  Diarrhea: No  Dysuria/Freq.: No  Dysuria or Hematuria: No  Fatigue/Achiness: No  Sick/Strep Exposure: No  Therapies tried and outcome: Inhaler     Review of Systems   Review of Systems   See HPI    Objective    Temp: 97  F (36.1  C) Temp src: Temporal BP: (!) 161/98 Pulse: 80     SpO2: 96 %       Physical Exam   Physical Exam  Constitutional:       General: She is not in acute distress.     Appearance: Normal appearance. She is normal weight. She is not ill-appearing, toxic-appearing or diaphoretic.   HENT:      Head: Normocephalic and atraumatic.      Right Ear: Tympanic membrane, ear canal and external ear normal. There is no impacted cerumen.      Left Ear: Tympanic membrane, ear canal and external ear normal. There is no impacted cerumen.      Nose: Congestion and rhinorrhea present.      Mouth/Throat:      Mouth: Mucous membranes are moist.      Pharynx: Oropharynx is clear. No oropharyngeal exudate or posterior oropharyngeal erythema.   Cardiovascular:      Rate and Rhythm: Normal rate and regular rhythm.      Pulses: Normal pulses.      Heart sounds: Normal heart sounds. No  murmur heard.    No friction rub. No gallop.   Pulmonary:      Effort: Pulmonary effort is normal. No respiratory distress.      Breath sounds: Normal breath sounds. No stridor. No wheezing, rhonchi or rales.   Chest:      Chest wall: No tenderness.   Lymphadenopathy:      Cervical: No cervical adenopathy.   Neurological:      Mental Status: She is alert.   Psychiatric:         Mood and Affect: Mood normal.         Behavior: Behavior normal.         Thought Content: Thought content normal.         Judgment: Judgment normal.          No results found for this or any previous visit (from the past 24 hour(s)).

## 2022-11-17 ENCOUNTER — LAB (OUTPATIENT)
Dept: LAB | Facility: CLINIC | Age: 42
End: 2022-11-17
Payer: COMMERCIAL

## 2022-11-17 ENCOUNTER — ALLIED HEALTH/NURSE VISIT (OUTPATIENT)
Dept: CARDIOLOGY | Facility: CLINIC | Age: 42
End: 2022-11-17
Attending: INTERNAL MEDICINE
Payer: COMMERCIAL

## 2022-11-17 ENCOUNTER — TELEPHONE (OUTPATIENT)
Dept: CARDIOLOGY | Facility: CLINIC | Age: 42
End: 2022-11-17

## 2022-11-17 VITALS — HEART RATE: 76 BPM | DIASTOLIC BLOOD PRESSURE: 82 MMHG | SYSTOLIC BLOOD PRESSURE: 138 MMHG

## 2022-11-17 DIAGNOSIS — I10 UNCONTROLLED HYPERTENSION: ICD-10-CM

## 2022-11-17 LAB
ANION GAP SERPL CALCULATED.3IONS-SCNC: 3 MMOL/L (ref 3–14)
BUN SERPL-MCNC: 18 MG/DL (ref 7–30)
CALCIUM SERPL-MCNC: 9 MG/DL (ref 8.5–10.1)
CHLORIDE BLD-SCNC: 106 MMOL/L (ref 94–109)
CO2 SERPL-SCNC: 29 MMOL/L (ref 20–32)
CREAT SERPL-MCNC: 0.82 MG/DL (ref 0.52–1.04)
GFR SERPL CREATININE-BSD FRML MDRD: >90 ML/MIN/1.73M2
GLUCOSE BLD-MCNC: 132 MG/DL (ref 70–99)
POTASSIUM BLD-SCNC: 3.8 MMOL/L (ref 3.4–5.3)
SODIUM SERPL-SCNC: 138 MMOL/L (ref 133–144)

## 2022-11-17 PROCEDURE — 80048 BASIC METABOLIC PNL TOTAL CA: CPT | Performed by: INTERNAL MEDICINE

## 2022-11-17 PROCEDURE — 36415 COLL VENOUS BLD VENIPUNCTURE: CPT | Performed by: INTERNAL MEDICINE

## 2022-11-17 PROCEDURE — 99207 PR NO CHARGE LOS: CPT

## 2022-11-17 RX ORDER — LOSARTAN POTASSIUM 25 MG/1
TABLET ORAL
Qty: 90 TABLET | Refills: 3 | Status: SHIPPED | OUTPATIENT
Start: 2022-11-17 | End: 2023-02-02

## 2022-11-17 RX ORDER — LOSARTAN POTASSIUM 50 MG/1
TABLET ORAL
Qty: 90 TABLET | Refills: 3 | Status: SHIPPED | OUTPATIENT
Start: 2022-11-17 | End: 2023-02-02

## 2022-11-17 NOTE — TELEPHONE ENCOUNTER
----- Message from Brittni Kenney LPN sent at 11/17/2022  1:50 PM CST -----  Regarding: BP   Labs drawn today, also, patient is sick today and has been using her inhaler plus prednisone.   Patient needs losartan written as 50mg once daily and sent to her pharmacy [facility] [address]. Please, Fax over to them today.    Thank you,    Brittni      Awaiting BMP from today before refilling losartan. BP in clinic today was 138/82, HR 78bpm.    Patient instructions from OV 10/11/22 w/ Dr Knight-   1.  Start losartan 25 mg.  Take 1 tablet daily in the morning.  2.  At the same time, continue your labetalol 200 mg two times daily.  3.  In 2 weeks, if your blood pressure remains 140/80 or above, increase losartan to 50 mg daily in the morning.  In other words, take 2 of the 25 mg pills.  4.  In 4 weeks, nurse blood pressure visit and blood test.  Dr. Knight's office will contact you with recommendations.  5.  Follow-up with Dr. Knight with echocardiogram and kidney ultrasound  in 3 months.      Addendum 1440: BMP now available, routed to Dr Knight.     Component      Latest Ref Rng & Units 11/17/2022   Sodium      133 - 144 mmol/L 138   Potassium      3.4 - 5.3 mmol/L 3.8   Chloride      94 - 109 mmol/L 106   Carbon Dioxide      20 - 32 mmol/L 29   Anion Gap      3 - 14 mmol/L 3   Urea Nitrogen      7 - 30 mg/dL 18   Creatinine      0.52 - 1.04 mg/dL 0.82   Calcium      8.5 - 10.1 mg/dL 9.0   Glucose      70 - 99 mg/dL 132 (H)   GFR Estimate      >60 mL/min/1.73m2 >90

## 2022-11-17 NOTE — TELEPHONE ENCOUNTER
Message from Dr. Knight:  Please have her take losartan 50 mg in the morning and 25 mg in the evening.  Continue labetalol at current doses.  Follow-up as planned.     Thank you.   Dr. Knight     1500 called patient to review Dr. Knight's recommendation to increase her losartan to 50mg q AM and 25mg qPM. Patient states she would prefer to have 2 different bottles with the 2 doses.  Rx escripted for 2 different doses as requested.

## 2022-11-17 NOTE — PROGRESS NOTES
Last office visit: 22    Previous blood pressure:  146/98    Mm Hg     Previous heart rate:    73  bpm    Time of readin:50pm    Morning medications were taken at: 8am    Today's blood pressure: 138/82      mm Hg    Today's heart rate:   76    bpm     Ordering Provider:     Results sent to team # : TEAM 2    Additional comments: Labs drawn today, also, patient is sick today and has been using her inhaler plus prednisone.   Patient needs losartan written as 50mg once daily and sent to her pharmacy [facility]. Please, Fax over to them today.    Brittni Kenney LPN

## 2022-11-30 ENCOUNTER — OFFICE VISIT (OUTPATIENT)
Dept: FAMILY MEDICINE | Facility: CLINIC | Age: 42
End: 2022-11-30
Payer: COMMERCIAL

## 2022-11-30 VITALS
TEMPERATURE: 97.6 F | RESPIRATION RATE: 15 BRPM | WEIGHT: 170 LBS | HEART RATE: 79 BPM | SYSTOLIC BLOOD PRESSURE: 142 MMHG | DIASTOLIC BLOOD PRESSURE: 97 MMHG | HEIGHT: 65 IN | OXYGEN SATURATION: 95 % | BODY MASS INDEX: 28.32 KG/M2

## 2022-11-30 DIAGNOSIS — J02.9 SORE THROAT: ICD-10-CM

## 2022-11-30 DIAGNOSIS — J06.9 VIRAL URI: Primary | ICD-10-CM

## 2022-11-30 PROCEDURE — 99212 OFFICE O/P EST SF 10 MIN: CPT | Performed by: NURSE PRACTITIONER

## 2022-11-30 NOTE — PROGRESS NOTES
"  Assessment & Plan   Problem List Items Addressed This Visit    None  Visit Diagnoses     Viral URI    -  Primary    Sore throat               Suspect viral etiology. No evidence for bacteria today. Suspect she could be sick for another week based on what I've been seeing lately. She should call right away with fevers or worsening symptoms.     KAREN Bryant CNP  [facility] [place] is a 42 year old, presenting for the following health issues:  Throat Problem (URI 2 weeks ago, sore throat now x 10 days) and Headache      HPI     URI for about 3 weeks  Now sore throat x10 days  Feels it more on the right and in the ear   No recent fevers   Was coughing a lot but that has greatly improved   Was evaluated and tx viral illness       Review of Systems   Detailed as above         Objective    BP (!) 141/93 (BP Location: Right arm, Patient Position: Sitting, Cuff Size: Adult Regular)   Pulse 79   Temp 97.6  F (36.4  C)   Resp 15   Ht 1.651 m (5' 5\")   Wt 77.1 kg (170 lb)   LMP 11/26/2022   SpO2 95%   BMI 28.29 kg/m    Body mass index is 28.29 kg/m .  Physical Exam  Constitutional:       Appearance: Normal appearance.   HENT:      Head: Normocephalic.      Right Ear: Tympanic membrane, ear canal and external ear normal.      Left Ear: Tympanic membrane, ear canal and external ear normal.      Nose: Mucosal edema present.      Comments: Mucosal excoriation bilaterally     Mouth/Throat:      Pharynx: No oropharyngeal exudate.      Comments: Mild cobblestoning   Eyes:      Conjunctiva/sclera: Conjunctivae normal.   Pulmonary:      Effort: Pulmonary effort is normal.   Musculoskeletal:      Cervical back: Normal range of motion.   Lymphadenopathy:      Cervical: Cervical adenopathy (minimal) present.   Skin:     General: Skin is warm and dry.   Neurological:      Mental Status: She is alert.   Psychiatric:         Mood and Affect: Mood normal.                    "

## 2022-12-07 ENCOUNTER — TELEPHONE (OUTPATIENT)
Dept: CARDIOLOGY | Facility: CLINIC | Age: 42
End: 2022-12-07

## 2022-12-07 NOTE — TELEPHONE ENCOUNTER
Spoke with patient, in November she was not given the bottle of losartan 25mg. She has been using her bottle of 50mg and breaking some in 1/2 for her evening dose. Now she is almost out of pills.    Spoke with [facility] pharmacy. They have an active script for the 25mg dose and will fill that today. No reason why it was not given in November. They can refill the 50mg dose on 12/10/2022.     Called patient with update.

## 2022-12-07 NOTE — TELEPHONE ENCOUNTER
Health Call Center    Phone Message    May a detailed message be left on voicemail: yes     Reason for Call: Medication Refill Request    Has the patient contacted the pharmacy for the refill? Yes   Name of medication being requested: losartan (COZAAR)   Provider who prescribed the medication: Dr. Knight  Pharmacy: [id number] - [place], [place] - [address]    Date medication is needed: Patient takes losartan 75 mg (50 mg in am  and 25 mg in the PM)  and needs this updated with the pharmacy and she is short on both strengths. She is wondering if should have this sent in as just a 25 mg and than she can take what she needs or what would be the best plan for this. Please call patient to discuss.          Action Taken: Other: cardiology    Travel Screening: Not Applicable   Thank you!  [facility]

## 2022-12-13 ENCOUNTER — OFFICE VISIT (OUTPATIENT)
Dept: URGENT CARE | Facility: URGENT CARE | Age: 42
End: 2022-12-13
Payer: COMMERCIAL

## 2022-12-13 ENCOUNTER — ANCILLARY PROCEDURE (OUTPATIENT)
Dept: GENERAL RADIOLOGY | Facility: CLINIC | Age: 42
End: 2022-12-13
Attending: FAMILY MEDICINE
Payer: COMMERCIAL

## 2022-12-13 VITALS
WEIGHT: 170 LBS | OXYGEN SATURATION: 99 % | SYSTOLIC BLOOD PRESSURE: 118 MMHG | HEART RATE: 90 BPM | HEIGHT: 65 IN | TEMPERATURE: 98.7 F | BODY MASS INDEX: 28.32 KG/M2 | RESPIRATION RATE: 18 BRPM | DIASTOLIC BLOOD PRESSURE: 84 MMHG

## 2022-12-13 DIAGNOSIS — R05.9 COUGH IN ADULT: ICD-10-CM

## 2022-12-13 DIAGNOSIS — J45.20 MILD INTERMITTENT ASTHMA WITHOUT COMPLICATION: Primary | ICD-10-CM

## 2022-12-13 PROCEDURE — 99214 OFFICE O/P EST MOD 30 MIN: CPT | Performed by: FAMILY MEDICINE

## 2022-12-13 PROCEDURE — 71046 X-RAY EXAM CHEST 2 VIEWS: CPT | Mod: TC | Performed by: RADIOLOGY

## 2022-12-13 RX ORDER — PREDNISONE 20 MG/1
20 TABLET ORAL 2 TIMES DAILY
Qty: 10 TABLET | Refills: 0 | Status: SHIPPED | OUTPATIENT
Start: 2022-12-13 | End: 2022-12-18

## 2022-12-13 RX ORDER — BENZONATATE 100 MG/1
100 CAPSULE ORAL 3 TIMES DAILY PRN
Qty: 30 CAPSULE | Refills: 0 | Status: SHIPPED | OUTPATIENT
Start: 2022-12-13 | End: 2022-12-23

## 2022-12-13 NOTE — PROGRESS NOTES
Chief Complaint   Patient presents with     Urgent Care     URI     Sick on and off for 6 weeks, sick again. H/O Asthma, Chest hurts, body aches.    Kelly was seen today for urgent care and uri.    Diagnoses and all orders for this visit:    Mild intermittent asthma without complication  -     predniSONE (DELTASONE) 20 MG tablet; Take 1 tablet (20 mg) by mouth 2 times daily for 5 days    Cough in adult  -     XR Chest 2 Views  -     benzonatate (TESSALON) 100 MG capsule; Take 1 capsule (100 mg) by mouth 3 times daily as needed      D/d  Acute Bronchitis  Acute Sinusitis  Cough  Pneumonia  Prolonged Cough  Upper Respiratory Infection    PLAN:  See orders in Epic  Symptomatic measures encouraged, humidified air, plenty of fluids.  Xray was WNL   Discussed with patient to continue symptomatic treatment.  Continue on the inhaler and also will treat with Pred with the wheezing symptoms.  If cough gets worse or notices high fever or cough gets productive then would consider treating with an antibiotic as there is no fever now and also x-ray is within normal limits no further antibiotic treatment is needed at this point.      SUBJECTIVE:  Kelly Wright is a 42 year old female with h/o asthma  who presents to the clinic today with a chief complaint of cough , shortness of breath. and wheezing. for 4 day(s).  Her cough is described as persistent, nonproductive and wheezing.    The patient's symptoms are moderate and worsening.  Associated symptoms include chest pain The patient's symptoms are exacerbated by no particular triggers  Patient has been using inhaler  to improve symptoms.  She has been sick for 5-6 weeks on and off with cold symptoms   But then did recover but then symptoms came back    Past Medical History:   Diagnosis Date     Arthritis      Cervical dysplasia      Condyloma acuminatum      Elevated liver enzymes 09/2012    felt due to fatty liver     Fibroids      Gastro-oesophageal reflux disease       H/O cold sores      HPV in female      Hx of previous reproductive problem     IVF     Hypertension     during pregnancy     Intermittent asthma     mostly with allergies     Migraines 2007     Pelvic pain in female      PONV (postoperative nausea and vomiting)      Seasonal allergies      Sphincter of Oddi dysfunction 2014    Dr. Ras Juárez, had ercp, eus, panc stents placed     Spondyloarthropathy        Current Outpatient Medications   Medication Sig Dispense Refill     Acetaminophen (TYLENOL PO) Take by mouth as needed for mild pain or fever       albuterol (PROAIR HFA) 108 (90 Base) MCG/ACT inhaler Inhale 2 puffs into the lungs every 6 hours as needed for shortness of breath / dyspnea Due for office visit: [phone] 18 g 0     benzonatate (TESSALON) 100 MG capsule Take 1 capsule (100 mg) by mouth 3 times daily as needed 30 capsule 0     Cetirizine-Pseudoephedrine (ZYRTEC-D PO) Take by mouth daily        fluticasone (FLONASE) 50 MCG/ACT nasal spray Spray 1-2 sprays into both nostrils daily 3 Package 3     labetalol (NORMODYNE) 200 MG tablet Take 200 mg by mouth 2 times daily       losartan (COZAAR) 25 MG tablet Take 1 tablet (25mg) every evening + AM dose of 50mg for total of 75mg daily 90 tablet 3     predniSONE (DELTASONE) 20 MG tablet Take 1 tablet (20 mg) by mouth 2 times daily for 5 days 10 tablet 0     Probiotic Product (PROBIOTIC PO)        sulindac (CLINORIL) 200 MG tablet Take 200 mg by mouth daily        benzonatate (TESSALON) 100 MG capsule Take 1 capsule (100 mg) by mouth 3 times daily as needed for cough (Patient not taking: Reported on 11/30/2022) 30 capsule 0     losartan (COZAAR) 50 MG tablet Take 1 tablet (50mg) every morning + PM dose of 25mg for total of 75mg daily 90 tablet 3       Social History     Tobacco Use     Smoking status: Never     Smokeless tobacco: Never   Substance Use Topics     Alcohol use: Yes     Alcohol/week: 3.0 standard drinks     Types: 3 Standard drinks or  "equivalent per week       ROS  Review of systems negative except as stated above.    OBJECTIVE:  /84   Pulse 90   Temp 98.7  F (37.1  C) (Temporal)   Resp 18   Ht 1.651 m (5' 5\")   Wt 77.1 kg (170 lb)   LMP 11/26/2022   SpO2 99%   BMI 28.29 kg/m    GENERAL APPEARANCE: healthy, alert and no distress  EYES: EOMI,  PERRL, conjunctiva clear  HENT: ear canals and TM's normal.  Nose and mouth without ulcers, erythema or lesions  NECK: supple, nontender, no lymphadenopathy  RESP: lungs clear to auscultation - no rales, rhonchi or wheezes  CV: regular rates and rhythm, normal S1 S2, no murmur noted  PSYCH: mentation appears normal    "

## 2022-12-16 ENCOUNTER — NURSE TRIAGE (OUTPATIENT)
Dept: FAMILY MEDICINE | Facility: CLINIC | Age: 42
End: 2022-12-16

## 2022-12-17 ENCOUNTER — OFFICE VISIT (OUTPATIENT)
Dept: URGENT CARE | Facility: URGENT CARE | Age: 42
End: 2022-12-17
Payer: COMMERCIAL

## 2022-12-17 VITALS
HEART RATE: 70 BPM | TEMPERATURE: 97.7 F | SYSTOLIC BLOOD PRESSURE: 146 MMHG | DIASTOLIC BLOOD PRESSURE: 95 MMHG | OXYGEN SATURATION: 97 % | BODY MASS INDEX: 27.46 KG/M2 | WEIGHT: 165 LBS

## 2022-12-17 DIAGNOSIS — J45.21 MILD INTERMITTENT ASTHMA WITH ACUTE EXACERBATION: ICD-10-CM

## 2022-12-17 DIAGNOSIS — J20.9 ACUTE BRONCHITIS, UNSPECIFIED ORGANISM: Primary | ICD-10-CM

## 2022-12-17 PROCEDURE — 99214 OFFICE O/P EST MOD 30 MIN: CPT | Performed by: PHYSICIAN ASSISTANT

## 2022-12-17 RX ORDER — AZITHROMYCIN 250 MG/1
TABLET, FILM COATED ORAL
Qty: 6 TABLET | Refills: 0 | Status: SHIPPED | OUTPATIENT
Start: 2022-12-17 | End: 2022-12-22

## 2022-12-17 RX ORDER — PREDNISONE 20 MG/1
40 TABLET ORAL DAILY
Qty: 10 TABLET | Refills: 0 | Status: SHIPPED | OUTPATIENT
Start: 2022-12-17 | End: 2022-12-22

## 2022-12-17 RX ORDER — FLUTICASONE PROPIONATE AND SALMETEROL 100; 50 UG/1; UG/1
1 POWDER RESPIRATORY (INHALATION) EVERY 12 HOURS
COMMUNITY
End: 2023-05-15

## 2022-12-17 RX ORDER — ALBUTEROL SULFATE 0.83 MG/ML
2.5 SOLUTION RESPIRATORY (INHALATION) EVERY 6 HOURS PRN
Qty: 90 ML | Refills: 0 | Status: SHIPPED | OUTPATIENT
Start: 2022-12-17

## 2022-12-17 NOTE — PROGRESS NOTES
Chief Complaint   Patient presents with     Urgent Care     Cough     Chest tightness, nasal congestion, Coughing spells getting worse, pt was seen here on Tuesday and had a cxr.       ASSESSMENT/PLAN:  Kelly was seen today for urgent care and cough.    Diagnoses and all orders for this visit:    Acute bronchitis, unspecified organism  -     azithromycin (ZITHROMAX) 250 MG tablet; Take 2 tablets (500 mg) by mouth daily for 1 day, THEN 1 tablet (250 mg) daily for 4 days.  -     albuterol (PROVENTIL) (2.5 MG/3ML) 0.083% neb solution; Take 1 vial (2.5 mg) by nebulization every 6 hours as needed for shortness of breath, wheezing or cough    Mild intermittent asthma with acute exacerbation  -     predniSONE (DELTASONE) 20 MG tablet; Take 2 tablets (40 mg) by mouth daily for 5 days  -     Nebulizer and Supplies Order for DME - ONLY FOR DME      Rhonchorous bronchial cough heard throughout exam.  No evidence of crackles and vitals reassuring makes it less likely to be pneumonia.  Given length of symptoms and worsening may be bacterial versus viral.  Will start on azithromycin and nebulizer given.  Discussed plus or minus prednisone based off of symptomatic improvement over the next few days or if she starts feeling wheezy to started.    Miguel Ángel Morrison PA-C      SUBJECTIVE:  Kelly is a 42 year old female who presents to urgent care with continued symptoms related to shortness of breath and chest congestion and cough.  She was seen a week ago and treated with prednisone.  She states the prednisone  did not improve her symptoms.    ROS: Pertinent ROS neg other than the symptoms noted above in the HPI.     OBJECTIVE:  BP (!) 146/95   Pulse 70   Temp 97.7  F (36.5  C) (Temporal)   Wt 74.8 kg (165 lb)   LMP 11/26/2022   SpO2 97%   BMI 27.46 kg/m     GENERAL: healthy, alert and no distress  EYES: Eyes grossly normal to inspection, PERRL and conjunctivae and sclerae normal  HENT: ear canals and TM's normal, nose and  mouth without ulcers or lesions, no significant oropharynx erythema  RESP: lungs clear to auscultation - no rales, rhonchi or wheezes, rhonchorous cough  CV: regular rate and rhythm, normal S1 S2, no S3 or S4, no murmur, click or rub    DIAGNOSTICS    No results found for any visits on 12/17/22.     Current Outpatient Medications   Medication     fluticasone-salmeterol (ADVAIR) 100-50 MCG/ACT inhaler     Acetaminophen (TYLENOL PO)     albuterol (PROAIR HFA) 108 (90 Base) MCG/ACT inhaler     benzonatate (TESSALON) 100 MG capsule     benzonatate (TESSALON) 100 MG capsule     Cetirizine-Pseudoephedrine (ZYRTEC-D PO)     fluticasone (FLONASE) 50 MCG/ACT nasal spray     labetalol (NORMODYNE) 200 MG tablet     losartan (COZAAR) 25 MG tablet     losartan (COZAAR) 50 MG tablet     predniSONE (DELTASONE) 20 MG tablet     Probiotic Product (PROBIOTIC PO)     sulindac (CLINORIL) 200 MG tablet     No current facility-administered medications for this visit.      Patient Active Problem List   Diagnosis     Seasonal allergies     Intermittent asthma     Fatty liver     Elevated liver enzymes     Sphincter of Oddi dysfunction     Hypercholesterolemia     Mild intermittent asthma without complication     Overweight (BMI 25.0-29.9)     Migraine without aura and without status migrainosus, not intractable     Gestational HTN      Past Medical History:   Diagnosis Date     Arthritis      Cervical dysplasia      Condyloma acuminatum      Elevated liver enzymes 09/2012    felt due to fatty liver     Fibroids      Gastro-oesophageal reflux disease      H/O cold sores      HPV in female      Hx of previous reproductive problem     IVF     Hypertension     during pregnancy     Intermittent asthma     mostly with allergies     Migraines 2007     Pelvic pain in female      PONV (postoperative nausea and vomiting)      Seasonal allergies      Sphincter of Oddi dysfunction 2014    Dr. Ras Juárez, had ercp, eus, panc stents placed      Spondyloarthropathy      Past Surgical History:   Procedure Laterality Date      SECTION N/A 2018    Procedure:  SECTION;;  Surgeon: Crista Nicole MD;  Location:  [facility]     ENDOSCOPIC RETROGRADE CHOLANGIOPANCREATOGRAM  6/10/2014    Procedure: ENDOSCOPIC RETROGRADE CHOLANGIOPANCREATOGRAM;  Surgeon: Ras Juárez MD;  Location:  OR     ENDOSCOPIC RETROGRADE CHOLANGIOPANCREATOGRAPHY       ESOPHAGOSCOPY, GASTROSCOPY, DUODENOSCOPY (EGD), COMBINED  2014    Procedure: COMBINED ESOPHAGOSCOPY, GASTROSCOPY, DUODENOSCOPY (EGD), REMOVE FOREIGN BODY;  Surgeon: Gabbie Menjivar MD;  Location:  GI     [facility]    Procedure: COMBINED ENDOSCOPIC ULTRASOUND, ESOPHAGOSCOPY, GASTROSCOPY, DUODENOSCOPY (EGD);  Surgeon: Gabbie Menjivar MD;  Location: [facility]     LAPAROSCOPIC CHOLECYSTECTOMY       LAPAROSCOPIC MYOMECTOMY UTERUS N/A 10/30/2015    Procedure: LAPAROSCOPIC MYOMECTOMY UTERUS;  Surgeon: Mayda Dior MD;  Location:  OR     Family History   Adopted: Yes   Problem Relation Age of Onset     Unknown/Adopted Father      Social History     Tobacco Use     Smoking status: Never     Smokeless tobacco: Never   Substance Use Topics     Alcohol use: Yes     Alcohol/week: 3.0 standard drinks     Types: 3 Standard drinks or equivalent per week              The plan of care was discussed with the patient. They understand and agree with the course of treatment prescribed. A printed summary was given including instructions and medications.  The use of Dragon/[email or web address]. dictation services may have been used to construct the content in this note; any grammatical or spelling errors are non-intentional. Please contact the author of this note directly if you are in need of any clarification.

## 2022-12-23 ENCOUNTER — OFFICE VISIT (OUTPATIENT)
Dept: URGENT CARE | Facility: URGENT CARE | Age: 42
End: 2022-12-23
Payer: COMMERCIAL

## 2022-12-23 VITALS
HEART RATE: 86 BPM | SYSTOLIC BLOOD PRESSURE: 124 MMHG | RESPIRATION RATE: 16 BRPM | BODY MASS INDEX: 27.46 KG/M2 | DIASTOLIC BLOOD PRESSURE: 86 MMHG | TEMPERATURE: 98.1 F | WEIGHT: 165 LBS | OXYGEN SATURATION: 98 %

## 2022-12-23 DIAGNOSIS — R05.9 COUGH IN ADULT: ICD-10-CM

## 2022-12-23 DIAGNOSIS — R07.81 RIB PAIN ON LEFT SIDE: Primary | ICD-10-CM

## 2022-12-23 PROCEDURE — 99213 OFFICE O/P EST LOW 20 MIN: CPT | Performed by: PHYSICIAN ASSISTANT

## 2022-12-23 RX ORDER — CYCLOBENZAPRINE HCL 10 MG
10 TABLET ORAL 3 TIMES DAILY PRN
Qty: 20 TABLET | Refills: 0 | Status: SHIPPED | OUTPATIENT
Start: 2022-12-23 | End: 2023-05-15

## 2022-12-23 RX ORDER — BENZONATATE 100 MG/1
100 CAPSULE ORAL 3 TIMES DAILY PRN
Qty: 30 CAPSULE | Refills: 0 | Status: SHIPPED | OUTPATIENT
Start: 2022-12-23 | End: 2023-02-02

## 2022-12-23 RX ORDER — NAPROXEN 500 MG/1
500 TABLET ORAL 2 TIMES DAILY WITH MEALS
Qty: 30 TABLET | Refills: 0 | Status: SHIPPED | OUTPATIENT
Start: 2022-12-23 | End: 2023-05-15

## 2022-12-23 ASSESSMENT — ENCOUNTER SYMPTOMS
WHEEZING: 0
SHORTNESS OF BREATH: 0
COUGH: 1
FEVER: 0

## 2022-12-23 NOTE — PATIENT INSTRUCTIONS
Start ice/heat to help with pain, can alternate as needed.     Use Tylenol in addition to NSAID to help with additional pain.     You can take Flexeril to also help with pain, mostly at night since this causes drowsiness.

## 2022-12-23 NOTE — PROGRESS NOTES
Assessment & Plan:        ICD-10-CM    1. Rib pain on left side  R07.81 naproxen (NAPROSYN) 500 MG tablet     cyclobenzaprine (FLEXERIL) 10 MG tablet      2. Cough in adult  R05.9 benzonatate (TESSALON) 100 MG capsule            Plan/Clinical Decision Making:    Patient with tenderness of left ribs after recent illness. Still has lingering cough. Lungs CTA.   Ibuprofen not working for pain. Patient can try Naproxen, discussed side effects, Take Tylenol at same time to help with pain management. Can use Flexeril if needed for more severe pain.   Refilled Tessalon to help with cough.     Patient Instructions   Start ice/heat to help with pain, can alternate as needed.     Use Tylenol in addition to NSAID to help with additional pain.     You can take Flexeril to also help with pain, mostly at night since this causes drowsiness.       Return if symptoms worsen or fail to improve, for in 5-7 days.     At the end of the encounter, I discussed results, diagnosis, medications. Discussed red flags for immediate return to clinic/ER, as well as indications for follow up if no improvement. Patient understood and agreed to plan. Patient was stable for discharge.        Mary Jones PA-C on 12/23/2022 at 12:24 PM          Subjective:     HPI:    Kelly is a 42 year old female who presents to clinic today for the following health issues:  Chief Complaint   Patient presents with     Urgent Care     Cough     Bronchitis x2 weeks, ribs bruising and painful x1 week and worsening.       HPI    Patient complains of left rib pain, patient is taking ibuprofen and not helping.   Has had bronchitis and a bit of coughing recently. Cough  History obtained from the patient.    Review of Systems   Constitutional: Negative for fever.   Respiratory: Positive for cough. Negative for shortness of breath and wheezing.          Patient Active Problem List   Diagnosis     Seasonal allergies     Intermittent asthma     Fatty liver     Elevated  liver enzymes     Sphincter of Oddi dysfunction     Hypercholesterolemia     Mild intermittent asthma without complication     Overweight (BMI 25.0-29.9)     Migraine without aura and without status migrainosus, not intractable     Gestational HTN        Past Medical History:   Diagnosis Date     Arthritis      Cervical dysplasia      Condyloma acuminatum      Elevated liver enzymes 09/2012    felt due to fatty liver     Fibroids      Gastro-oesophageal reflux disease      H/O cold sores      HPV in female      Hx of previous reproductive problem     IVF     Hypertension     during pregnancy     Intermittent asthma     mostly with allergies     Migraines 2007     Pelvic pain in female      PONV (postoperative nausea and vomiting)      Seasonal allergies      Sphincter of Oddi dysfunction 2014    Dr. Ras Juárez, had ercp, eus, panc stents placed     Spondyloarthropathy        Social History     Tobacco Use     Smoking status: Never     Smokeless tobacco: Never   Substance Use Topics     Alcohol use: Yes     Alcohol/week: 3.0 standard drinks     Types: 3 Standard drinks or equivalent per week             Objective:     Vitals:    12/23/22 1158   BP: 124/86   Pulse: 86   Resp: 16   Temp: 98.1  F (36.7  C)   TempSrc: Oral   SpO2: 98%   Weight: 74.8 kg (165 lb)         Physical Exam   EXAM:   Pleasant, alert, appropriate appearance. NAD.  Head Exam: Normocephalic, atraumatic.  Chest/Respiratory Exam: CTAB.  Pain palpation of left ribcage/lateral aspect. Good ROM  Neuro: CN II-XII intact grossly intact.  Skin: no rash or lesion.      Results:  No results found for any visits on 12/23/22.

## 2023-01-10 ENCOUNTER — TELEPHONE (OUTPATIENT)
Dept: CARDIOLOGY | Facility: CLINIC | Age: 43
End: 2023-01-10

## 2023-01-10 NOTE — TELEPHONE ENCOUNTER
[facility] Call Center    Phone Message    May a detailed message be left on voicemail: yes     Reason for Call: Appointment Intake    Referring Provider Name: Dr osuna    Reschedule appts on 1/11/23:  US RENAL COMP W ART DPLX , Echo, Lab = Dr Osuna scheduled 2/2/23      Action Taken: Other: Cardiology    Travel Screening: Not Applicable

## 2023-01-23 ENCOUNTER — LAB (OUTPATIENT)
Dept: LAB | Facility: CLINIC | Age: 43
End: 2023-01-23
Payer: COMMERCIAL

## 2023-01-23 ENCOUNTER — HOSPITAL ENCOUNTER (OUTPATIENT)
Dept: CARDIOLOGY | Facility: CLINIC | Age: 43
Discharge: HOME | End: 2023-01-23
Attending: INTERNAL MEDICINE
Payer: COMMERCIAL

## 2023-01-23 DIAGNOSIS — I10 UNCONTROLLED HYPERTENSION: ICD-10-CM

## 2023-01-23 LAB
ANION GAP SERPL CALCULATED.3IONS-SCNC: 9 MMOL/L (ref 7–15)
BUN SERPL-MCNC: 16.9 MG/DL (ref 6–20)
CALCIUM SERPL-MCNC: 9.4 MG/DL (ref 8.6–10)
CHLORIDE SERPL-SCNC: 103 MMOL/L (ref 98–107)
CREAT SERPL-MCNC: 0.89 MG/DL (ref 0.51–0.95)
DEPRECATED HCO3 PLAS-SCNC: 26 MMOL/L (ref 22–29)
GFR SERPL CREATININE-BSD FRML MDRD: 83 ML/MIN/1.73M2
GLUCOSE SERPL-MCNC: 103 MG/DL (ref 70–99)
LVEF ECHO: NORMAL
POTASSIUM SERPL-SCNC: 3.8 MMOL/L (ref 3.4–5.3)
SODIUM SERPL-SCNC: 138 MMOL/L (ref 136–145)

## 2023-01-23 PROCEDURE — 93306 TTE W/DOPPLER COMPLETE: CPT | Mod: 26 | Performed by: INTERNAL MEDICINE

## 2023-01-23 PROCEDURE — 36415 COLL VENOUS BLD VENIPUNCTURE: CPT | Performed by: INTERNAL MEDICINE

## 2023-01-23 PROCEDURE — 255N000002 HC RX 255 OP 636: Performed by: INTERNAL MEDICINE

## 2023-01-23 PROCEDURE — 999N000208 ECHOCARDIOGRAM COMPLETE

## 2023-01-23 PROCEDURE — 80048 BASIC METABOLIC PNL TOTAL CA: CPT | Performed by: INTERNAL MEDICINE

## 2023-01-23 RX ADMIN — HUMAN ALBUMIN MICROSPHERES AND PERFLUTREN 9 ML: 10; .22 INJECTION, SOLUTION INTRAVENOUS at 14:13

## 2023-01-30 ENCOUNTER — HOSPITAL ENCOUNTER (OUTPATIENT)
Dept: ULTRASOUND IMAGING | Facility: CLINIC | Age: 43
Discharge: HOME | End: 2023-01-30
Attending: INTERNAL MEDICINE | Admitting: INTERNAL MEDICINE
Payer: COMMERCIAL

## 2023-01-30 PROCEDURE — 93975 VASCULAR STUDY: CPT | Mod: 26 | Performed by: INTERNAL MEDICINE

## 2023-01-30 PROCEDURE — 93975 VASCULAR STUDY: CPT

## 2023-02-02 ENCOUNTER — OFFICE VISIT (OUTPATIENT)
Dept: CARDIOLOGY | Facility: CLINIC | Age: 43
End: 2023-02-02
Attending: INTERNAL MEDICINE
Payer: COMMERCIAL

## 2023-02-02 VITALS
HEIGHT: 65 IN | SYSTOLIC BLOOD PRESSURE: 118 MMHG | BODY MASS INDEX: 27.99 KG/M2 | HEART RATE: 83 BPM | DIASTOLIC BLOOD PRESSURE: 81 MMHG | OXYGEN SATURATION: 99 % | WEIGHT: 168 LBS

## 2023-02-02 DIAGNOSIS — Z13.220 SCREENING FOR HYPERLIPIDEMIA: ICD-10-CM

## 2023-02-02 DIAGNOSIS — I10 BENIGN ESSENTIAL HYPERTENSION: Primary | ICD-10-CM

## 2023-02-02 PROCEDURE — 99214 OFFICE O/P EST MOD 30 MIN: CPT | Performed by: INTERNAL MEDICINE

## 2023-02-02 RX ORDER — LOSARTAN POTASSIUM 50 MG/1
50 TABLET ORAL 2 TIMES DAILY
Qty: 200 TABLET | Refills: 5 | Status: SHIPPED | OUTPATIENT
Start: 2023-02-02 | End: 2023-03-21

## 2023-02-02 RX ORDER — LABETALOL 100 MG/1
100 TABLET, FILM COATED ORAL 2 TIMES DAILY
Qty: 200 TABLET | Refills: 4 | Status: SHIPPED | OUTPATIENT
Start: 2023-02-02 | End: 2023-08-28

## 2023-02-02 NOTE — PROGRESS NOTES
"  SERVICE DATE: 2/2/2023    REFERRING PROVIDER:  Tom Knight MD  [address] [place] [address]    PRIMARY CARE PROVIDER:  aMyda Dior  [address]  [facility] [address]    REASON FOR VISIT:  1.  Follow-up of hypertension after medication changes.    HISTORY OF PRESENT ILLNESS:  It was my pleasure to follow-up with Kelly was a delightful 42-year-old lady of [place] ancestry, employed as an insurance .    Her medical history is significant for pregnancy-induced hypertension and subsequent essential or benign hypertension.  BMI 28.  Never tobacco user.    The following issues were addressed today:    1.  Benign essential hypertension.  At her last visit I had optimized medications.  She is tolerating this well.  She is on labetalol 200 mg 2 times daily started by her OB/GYN provider a few years ago.  I had started on losartan and gradually uptitrated to 50 mg in the morning and 25 mg in the evening.  With this, her mild shortness of breath and blood pressure fluctuations have completely resolved.  BP today is 118/80 1 mmHg.  She is also essentially stopped all alcohol intake, regularly exercises, weight is stable at 168 pounds, no tobacco use, watches dietary sodium intake, no NSAID use.    I reviewed interval testing with the patient.    Renal panel is normal with a sodium of 138, potassium 3.8, creatinine 0.8.  Fasting glucose mildly elevated at 103.    I personally reviewed and independently interpreted echocardiogram images.  Normal left ventricular size and systolic function, LVEF 60 from 65%, normal diastolic function, normal wall thickness, normal right ventricular systolic function, no valve disease.    Reviewed renal ultrasound which shows no renal artery stenosis or evidence of fibromuscular dysplasia.      PHYSICAL EXAMINATION:  Vitals: /81   Pulse 83   Ht 1.638 m (5' 4.5\")   Wt 76.2 kg (168 lb)   SpO2 99%   BMI 28.39 kg/m      DIAGNOSES/ASSESSMENT:  1.  " Benign essential hypertension in the context of previous pregnancy-induced hypertension.      Excellent response to medications.  Do not prefer high-dose beta-blockade as an antihypertensive choice, especially in young people because it can cause chronotropic incompetence and exercise intolerance.  I am going to increase losartan and decrease labetalol.    PLAN:  1.  Medications today:  -- Increase losartan to 50 mg 2 times daily.  -- Decrease labetalol from 200 mg twice daily to 100 mg twice daily.  -- Prescriptions renewed.    2.  Patient has completed family and is using condoms for contraception.  Reiterated to the patient that if she gets pregnant, losartan should be discontinued immediately.  Losartan has teratogenic potential.      3.  Educated about continuing heart healthy lifestyle (regular daily exercise, minimizing dietary sodium and alcohol) as part of overall treatment of hypertension.    4.  Follow-up with me in 6 months with previsit screening fasting lipids.      Established patient. Total time today 30 minutes.    Tom Knight MD      Today's clinic visit entailed:  The level of medical decision making during this visit was of moderate complexity.        Encounter Diagnoses   Name Primary?     Benign essential hypertension Yes     Screening for hyperlipidemia          Orders Placed This Encounter   Procedures     Lipid Profile     Follow-Up with Cardiology           CURRENT MEDICATIONS:  Current Outpatient Medications   Medication Sig Dispense Refill     Acetaminophen (TYLENOL PO) Take by mouth as needed for mild pain or fever       albuterol (PROAIR HFA) 108 (90 Base) MCG/ACT inhaler Inhale 2 puffs into the lungs every 6 hours as needed for shortness of breath / dyspnea Due for office visit: [phone] 18 g 0     albuterol (PROVENTIL) (2.5 MG/3ML) 0.083% neb solution Take 1 vial (2.5 mg) by nebulization every 6 hours as needed for shortness of breath, wheezing or cough 90 mL 0      Cetirizine-Pseudoephedrine (ZYRTEC-D PO) Take by mouth daily        cyclobenzaprine (FLEXERIL) 10 MG tablet Take 1 tablet (10 mg) by mouth 3 times daily as needed for muscle spasms 20 tablet 0     fluticasone (FLONASE) 50 MCG/ACT nasal spray Spray 1-2 sprays into both nostrils daily 3 Package 3     fluticasone-salmeterol (ADVAIR) 100-50 MCG/ACT inhaler Inhale 1 puff into the lungs every 12 hours       labetalol (NORMODYNE) 100 MG tablet Take 1 tablet (100 mg) by mouth 2 times daily 200 tablet 4     losartan (COZAAR) 50 MG tablet Take 1 tablet (50 mg) by mouth 2 times daily Take 50 mg in the morning and 25 mg in the evening. 200 tablet 5     naproxen (NAPROSYN) 500 MG tablet Take 1 tablet (500 mg) by mouth 2 times daily (with meals) 30 tablet 0     Probiotic Product (PROBIOTIC PO)        sulindac (CLINORIL) 200 MG tablet Take 200 mg by mouth daily            ALLERGIES:  Allergies   Allergen Reactions     Amoxicillin Rash     Diclofenac Nausea and Vomiting     Keflex [Cephalexin Monohydrate] Rash     Sulfa Drugs Nausea and Vomiting     Animal Dander      Seasonal Allergies        PAST MEDICAL HISTORY:    Past Medical History:   Diagnosis Date     Arthritis      Cervical dysplasia      Condyloma acuminatum      Elevated liver enzymes 2012    felt due to fatty liver     Fibroids      Gastro-oesophageal reflux disease      H/O cold sores      HPV in female      Hx of previous reproductive problem     IVF     Hypertension     during pregnancy     Intermittent asthma     mostly with allergies     Migraines      Pelvic pain in female      PONV (postoperative nausea and vomiting)      Seasonal allergies      Sphincter of Oddi dysfunction     Dr. Ras Juárez, had ercp, eus, panc stents placed     Spondyloarthropathy        PAST SURGICAL HISTORY:    Past Surgical History:   Procedure Laterality Date      SECTION N/A 2018    Procedure:  SECTION;;  Surgeon: Crista Nicole MD;  Location:  [facility]     ENDOSCOPIC RETROGRADE CHOLANGIOPANCREATOGRAM  6/10/2014    Procedure: ENDOSCOPIC RETROGRADE CHOLANGIOPANCREATOGRAM;  Surgeon: Ras Juárez MD;  Location:  OR     ENDOSCOPIC RETROGRADE CHOLANGIOPANCREATOGRAPHY  11/12     ESOPHAGOSCOPY, GASTROSCOPY, DUODENOSCOPY (EGD), COMBINED  7/8/2014    Procedure: COMBINED ESOPHAGOSCOPY, GASTROSCOPY, DUODENOSCOPY (EGD), REMOVE FOREIGN BODY;  Surgeon: Gabbie Menjivar MD;  Location:  GI     [facility]      [facility]    Procedure: COMBINED ENDOSCOPIC ULTRASOUND, ESOPHAGOSCOPY, GASTROSCOPY, DUODENOSCOPY (EGD);  Surgeon: Gabbie Menjivar MD;  [facility]     LAPAROSCOPIC CHOLECYSTECTOMY  11/12     LAPAROSCOPIC MYOMECTOMY UTERUS N/A 10/30/2015    Procedure: LAPAROSCOPIC MYOMECTOMY UTERUS;  Surgeon: Mayda Dior MD;  Location:  OR       FAMILY HISTORY:    Family History   Adopted: Yes   Problem Relation Age of Onset     Unknown/Adopted Father        SOCIAL HISTORY:    Social History     Socioeconomic History     Marital status:      Spouse name: Janet     Number of children: 0   Occupational History     Occupation:      Employer: [employer]   Tobacco Use     Smoking status: Never     Smokeless tobacco: Never   Substance and Sexual Activity     Alcohol use: Yes     Alcohol/week: 3.0 standard drinks     Types: 3 Standard drinks or equivalent per week     Drug use: No     Sexual activity: Yes     Partners: Male

## 2023-02-02 NOTE — LETTER
2/2/2023    Mayda Dior MD  [address]  [facility] [address]    RE: Kelly Wright       Dear Colleague,     I had the pleasure of seeing Kelly Wright in the [facility].    SERVICE DATE: 2/2/2023    REFERRING PROVIDER:  Tom Knight MD  [address] [place] [address]    PRIMARY CARE PROVIDER:  Mayda Dior  [address]  [facility] [address]    REASON FOR VISIT:  1.  Follow-up of hypertension after medication changes.    HISTORY OF PRESENT ILLNESS:  It was my pleasure to follow-up with Kelly was a delightful 42-year-old lady of [place] ancestry, employed as an insurance .    Her medical history is significant for pregnancy-induced hypertension and subsequent essential or benign hypertension.  BMI 28.  Never tobacco user.    The following issues were addressed today:    1.  Benign essential hypertension.  At her last visit I had optimized medications.  She is tolerating this well.  She is on labetalol 200 mg 2 times daily started by her OB/GYN provider a few years ago.  I had started on losartan and gradually uptitrated to 50 mg in the morning and 25 mg in the evening.  With this, her mild shortness of breath and blood pressure fluctuations have completely resolved.  BP today is 118/80 1 mmHg.  She is also essentially stopped all alcohol intake, regularly exercises, weight is stable at 168 pounds, no tobacco use, watches dietary sodium intake, no NSAID use.    I reviewed interval testing with the patient.    Renal panel is normal with a sodium of 138, potassium 3.8, creatinine 0.8.  Fasting glucose mildly elevated at 103.    I personally reviewed and independently interpreted echocardiogram images.  Normal left ventricular size and systolic function, LVEF 60 from 65%, normal diastolic function, normal wall thickness, normal right ventricular systolic function, no valve disease.    Reviewed renal ultrasound which shows no renal artery  "stenosis or evidence of fibromuscular dysplasia.      PHYSICAL EXAMINATION:  Vitals: /81   Pulse 83   Ht 1.638 m (5' 4.5\")   Wt 76.2 kg (168 lb)   SpO2 99%   BMI 28.39 kg/m      DIAGNOSES/ASSESSMENT:  1.  Benign essential hypertension in the context of previous pregnancy-induced hypertension.      Excellent response to medications.  Do not prefer high-dose beta-blockade as an antihypertensive choice, especially in young people because it can cause chronotropic incompetence and exercise intolerance.  I am going to increase losartan and decrease labetalol.    PLAN:  1.  Medications today:  -- Increase losartan to 50 mg 2 times daily.  -- Decrease labetalol from 200 mg twice daily to 100 mg twice daily.  -- Prescriptions renewed.    2.  Patient has completed family and is using condoms for contraception.  Reiterated to the patient that if she gets pregnant, losartan should be discontinued immediately.  Losartan has teratogenic potential.      3.  Educated about continuing heart healthy lifestyle (regular daily exercise, minimizing dietary sodium and alcohol) as part of overall treatment of hypertension.    4.  Follow-up with me in 6 months with previsit screening fasting lipids.      Established patient. Total time today 30 minutes.    Tom Knight MD      Today's clinic visit entailed:  The level of medical decision making during this visit was of moderate complexity.        Encounter Diagnoses   Name Primary?     Benign essential hypertension Yes     Screening for hyperlipidemia          Orders Placed This Encounter   Procedures     Lipid Profile     Follow-Up with Cardiology           CURRENT MEDICATIONS:  Current Outpatient Medications   Medication Sig Dispense Refill     Acetaminophen (TYLENOL PO) Take by mouth as needed for mild pain or fever       albuterol (PROAIR HFA) 108 (90 Base) MCG/ACT inhaler Inhale 2 puffs into the lungs every 6 hours as needed for shortness of breath / dyspnea Due for " office visit: [phone] 18 g 0     albuterol (PROVENTIL) (2.5 MG/3ML) 0.083% neb solution Take 1 vial (2.5 mg) by nebulization every 6 hours as needed for shortness of breath, wheezing or cough 90 mL 0     Cetirizine-Pseudoephedrine (ZYRTEC-D PO) Take by mouth daily        cyclobenzaprine (FLEXERIL) 10 MG tablet Take 1 tablet (10 mg) by mouth 3 times daily as needed for muscle spasms 20 tablet 0     fluticasone (FLONASE) 50 MCG/ACT nasal spray Spray 1-2 sprays into both nostrils daily 3 Package 3     fluticasone-salmeterol (ADVAIR) 100-50 MCG/ACT inhaler Inhale 1 puff into the lungs every 12 hours       labetalol (NORMODYNE) 100 MG tablet Take 1 tablet (100 mg) by mouth 2 times daily 200 tablet 4     losartan (COZAAR) 50 MG tablet Take 1 tablet (50 mg) by mouth 2 times daily Take 50 mg in the morning and 25 mg in the evening. 200 tablet 5     naproxen (NAPROSYN) 500 MG tablet Take 1 tablet (500 mg) by mouth 2 times daily (with meals) 30 tablet 0     Probiotic Product (PROBIOTIC PO)        sulindac (CLINORIL) 200 MG tablet Take 200 mg by mouth daily            ALLERGIES:  Allergies   Allergen Reactions     Amoxicillin Rash     Diclofenac Nausea and Vomiting     Keflex [Cephalexin Monohydrate] Rash     Sulfa Drugs Nausea and Vomiting     Animal Dander      Seasonal Allergies        PAST MEDICAL HISTORY:    Past Medical History:   Diagnosis Date     Arthritis      Cervical dysplasia      Condyloma acuminatum      Elevated liver enzymes 09/2012    felt due to fatty liver     Fibroids      Gastro-oesophageal reflux disease      H/O cold sores      HPV in female      Hx of previous reproductive problem     IVF     Hypertension     during pregnancy     Intermittent asthma     mostly with allergies     Migraines 2007     Pelvic pain in female      PONV (postoperative nausea and vomiting)      Seasonal allergies      Sphincter of Oddi dysfunction 2014    Dr. Ras Juárez, had ercp, eus, panc stents placed      Spondyloarthropathy        PAST SURGICAL HISTORY:    Past Surgical History:   Procedure Laterality Date      SECTION N/A 2018    Procedure:  SECTION;;  Surgeon: Crista Nicole MD;  Location:  L+D     ENDOSCOPIC RETROGRADE CHOLANGIOPANCREATOGRAM  6/10/2014    Procedure: ENDOSCOPIC RETROGRADE CHOLANGIOPANCREATOGRAM;  Surgeon: Ras Juárez MD;  Location:  OR     ENDOSCOPIC RETROGRADE CHOLANGIOPANCREATOGRAPHY       ESOPHAGOSCOPY, GASTROSCOPY, DUODENOSCOPY (EGD), COMBINED  2014    Procedure: COMBINED ESOPHAGOSCOPY, GASTROSCOPY, DUODENOSCOPY (EGD), REMOVE FOREIGN BODY;  Surgeon: aGbbie Menjivar MD;  Location:  GI     GYN SURGERY      [facility] UGI ENDOSCOPY W EUS N/A 2014    Procedure: COMBINED ENDOSCOPIC ULTRASOUND, ESOPHAGOSCOPY, GASTROSCOPY, DUODENOSCOPY (EGD);  Surgeon: Gabbie Menjivar MD;  Location: [facility]     LAPAROSCOPIC CHOLECYSTECTOMY       LAPAROSCOPIC MYOMECTOMY UTERUS N/A 10/30/2015    Procedure: LAPAROSCOPIC MYOMECTOMY UTERUS;  Surgeon: Mayda Dior MD;  Location:  OR       FAMILY HISTORY:    Family History   Adopted: Yes   Problem Relation Age of Onset     Unknown/Adopted Father        SOCIAL HISTORY:    Social History     Socioeconomic History     Marital status:      Spouse name: Janet     Number of children: 0   Occupational History     Occupation:      Employer: [employer]   Tobacco Use     Smoking status: Never     Smokeless tobacco: Never   Substance and Sexual Activity     Alcohol use: Yes     Alcohol/week: 3.0 standard drinks     Types: 3 Standard drinks or equivalent per week     Drug use: No     Sexual activity: Yes     Partners: Male     Thank you for allowing me to participate in the care of your patient.      Sincerely,     Tom Knight MD     [facility]  cc:   Tom Knight MD  [address] [place] [address]   [place] [address]

## 2023-02-13 ENCOUNTER — OFFICE VISIT (OUTPATIENT)
Dept: FAMILY MEDICINE | Facility: CLINIC | Age: 43
End: 2023-02-13
Payer: COMMERCIAL

## 2023-02-13 VITALS
HEART RATE: 86 BPM | RESPIRATION RATE: 18 BRPM | HEIGHT: 65 IN | WEIGHT: 167.3 LBS | SYSTOLIC BLOOD PRESSURE: 136 MMHG | TEMPERATURE: 98.2 F | BODY MASS INDEX: 27.88 KG/M2 | OXYGEN SATURATION: 95 % | DIASTOLIC BLOOD PRESSURE: 86 MMHG

## 2023-02-13 DIAGNOSIS — J30.2 SEASONAL ALLERGIES: ICD-10-CM

## 2023-02-13 DIAGNOSIS — J45.20 MILD INTERMITTENT ASTHMA WITHOUT COMPLICATION: ICD-10-CM

## 2023-02-13 DIAGNOSIS — H93.8X1 PLUGGED FEELING IN EAR, RIGHT: Primary | ICD-10-CM

## 2023-02-13 DIAGNOSIS — H65.191 OTHER ACUTE NONSUPPURATIVE OTITIS MEDIA OF RIGHT EAR, RECURRENCE NOT SPECIFIED: ICD-10-CM

## 2023-02-13 PROCEDURE — 99214 OFFICE O/P EST MOD 30 MIN: CPT | Performed by: INTERNAL MEDICINE

## 2023-02-13 RX ORDER — AZITHROMYCIN 250 MG/1
TABLET, FILM COATED ORAL
Qty: 6 TABLET | Refills: 0 | Status: SHIPPED | OUTPATIENT
Start: 2023-02-13 | End: 2023-02-18

## 2023-02-13 ASSESSMENT — PAIN SCALES - GENERAL: PAINLEVEL: NO PAIN (0)

## 2023-02-13 NOTE — PROGRESS NOTES
"  Assessment & Plan   Problem List Items Addressed This Visit        Respiratory    Mild intermittent asthma without complication       Other    Seasonal allergies   Other Visit Diagnoses     Plugged feeling in ear, right    -  Primary    VIRAL SUSPECTED    Other acute nonsuppurative otitis media of right ear, recurrence not specified        Relevant Medications    azithromycin (ZITHROMAX) 250 MG tablet         History of recurrent colds over the last couple weeks more recently a week ago and Kelly went on travel.  She has findings of acute otitis media viral still suspected, probably fluid collection behind tympanic membrane is causing the plugged ear sensation.  Advised to use decongestants daily for at least the next 10 days including Flonase 2 puffs in each nostril, Zyrtec she is taking daily for allergies and saline rinses.  She can also try to open the eustachian tubes mechanically gently blowing on closing nostrils and gently blowing through closed lips to help open eustachian tube .  She might have underlying element of eustachian tube dysfunction as well.  We elected to give a course of antibiotics with Z-Irwin given the redness of the tympanic membrane the plugging sensation she does not have acute ear pain per se, it might progress to an acute bacterial  otitis or we might be catching a resolving otitis media.  If symptoms worsen patient to notify us, I would be cautious using Sudafed she does have hypertension blood pressure slightly elevated and Sudafed can increase blood pressure and heart rate; avoid or use it to keep it to minimal use.         BMI:   Estimated body mass index is 28.27 kg/m  as calculated from the following:    Height as of this encounter: 1.638 m (5' 4.5\").    Weight as of this encounter: 75.9 kg (167 lb 4.8 oz).       See Patient Instructions    No follow-ups on file.    Debbie Olson MD  [facility] BRANDAN Mendoza is a 42 year old, presenting for " "the following health issues:  Ear Problem (Pt believes right ear is clogged)      HPI     Patient presenting for evaluation of right ear plugging sensation.  She had on and off cold symptoms over the last 3 weeks.  She was traveling.  She takes Zyrtec daily and Flonase for history of allergies.  She has multiple environmental allergies.  Currently she denies any sore throat ear pain headache fever.  She does have some ongoing congestion.  She had bronchitis in December and she was sick as multiple worse over the winter season.  History of asthma.  She had upper respiratory symptoms during November and December with asthma exacerbation.    Review of Systems   Constitutional, HEENT, cardiovascular, pulmonary, gi and gu systems are negative, except as otherwise noted.      Objective    /86 (BP Location: Right arm, Patient Position: Sitting, Cuff Size: Adult Regular)   Pulse 86   Temp 98.2  F (36.8  C) (Temporal)   Resp 18   Ht 1.638 m (5' 4.5\")   Wt 75.9 kg (167 lb 4.8 oz)   SpO2 95%   BMI 28.27 kg/m    Body mass index is 28.27 kg/m .  Physical Exam   GEN appears not in acute distress alert oriented x3  Head and neck : intact cranial nerves.  Nostril mucosa inflamed red with some white mucus; left more than right.  No sinus direct tenderness.  Right ear tympanic membrane slightly bulging and some redness/erythema over the tympanic membrane most at the superior aspect.  The membrane does not move with insufflation of air through the nostrils.  No mastoid tenderness.  Left ear canal impacted with wax which we removed with a , tympanic membrane is clear and shiny.  Oral cavity minimal erythema, no cobblestoning, no swelling.  Neck: no cervical lymphadenopathy or lumps.  Lungs clear to auscultation    Lab on 01/23/2023   Component Date Value Ref Range Status     Sodium 01/23/2023 138  136 - 145 mmol/L Final     Potassium 01/23/2023 3.8  3.4 - 5.3 mmol/L Final     Chloride 01/23/2023 103  98 - 107 mmol/L " Final     Carbon Dioxide (CO2) 01/23/2023 26  22 - 29 mmol/L Final     Anion Gap 01/23/2023 9  7 - 15 mmol/L Final     Urea Nitrogen 01/23/2023 16.9  6.0 - 20.0 mg/dL Final     Creatinine 01/23/2023 0.89  0.51 - 0.95 mg/dL Final     Calcium 01/23/2023 9.4  8.6 - 10.0 mg/dL Final     Glucose 01/23/2023 103 (H)  70 - 99 mg/dL Final     GFR Estimate 01/23/2023 83  >60 mL/min/1.73m2 Final    eGFR calculated using 2021 CKD-EPI equation.

## 2023-03-21 ENCOUNTER — TELEPHONE (OUTPATIENT)
Dept: CARDIOLOGY | Facility: CLINIC | Age: 43
End: 2023-03-21
Payer: COMMERCIAL

## 2023-03-21 DIAGNOSIS — I10 BENIGN ESSENTIAL HYPERTENSION: ICD-10-CM

## 2023-03-21 RX ORDER — LOSARTAN POTASSIUM 50 MG/1
50 TABLET ORAL 2 TIMES DAILY
Qty: 180 TABLET | Refills: 4 | Status: SHIPPED | OUTPATIENT
Start: 2023-03-21 | End: 2023-08-28

## 2023-03-21 NOTE — TELEPHONE ENCOUNTER
[facility] Call Center    Phone Message    May a detailed message be left on voicemail: yes     Reason for Call: Medication Refill Request    Has the patient contacted the pharmacy for the refill? Yes   Name of medication being requested: losartan (COZAAR) 50 MG tablet   2 Tabs 2 times daily  Provider who prescribed the medication: Tom Knight MD  Pharmacy: [id number] - [place], [place] - [address]  Date medication is needed: ASAP      Pt stated she is out and pharmacy will not fill because of dosage difference.    Action Taken: Other: cardio    Travel Screening: Not Applicable     Thank you!  [facility]

## 2023-03-21 NOTE — TELEPHONE ENCOUNTER
"Last visit 2/2/23 w/ Dr Knight-  \"1.  Medications today:  -- Increase losartan to 50 mg 2 times daily.  -- Decrease labetalol from 200 mg twice daily to 100 mg twice daily.  -- Prescriptions renewed.\"      Spoke to patient who states she is taking 50mg BID as prescribed, but some of the confusion may be due to the fact that at one point she was using 25mg tablets. Updated Rx sent to pharmacy. She will call back if any issues.   "

## 2023-03-24 ENCOUNTER — NURSE TRIAGE (OUTPATIENT)
Dept: FAMILY MEDICINE | Facility: CLINIC | Age: 43
End: 2023-03-24
Payer: COMMERCIAL

## 2023-03-24 NOTE — TELEPHONE ENCOUNTER
Son tested positive for Strep   Has a sore throat also  Does not have a PCP      Recommended evisit or VV or UC visit to test for strep, nothing available with any of the [facility] providers today     Pt transferred to central scheduler to see if any provider has availability     Lindsey ABRAHAM Triage RN  [facility]       Reason for Disposition    Patient requesting a strep throat test    Additional Information    Negative: SEVERE difficulty breathing (e.g., struggling for each breath, speaks in single words)    Negative: Sounds like a life-threatening emergency to the triager    Negative: Throat culture results, call about    Negative: Productive cough is main symptom    Negative: Runny nose is main symptom    Negative: Drooling or spitting out saliva (because can't swallow)    Negative: Unable to open mouth completely    Negative: Drinking very little and has signs of dehydration (e.g., no urine > 12 hours, very dry mouth, very lightheaded)    Negative: Patient sounds very sick or weak to the triager    Negative: Difficulty breathing (per caller) but not severe    Negative: Fever > 103 F (39.4 C)    Negative: Refuses to drink anything for > 12 hours    Negative: SEVERE sore throat pain    Negative: Pus on tonsils (back of throat) and swollen neck lymph nodes ('glands')    Negative: Earache also present    Negative: Widespread rash (especially chest and abdomen)    Negative: Diabetes mellitus or weak immune system (e.g., HIV positive, cancer chemo, splenectomy, organ transplant, chronic steroids)    Negative: History of rheumatic fever    Negative: Patient wants to be seen    Negative: Fever present > 3 days (72 hours)    Protocols used: SORE THROAT-A-OH

## 2023-03-25 ENCOUNTER — HEALTH MAINTENANCE LETTER (OUTPATIENT)
Age: 43
End: 2023-03-25

## 2023-03-30 DIAGNOSIS — Z13.220 SCREENING FOR LIPOID DISORDERS: ICD-10-CM

## 2023-03-30 DIAGNOSIS — Z13.1 SCREENING FOR DIABETES MELLITUS: Primary | ICD-10-CM

## 2023-05-15 ENCOUNTER — OFFICE VISIT (OUTPATIENT)
Dept: FAMILY MEDICINE | Facility: CLINIC | Age: 43
End: 2023-05-15
Payer: COMMERCIAL

## 2023-05-15 VITALS
DIASTOLIC BLOOD PRESSURE: 96 MMHG | SYSTOLIC BLOOD PRESSURE: 145 MMHG | RESPIRATION RATE: 16 BRPM | BODY MASS INDEX: 28 KG/M2 | OXYGEN SATURATION: 99 % | HEART RATE: 93 BPM | WEIGHT: 164 LBS | TEMPERATURE: 98.8 F | HEIGHT: 64 IN

## 2023-05-15 DIAGNOSIS — L23.9 ALLERGIC DERMATITIS: Primary | ICD-10-CM

## 2023-05-15 PROCEDURE — 99213 OFFICE O/P EST LOW 20 MIN: CPT | Performed by: INTERNAL MEDICINE

## 2023-05-15 RX ORDER — TRIAMCINOLONE ACETONIDE 1 MG/G
CREAM TOPICAL 3 TIMES DAILY PRN
Qty: 45 G | Refills: 0 | Status: SHIPPED | OUTPATIENT
Start: 2023-05-15 | End: 2023-08-28

## 2023-05-15 RX ORDER — METHYLPREDNISOLONE 4 MG
TABLET, DOSE PACK ORAL
Qty: 21 TABLET | Refills: 0 | Status: SHIPPED | OUTPATIENT
Start: 2023-05-15 | End: 2023-08-28

## 2023-05-15 ASSESSMENT — ASTHMA QUESTIONNAIRES
ACT_TOTALSCORE: 22
QUESTION_1 LAST FOUR WEEKS HOW MUCH OF THE TIME DID YOUR ASTHMA KEEP YOU FROM GETTING AS MUCH DONE AT WORK, SCHOOL OR AT HOME: NONE OF THE TIME
QUESTION_5 LAST FOUR WEEKS HOW WOULD YOU RATE YOUR ASTHMA CONTROL: WELL CONTROLLED
QUESTION_4 LAST FOUR WEEKS HOW OFTEN HAVE YOU USED YOUR RESCUE INHALER OR NEBULIZER MEDICATION (SUCH AS ALBUTEROL): ONCE A WEEK OR LESS
QUESTION_2 LAST FOUR WEEKS HOW OFTEN HAVE YOU HAD SHORTNESS OF BREATH: ONCE OR TWICE A WEEK
QUESTION_3 LAST FOUR WEEKS HOW OFTEN DID YOUR ASTHMA SYMPTOMS (WHEEZING, COUGHING, SHORTNESS OF BREATH, CHEST TIGHTNESS OR PAIN) WAKE YOU UP AT NIGHT OR EARLIER THAN USUAL IN THE MORNING: NOT AT ALL
ACT_TOTALSCORE: 22

## 2023-05-15 ASSESSMENT — PAIN SCALES - GENERAL: PAINLEVEL: NO PAIN (0)

## 2023-05-15 NOTE — PROGRESS NOTES
"  Assessment & Plan     Allergic dermatitis  Presentation is that of atopic dermatitis.  She does recall some metal earrings that may have set this off.  She is having persistent symptoms despite oral antihistamine, Flonase, and as needed cortisone cream.    She has had steroid tapers in the past and I believe this would be a reasonable option at this time.  She will hold her prescription NSAID for the duration.  We can also furnish her with a prescription for triamcinolone cream.  Risks and side effects along with areas to avoid with the cream are discussed.  - methylPREDNISolone (MEDROL DOSEPAK) 4 MG tablet therapy pack  Dispense: 21 tablet; Refill: 0  - triamcinolone (KENALOG) 0.1 % external cream  Dispense: 45 g; Refill: 0          BMI:   Estimated body mass index is 28.15 kg/m  as calculated from the following:    Height as of this encounter: 1.626 m (5' 4\").    Weight as of this encounter: 74.4 kg (164 lb).         Kane Garber MD  [facility] BRANDAN Mendoza is a 42 year old, presenting for the following health issues:  Derm Problem (Itching around neck area)         View : No data to display.              HPI     Pt is new to me    I'm having bad allergy issues!    Pt with year round allergies but worse in certain seasons, specifically Spring.  She notes itching on eyelids, ears, neck line.      She's not changed makeup, soap, shampoo detergent etc.      At present she is taking Flonase and Zyrtec- uses Pataday eye drops also.  She also used cortisone cream with short term relief.            Review of Systems         Objective    BP (!) 145/96 (BP Location: Right arm, Patient Position: Sitting, Cuff Size: Adult Regular)   Pulse 93   Temp 98.8  F (37.1  C) (Tympanic)   Resp 16   Ht 1.626 m (5' 4\")   Wt 74.4 kg (164 lb)   SpO2 99%   BMI 28.15 kg/m    Body mass index is 28.15 kg/m .  Physical Exam   GEN NAD  SKIN:  Mild scalp flaking.  Diffuse mild erythema upper cheeks " w/o urticaria.    ENT:  MMM, TM clear B

## 2023-05-16 NOTE — PROGRESS NOTES
Dictation reference number - [id number]    REFERRING PROVIDER:  No referring provider defined for this encounter.    PRIMARY CARE PROVIDER:  Errol Delcid  [address] [facility] [address]  [facility] [address]            REVIEW OF SYSTEMS:  A comprehensive 10-point review of systems was completed and the pertinent positives are documented in the history of present illness.    Skin:  Negative     Eyes:  Positive for contacts  ENT:  Negative    Respiratory:  Negative    Cardiovascular:  Negative    Gastroenterology: Negative    Genitourinary:  Negative    Musculoskeletal:  Positive for joint pain;back pain  Neurologic:  Positive for numbness or tingling of hands  Psychiatric:  Negative    Heme/Lymph/Imm:  Negative    Endocrine:  Negative      CURRENT MEDICATIONS:  Current Outpatient Prescriptions   Medication Sig Dispense Refill     albuterol (PROAIR HFA) 108 (90 BASE) MCG/ACT Inhaler Inhale 2 puffs into the lungs every 6 hours as needed for shortness of breath / dyspnea Due for office visit: [phone] 1 Inhaler 1     Aspirin (ASPIR-81 PO) Take 81 mg by mouth daily       ASTHMA CONTROLLER MEDICATION NOT PRESCRIBED, INTENTIONAL, Asthma controller medication not prescribed intentionally due to Other:not needed 0 each 0     Cetirizine-Pseudoephedrine (ZYRTEC-D PO) Take by mouth daily        fluticasone (FLONASE) 50 MCG/ACT nasal spray Spray 1-2 sprays into both nostrils daily 3 Package 3     LABETALOL HCL PO Take 500 mg by mouth 2 times daily       NIFEdipine ER (ADALAT CC) 90 MG TB24 Take 90 mg by mouth daily       Prenatal Vit-Fe Fumarate-FA (PRENATAL MULTIVITAMIN PLUS IRON) 27-0.8 MG TABS per tablet Take 2 tablets by mouth 2 times daily       Probiotic Product (PROBIOTIC PO)            ALLERGIES:  Allergies   Allergen Reactions     Amoxicillin Rash     Animal Dander      Diclofenac Nausea and Vomiting     Keflex [Cephalexin Monohydrate] Rash     Seasonal Allergies        PAST MEDICAL HISTORY:    Past Medical History:    Diagnosis Date     Abdominal pain 9/12    elev lft's, then ruq us fatty liver, endoscopic us done 10/12 and dilated ducts.     Arthritis      Cervical dysplasia      Condyloma acuminatum      Elevated liver enzymes 9/12    felt due to fatty liver     Fatty liver 9/12     Fibroids      Gastro-oesophageal reflux disease      H/O cold sores      HPV in female      Intermittent asthma     mostly with allergies     Joint pain 2011    Dr. Dickson     Migraine      Migraines 2007     Pancreatic disease      Pelvic pain in female      PONV (postoperative nausea and vomiting)      Seasonal allergies      Sphincter of Oddi dysfunction 2014    Dr. Ras Juárez, had ercp, eus, panc stents placed     Spondyloarthropathy (H)      Urinary frequency        PAST SURGICAL HISTORY:    Past Surgical History:   Procedure Laterality Date     ENDOSCOPIC RETROGRADE CHOLANGIOPANCREATOGRAM  6/10/2014    Procedure: ENDOSCOPIC RETROGRADE CHOLANGIOPANCREATOGRAM;  Surgeon: Ras Juárez MD;  Location:  OR     ENDOSCOPIC RETROGRADE CHOLANGIOPANCREATOGRAPHY  11/12     ESOPHAGOSCOPY, GASTROSCOPY, DUODENOSCOPY (EGD), COMBINED  7/8/2014    Procedure: COMBINED ESOPHAGOSCOPY, GASTROSCOPY, DUODENOSCOPY (EGD), REMOVE FOREIGN BODY;  Surgeon: Gabbie Menjivar MD;  Location:  GI     GYN SURGERY      LEEP     [facility] UGI ENDOSCOPY W EUS N/A 5/20/2014    Procedure: COMBINED ENDOSCOPIC ULTRASOUND, ESOPHAGOSCOPY, GASTROSCOPY, DUODENOSCOPY (EGD);  Surgeon: Gabbie Menjivar MD;  Location: [facility]     LAPAROSCOPIC CHOLECYSTECTOMY  11/12     LAPAROSCOPIC MYOMECTOMY UTERUS N/A 10/30/2015    Procedure: LAPAROSCOPIC MYOMECTOMY UTERUS;  Surgeon: Mayda Dior MD;  Location:  OR       FAMILY HISTORY:    Family History   Problem Relation Age of Onset     Unknown/Adopted Father        SOCIAL HISTORY:    Social History     Social History     Marital status:      Spouse name: Janet     Number of children: 0     Years of education: N/A  "    Occupational History      [employer]     Social History Main Topics     Smoking status: Never Smoker     Smokeless tobacco: Never Used     Alcohol use 1.8 oz/week     3 Standard drinks or equivalent per week      Comment: 3-5 a week     Drug use: No     Sexual activity: Yes     Partners: Male     Other Topics Concern     None     Social History Narrative       PHYSICAL EXAM:    Vitals: /83  Pulse 80  Ht 1.626 m (5' 4\")  Wt 77.5 kg (170 lb 12.8 oz)  LMP 11/19/2017  SpO2 96%  BMI 29.32 kg/m2  Wt Readings from Last 5 Encounters:   06/28/18 77.5 kg (170 lb 12.8 oz)   12/21/17 72.1 kg (159 lb)   12/05/17 73.5 kg (162 lb)   04/25/17 73.5 kg (162 lb)   01/17/17 74.4 kg (164 lb)       Constitutional: Comfortable at rest. Cooperative, alert and oriented, well developed, well nourished.  Eyes: Pupils equal and round, conjunctivae and lids unremarkable, sclera white, no xanthalasma,   ENT: Satisfactory dentition.  No cyanosis or pallor.  Neck: Carotid pulses are full and equal bilaterally, no carotid bruit, no thyromegaly     Respiratory: Normal respiratory effort with symmetrical chest wall movements and no use of accessory muscles. Good air entry with normal breath sounds and no rales or wheeze.  Cardiovascular: Normal jugular venous pulse and pressure.  Normal carotid pulse character and volume.  No carotid bruit.  Apical impulse is undisplaced and normal to palpation without parasternal heave or thrill.  Heart sounds are normal and regular. No audible murmur. No S3, S4 or friction rub.    GI: Soft, nontender, no hepatosplenomegaly, no masses, active bowel sounds.    Skin: No rash, erythema, ecchymosis.  Musculoskeletal: Normal muscle tone and strength. Normal gait. No spinal deformities.  Neuropsychiatric: Oriented to time place and person.  Affect normal.  No gross motor deficits.  Extremities: No edema. No clubbing or deformities.        Encounter Diagnoses   Name Primary? "     Elevated blood pressure reading without diagnosis of hypertension Yes     27 weeks gestation of pregnancy        Orders Placed This Encounter   Procedures     Comprehensive metabolic panel     TSH with free T4 reflex     CBC with platelets differential     Follow-Up with Cardiologist     EKG 12-lead complete w/read - Clinics     24 Hour Blood Pressure Monitor - Adult     Echocardiogram       CC  REFERRING PROVIDER:  No referring provider defined for this encounter.                 PAST SURGICAL HISTORY:  Colostomy care     H/O melanoma excision     S/P CABG (coronary artery bypass graft) ?    S/P implantation of automatic cardioverter/defibrillator (AICD)

## 2023-07-31 ENCOUNTER — OFFICE VISIT (OUTPATIENT)
Dept: INTERNAL MEDICINE | Facility: CLINIC | Age: 43
End: 2023-07-31
Payer: COMMERCIAL

## 2023-07-31 ENCOUNTER — NURSE TRIAGE (OUTPATIENT)
Dept: FAMILY MEDICINE | Facility: CLINIC | Age: 43
End: 2023-07-31

## 2023-07-31 VITALS
DIASTOLIC BLOOD PRESSURE: 78 MMHG | HEIGHT: 64 IN | SYSTOLIC BLOOD PRESSURE: 126 MMHG | RESPIRATION RATE: 16 BRPM | WEIGHT: 164.6 LBS | HEART RATE: 87 BPM | TEMPERATURE: 99 F | OXYGEN SATURATION: 99 % | BODY MASS INDEX: 28.1 KG/M2

## 2023-07-31 DIAGNOSIS — J02.9 SORE THROAT: ICD-10-CM

## 2023-07-31 DIAGNOSIS — J02.0 STREPTOCOCCAL PHARYNGITIS: Primary | ICD-10-CM

## 2023-07-31 LAB — DEPRECATED S PYO AG THROAT QL EIA: POSITIVE

## 2023-07-31 PROCEDURE — 99213 OFFICE O/P EST LOW 20 MIN: CPT | Performed by: PHYSICIAN ASSISTANT

## 2023-07-31 PROCEDURE — 87880 STREP A ASSAY W/OPTIC: CPT | Performed by: PHYSICIAN ASSISTANT

## 2023-07-31 PROCEDURE — 87635 SARS-COV-2 COVID-19 AMP PRB: CPT | Performed by: PHYSICIAN ASSISTANT

## 2023-07-31 RX ORDER — AZITHROMYCIN 250 MG/1
TABLET, FILM COATED ORAL
Qty: 6 TABLET | Refills: 0 | Status: SHIPPED | OUTPATIENT
Start: 2023-07-31 | End: 2023-08-04

## 2023-07-31 RX ORDER — LIDOCAINE HYDROCHLORIDE 20 MG/ML
15 SOLUTION OROPHARYNGEAL
Qty: 100 ML | Refills: 1 | Status: SHIPPED | OUTPATIENT
Start: 2023-07-31 | End: 2023-08-28

## 2023-07-31 NOTE — TELEPHONE ENCOUNTER
CC: Patient calling requesting a strep test - having sore throat    Sore Throat:    ONSET: 3 days ago   SEVERITY: 6/10  STREP EXPOSURE: denies - son in  - son does not have strep currently but strep has been going around frequently at    VIRAL SYMPTOMS: denies cough or runny nose. Does report headache and earache - mostly pressure on both sides   FEVER: denies   PUS ON THE TONSILS: denies   OTHER SYMPTOMS: denies difficulty breathing or rash. Does report headache.     Triaged per [facility] protocol, patient to be seen in office today. No appointments available today in [facility]. Patient is willing to be seen at [facility], writer scheduled patient. Patient will wear mask to appt.     7/31/2023 10:30 AM (Arrive by 10:10 AM) Kymberly Narvaez PA-C [facility]     No further questions or concerns at this time.    Signing encounter.    Guero Linder RN  [facility]    Reason for Disposition   Earache also present    Additional Information   Negative: SEVERE difficulty breathing (e.g., struggling for each breath, speaks in single words)   Negative: Sounds like a life-threatening emergency to the triager   Negative: Throat culture results, call about   Negative: Productive cough is main symptom   Negative: Runny nose is main symptom   Negative: Drooling or spitting out saliva (because can't swallow)   Negative: Unable to open mouth completely   Negative: Drinking very little and has signs of dehydration (e.g., no urine > 12 hours, very dry mouth, very lightheaded)   Negative: Patient sounds very sick or weak to the triager   Negative: Difficulty breathing (per caller) but not severe   Negative: Fever > 103 F (39.4 C)   Negative: Refuses to drink anything for > 12 hours   Negative: SEVERE sore throat pain   Negative: Pus on tonsils (back of throat) and swollen neck lymph nodes ('glands')    Protocols used: Sore Throat-A-OH

## 2023-07-31 NOTE — PROGRESS NOTES
"  Assessment & Plan     Streptococcal pharyngitis    - azithromycin (ZITHROMAX) 250 MG tablet; Two tablets first day, then one tablet daily for four days.    Sore throat    - Streptococcus A Rapid Screen w/Reflex to PCR - Clinic Collect  - Symptomatic COVID-19 Virus (Coronavirus) by PCR Nose  - lidocaine, viscous, (XYLOCAINE) 2 % solution; Swish and spit 15 mLs in mouth every 3 hours as needed for pain ; Max 8 doses/24 hour period.             BMI:   Estimated body mass index is 28.25 kg/m  as calculated from the following:    Height as of this encounter: 1.626 m (5' 4\").    Weight as of this encounter: 74.7 kg (164 lb 9.6 oz).   Weight management plan: Patient was referred to their PCP to discuss a diet and exercise plan.    Fluids and rest      ELPIDIO [facility] L[facility]SYD[facility] HospitalEDMOND Mendoza is a 43 year old, presenting for the following health issues:  Pharyngitis      HPI     Acute Illness  Acute illness concerns: Sore throat   Onset/Duration: 3 days  Symptoms:  Fever: No  Chills/Sweats: No  Headache (location?): YES  Sinus Pressure: No  Conjunctivitis:  No  Ear Pain: YES- Only when swallowing  Rhinorrhea: YES  Congestion: YES  Sore Throat: YES  Cough: no  Wheeze: No  Decreased Appetite: No  Nausea: No  Vomiting: No  Diarrhea: No  Dysuria/Freq.: No  Dysuria or Hematuria: No  Fatigue/Achiness: No  Sick/Strep Exposure:son in   Therapies tried and outcome: None        Review of Systems         Objective    /78   Pulse 87   Temp 99  F (37.2  C) (Tympanic)   Resp 16   Ht 1.626 m (5' 4\")   Wt 74.7 kg (164 lb 9.6 oz)   LMP 07/02/2023 (Approximate)   SpO2 99%   BMI 28.25 kg/m    Body mass index is 28.25 kg/m .  Physical Exam   GENERAL: healthy, alert and no distress  HENT: normal cephalic/atraumatic, ear canals and TM's normal, nose and mouth without ulcers or lesions, oral mucous membranes moist, and tonsillar erythema  NECK: no adenopathy, no " asymmetry, masses, or scars and thyroid normal to palpation  RESP: lungs clear to auscultation - no rales, rhonchi or wheezes  CV: regular rates and rhythm and normal S1 S2, no S3 or S4    Results for orders placed or performed in visit on 07/31/23 (from the past 24 hour(s))   Streptococcus A Rapid Screen w/Reflex to PCR - Clinic Collect    Specimen: Throat; Swab   Result Value Ref Range    Group A Strep antigen Positive (A) Negative

## 2023-08-01 LAB — SARS-COV-2 RNA RESP QL NAA+PROBE: NEGATIVE

## 2023-08-21 ENCOUNTER — TRANSFERRED RECORDS (OUTPATIENT)
Dept: HEALTH INFORMATION MANAGEMENT | Facility: CLINIC | Age: 43
End: 2023-08-21
Payer: COMMERCIAL

## 2023-08-21 LAB
ALT SERPL-CCNC: 25 IU/L (ref 5–35)
AST SERPL-CCNC: 26 U/L (ref 5–34)
CREATININE (EXTERNAL): 0.87 MG/DL (ref 0.5–1.3)
GFR ESTIMATED (EXTERNAL): 75.5 ML/MIN/1.73M2

## 2023-08-28 ENCOUNTER — OFFICE VISIT (OUTPATIENT)
Dept: CARDIOLOGY | Facility: CLINIC | Age: 43
End: 2023-08-28
Attending: INTERNAL MEDICINE
Payer: COMMERCIAL

## 2023-08-28 ENCOUNTER — LAB (OUTPATIENT)
Dept: LAB | Facility: CLINIC | Age: 43
End: 2023-08-28
Payer: COMMERCIAL

## 2023-08-28 VITALS
DIASTOLIC BLOOD PRESSURE: 96 MMHG | HEART RATE: 71 BPM | WEIGHT: 165 LBS | SYSTOLIC BLOOD PRESSURE: 136 MMHG | HEIGHT: 65 IN | BODY MASS INDEX: 27.49 KG/M2 | OXYGEN SATURATION: 100 %

## 2023-08-28 DIAGNOSIS — I10 BENIGN ESSENTIAL HYPERTENSION: ICD-10-CM

## 2023-08-28 DIAGNOSIS — Z13.220 SCREENING FOR HYPERLIPIDEMIA: ICD-10-CM

## 2023-08-28 LAB
CHOLEST SERPL-MCNC: 141 MG/DL
HDLC SERPL-MCNC: 48 MG/DL
LDLC SERPL CALC-MCNC: 74 MG/DL
NONHDLC SERPL-MCNC: 93 MG/DL
TRIGL SERPL-MCNC: 93 MG/DL

## 2023-08-28 PROCEDURE — 80061 LIPID PANEL: CPT | Performed by: INTERNAL MEDICINE

## 2023-08-28 PROCEDURE — 99214 OFFICE O/P EST MOD 30 MIN: CPT | Performed by: INTERNAL MEDICINE

## 2023-08-28 PROCEDURE — 36415 COLL VENOUS BLD VENIPUNCTURE: CPT | Performed by: INTERNAL MEDICINE

## 2023-08-28 RX ORDER — LOSARTAN POTASSIUM 50 MG/1
50 TABLET ORAL 2 TIMES DAILY
Qty: 200 TABLET | Refills: 6 | Status: SHIPPED | OUTPATIENT
Start: 2023-08-28 | End: 2024-06-05

## 2023-08-28 RX ORDER — GABAPENTIN 300 MG/1
600 CAPSULE ORAL DAILY
COMMUNITY

## 2023-08-28 RX ORDER — ATENOLOL 25 MG/1
25 TABLET ORAL EVERY MORNING
Qty: 30 TABLET | Refills: 6 | Status: SHIPPED | OUTPATIENT
Start: 2023-08-28 | End: 2023-08-29

## 2023-08-28 NOTE — PROGRESS NOTES
SERVICE DATE: 8/28/2023    PRIMARY CARE PROVIDER:  Mayda Dior  [address]  [facility] [address]    REASON FOR VISIT:  Follow-up of hypertension after medication changes.     HISTORY OF PRESENT ILLNESS:  It was my pleasure to follow-up with Kelly who is a delightful 43-year-old lady of [place] ancestry, employed as an insurance .      Her medical history is significant for pregnancy-induced hypertension and subsequent benign hypertension with negative work-up for secondary hypertension, structurally normal heart on echocardiogram without concentric hypertrophy, history of migraine headaches, mild intermittent asthma, arthritis and GERD.  BMI 28.  Never tobacco user.    At her recent visit with me on 2/2/2023, I had optimized her antihypertensive regimen by increasing losartan to 50 mg 2 times daily, decreasing labetalol from 200 mg twice daily to 100 mg twice daily.  She has tolerated these changes well. Home blood pressures are excellent in the 120s/70s.  Blood pressure in clinic today slightly elevated 136/96 with a pulse of 71 bpm.  Recently struggling with neuropathic pains in her neck and arm and is on gabapentin.  Does high intensity interval training 3-4 times a week, has cut back on dietary sodium, social alcohol intake, no tobacco or recreational drug use.    Normal renal panel with potassium of 3.8, sodium 138, creatinine 0.89.  Lipids from this morning pending.  Normal CBC.    Reviewed echocardiogram dated 1/23/2023.  Structurally normal heart.    DIAGNOSES/ASSESSMENT:  1.  Benign essential hypertension.  Continue losartan, switch from labetalol to atenolol for once daily dosing and regular antihypertensive effect.  Congratulated her on her healthy lifestyle.  2.  History of pregnancy-induced hypertension with first pregnancy at age 38 years.  Kelly has completed her family, is using condoms for contraception and is aware that if she gets pregnant, losartan should be  "discontinued immediately due to teratogenic potential.    PLAN:  1.  Medications:  -- Continue losartan 50 mg 2 times daily.  -- Stop labetalol 100 mg 3 times daily.  -- Start atenolol 25 mg daily from tomorrow.  Counseled patient about potential side effects.  -- Goal blood pressures 130/80 or below.  If it remains over that, will add low-dose amlodipine.    2.  Follow-up with me in 4 months.      Tom Knight MD      Established patient.   30 minutes spent by me on the date of the encounter doing chart review, history and exam, documentation and further activities per the note.        PHYSICAL EXAMINATION:  Vitals: BP (!) 136/96   Pulse 71   Ht 1.638 m (5' 4.5\")   Wt 74.8 kg (165 lb)   LMP 07/02/2023 (Approximate)   SpO2 100%   BMI 27.88 kg/m    Wt Readings from Last 5 Encounters:   08/28/23 74.8 kg (165 lb)   07/31/23 74.7 kg (164 lb 9.6 oz)   05/15/23 74.4 kg (164 lb)   02/13/23 75.9 kg (167 lb 4.8 oz)   02/02/23 76.2 kg (168 lb)     CARDIOVASCULAR:  Regular heart sounds.  No murmur. No carotid bruit.  RESPIRATORY:  Normal breath sounds. No rales or wheeze.  EXTREMITIES:  No edema.    Encounter Diagnosis   Name Primary?    Benign essential hypertension          Orders Placed This Encounter   Procedures    Follow-Up with Cardiology           CURRENT MEDICATIONS:  Current Outpatient Medications   Medication Sig Dispense Refill    albuterol (PROAIR HFA) 108 (90 Base) MCG/ACT inhaler Inhale 2 puffs into the lungs every 6 hours as needed for shortness of breath / dyspnea Due for office visit: [phone] 18 g 0    albuterol (PROVENTIL) (2.5 MG/3ML) 0.083% neb solution Take 1 vial (2.5 mg) by nebulization every 6 hours as needed for shortness of breath, wheezing or cough 90 mL 0    atenolol (TENORMIN) 25 MG tablet Take 1 tablet (25 mg) by mouth every morning 30 tablet 6    Cetirizine-Pseudoephedrine (ZYRTEC-D PO) Take by mouth daily       fluticasone (FLONASE) 50 MCG/ACT nasal spray Spray 1-2 sprays " into both nostrils daily 3 Package 3    gabapentin (NEURONTIN) 300 MG capsule Take 900 mg by mouth daily      losartan (COZAAR) 50 MG tablet Take 1 tablet (50 mg) by mouth 2 times daily 200 tablet 6    Probiotic Product (PROBIOTIC PO)       sulindac (CLINORIL) 200 MG tablet Take 200 mg by mouth daily            ALLERGIES:  Allergies   Allergen Reactions    Amoxicillin Rash    Diclofenac Nausea and Vomiting    Keflex [Cephalexin Monohydrate] Rash    Sulfa Antibiotics Nausea and Vomiting    Animal Dander     Seasonal Allergies        PAST MEDICAL HISTORY:    Past Medical History:   Diagnosis Date    Arthritis     Cervical dysplasia     Condyloma acuminatum     Elevated liver enzymes 2012    felt due to fatty liver    Fibroids     Gastro-oesophageal reflux disease     H/O cold sores     HPV in female     Hx of previous reproductive problem     IVF    Hypertension     during pregnancy    Intermittent asthma     mostly with allergies    Migraines     Pelvic pain in female     PONV (postoperative nausea and vomiting)     Seasonal allergies     Sphincter of Oddi dysfunction     Dr. Ras Juárez, had ercp, eus, panc stents placed    Spondyloarthropathy        PAST SURGICAL HISTORY:    Past Surgical History:   Procedure Laterality Date     SECTION N/A 2018    Procedure:  SECTION;;  Surgeon: Crista Nicole MD;  Location:  L+D    ENDOSCOPIC RETROGRADE CHOLANGIOPANCREATOGRAM  6/10/2014    Procedure: ENDOSCOPIC RETROGRADE CHOLANGIOPANCREATOGRAM;  Surgeon: Ras Juárez MD;  Location:  OR    ENDOSCOPIC RETROGRADE CHOLANGIOPANCREATOGRAPHY      ESOPHAGOSCOPY, GASTROSCOPY, DUODENOSCOPY (EGD), COMBINED  2014    Procedure: COMBINED ESOPHAGOSCOPY, GASTROSCOPY, DUODENOSCOPY (EGD), REMOVE FOREIGN BODY;  Surgeon: Gabbie Menjivar MD;  Location:  GI    [facility]    Procedure: COMBINED ENDOSCOPIC ULTRASOUND, ESOPHAGOSCOPY,  GASTROSCOPY, DUODENOSCOPY (EGD);  Surgeon: Gabbie Menjivar MD;  Location: [facility] GI    LAPAROSCOPIC CHOLECYSTECTOMY  11/12    LAPAROSCOPIC MYOMECTOMY UTERUS N/A 10/30/2015    Procedure: LAPAROSCOPIC MYOMECTOMY UTERUS;  Surgeon: Mayda Dior MD;  Location:  OR       FAMILY HISTORY:    Family History   Adopted: Yes   Problem Relation Age of Onset    Unknown/Adopted Father        SOCIAL HISTORY:    Social History     Socioeconomic History    Marital status:      Spouse name: Janet    Number of children: 0   Occupational History    Occupation:      Employer: [employer]   Tobacco Use    Smoking status: Never    Smokeless tobacco: Never   Vaping Use    Vaping Use: Never used   Substance and Sexual Activity    Alcohol use: Yes     Alcohol/week: 3.0 standard drinks of alcohol     Types: 3 Standard drinks or equivalent per week    Drug use: No    Sexual activity: Yes     Partners: Male

## 2023-08-28 NOTE — LETTER
8/28/2023    Mayda Dior MD  [address]  [facility] [address]    RE: Kelly Guerreroor       Dear Colleague,     I had the pleasure of seeing Kelly Wright in the [facility].    SERVICE DATE: 8/28/2023    PRIMARY CARE PROVIDER:  Mayda Dior  [address]  [place] [address]    REASON FOR VISIT:  Follow-up of hypertension after medication changes.     HISTORY OF PRESENT ILLNESS:  It was my pleasure to follow-up with Kelly who is a delightful 43-year-old lady of [place] ancestry, employed as an insurance .      Her medical history is significant for pregnancy-induced hypertension and subsequent benign hypertension with negative work-up for secondary hypertension, structurally normal heart on echocardiogram without concentric hypertrophy, history of migraine headaches, mild intermittent asthma, arthritis and GERD.  BMI 28.  Never tobacco user.    At her recent visit with me on 2/2/2023, I had optimized her antihypertensive regimen by increasing losartan to 50 mg 2 times daily, decreasing labetalol from 200 mg twice daily to 100 mg twice daily.  She has tolerated these changes well. Home blood pressures are excellent in the 120s/70s.  Blood pressure in clinic today slightly elevated 136/96 with a pulse of 71 bpm.  Recently struggling with neuropathic pains in her neck and arm and is on gabapentin.  Does high intensity interval training 3-4 times a week, has cut back on dietary sodium, social alcohol intake, no tobacco or recreational drug use.    Normal renal panel with potassium of 3.8, sodium 138, creatinine 0.89.  Lipids from this morning pending.  Normal CBC.    Reviewed echocardiogram dated 1/23/2023.  Structurally normal heart.    DIAGNOSES/ASSESSMENT:  1.  Benign essential hypertension.  Continue losartan, switch from labetalol to atenolol for once daily dosing and regular antihypertensive effect.  Congratulated her on her healthy lifestyle.  2.  History of  "pregnancy-induced hypertension with first pregnancy at age 38 years.  Kelly has completed her family, is using condoms for contraception and is aware that if she gets pregnant, losartan should be discontinued immediately due to teratogenic potential.    PLAN:  1.  Medications:  -- Continue losartan 50 mg 2 times daily.  -- Stop labetalol 100 mg 3 times daily.  -- Start atenolol 25 mg daily from tomorrow.  Counseled patient about potential side effects.  -- Goal blood pressures 130/80 or below.  If it remains over that, will add low-dose amlodipine.    2.  Follow-up with me in 4 months.      Tom Knight MD      Established patient.   30 minutes spent by me on the date of the encounter doing chart review, history and exam, documentation and further activities per the note.        PHYSICAL EXAMINATION:  Vitals: BP (!) 136/96   Pulse 71   Ht 1.638 m (5' 4.5\")   Wt 74.8 kg (165 lb)   LMP 07/02/2023 (Approximate)   SpO2 100%   BMI 27.88 kg/m    Wt Readings from Last 5 Encounters:   08/28/23 74.8 kg (165 lb)   07/31/23 74.7 kg (164 lb 9.6 oz)   05/15/23 74.4 kg (164 lb)   02/13/23 75.9 kg (167 lb 4.8 oz)   02/02/23 76.2 kg (168 lb)     CARDIOVASCULAR:  Regular heart sounds.  No murmur. No carotid bruit.  RESPIRATORY:  Normal breath sounds. No rales or wheeze.  EXTREMITIES:  No edema.    Encounter Diagnosis   Name Primary?    Benign essential hypertension          Orders Placed This Encounter   Procedures    Follow-Up with Cardiology           CURRENT MEDICATIONS:  Current Outpatient Medications   Medication Sig Dispense Refill    albuterol (PROAIR HFA) 108 (90 Base) MCG/ACT inhaler Inhale 2 puffs into the lungs every 6 hours as needed for shortness of breath / dyspnea Due for office visit: [phone] 18 g 0    albuterol (PROVENTIL) (2.5 MG/3ML) 0.083% neb solution Take 1 vial (2.5 mg) by nebulization every 6 hours as needed for shortness of breath, wheezing or cough 90 mL 0    atenolol (TENORMIN) 25 MG " tablet Take 1 tablet (25 mg) by mouth every morning 30 tablet 6    Cetirizine-Pseudoephedrine (ZYRTEC-D PO) Take by mouth daily       fluticasone (FLONASE) 50 MCG/ACT nasal spray Spray 1-2 sprays into both nostrils daily 3 Package 3    gabapentin (NEURONTIN) 300 MG capsule Take 900 mg by mouth daily      losartan (COZAAR) 50 MG tablet Take 1 tablet (50 mg) by mouth 2 times daily 200 tablet 6    Probiotic Product (PROBIOTIC PO)       sulindac (CLINORIL) 200 MG tablet Take 200 mg by mouth daily            ALLERGIES:  Allergies   Allergen Reactions    Amoxicillin Rash    Diclofenac Nausea and Vomiting    Keflex [Cephalexin Monohydrate] Rash    Sulfa Antibiotics Nausea and Vomiting    Animal Dander     Seasonal Allergies        PAST MEDICAL HISTORY:    Past Medical History:   Diagnosis Date    Arthritis     Cervical dysplasia     Condyloma acuminatum     Elevated liver enzymes 2012    felt due to fatty liver    Fibroids     Gastro-oesophageal reflux disease     H/O cold sores     HPV in female     Hx of previous reproductive problem     IVF    Hypertension     during pregnancy    Intermittent asthma     mostly with allergies    Migraines     Pelvic pain in female     PONV (postoperative nausea and vomiting)     Seasonal allergies     Sphincter of Oddi dysfunction     Dr. Ras Juárez, had ercp, eus, panc stents placed    Spondyloarthropathy        PAST SURGICAL HISTORY:    Past Surgical History:   Procedure Laterality Date     SECTION N/A 2018    Procedure:  SECTION;;  Surgeon: Crista Nicole MD;  Location:  L+D    ENDOSCOPIC RETROGRADE CHOLANGIOPANCREATOGRAM  6/10/2014    Procedure: ENDOSCOPIC RETROGRADE CHOLANGIOPANCREATOGRAM;  Surgeon: Ras Juárez MD;  [facility]    ENDOSCOPIC RETROGRADE CHOLANGIOPANCREATOGRAPHY      ESOPHAGOSCOPY, GASTROSCOPY, DUODENOSCOPY (EGD), COMBINED  2014    Procedure: COMBINED ESOPHAGOSCOPY, GASTROSCOPY, DUODENOSCOPY (EGD),  REMOVE FOREIGN BODY;  Surgeon: Gabbie Menjivar MD;  Location:  GI    GYN SURGERY      LEEP     [facility] ENDOSCOPY W EUS N/A 5/20/2014    Procedure: COMBINED ENDOSCOPIC ULTRASOUND, ESOPHAGOSCOPY, GASTROSCOPY, DUODENOSCOPY (EGD);  Surgeon: Gabbie Menjivar MD;  Location:  GI    LAPAROSCOPIC CHOLECYSTECTOMY  11/12    LAPAROSCOPIC MYOMECTOMY UTERUS N/A 10/30/2015    Procedure: LAPAROSCOPIC MYOMECTOMY UTERUS;  Surgeon: Mayda Dior MD;  Location:  OR       FAMILY HISTORY:    Family History   Adopted: Yes   Problem Relation Age of Onset    Unknown/Adopted Father        SOCIAL HISTORY:    Social History     Socioeconomic History    Marital status:      Spouse name: Janet    Number of children: 0   Occupational History    Occupation:      Employer: [employer]   Tobacco Use    Smoking status: Never    Smokeless tobacco: Never   Vaping Use    Vaping Use: Never used   Substance and Sexual Activity    Alcohol use: Yes     Alcohol/week: 3.0 standard drinks of alcohol     Types: 3 Standard drinks or equivalent per week    Drug use: No    Sexual activity: Yes     Partners: Male       Thank you for allowing me to participate in the care of your patient.      Sincerely,     Tom Knight MD     [facility]  cc:   Tom Knight MD  [address] [place] [id number]

## 2023-08-29 DIAGNOSIS — I10 BENIGN ESSENTIAL HYPERTENSION: ICD-10-CM

## 2023-08-29 RX ORDER — ATENOLOL 25 MG/1
25 TABLET ORAL EVERY MORNING
Qty: 90 TABLET | Refills: 1 | Status: SHIPPED | OUTPATIENT
Start: 2023-08-29 | End: 2024-02-27

## 2023-11-28 ENCOUNTER — TRANSFERRED RECORDS (OUTPATIENT)
Dept: HEALTH INFORMATION MANAGEMENT | Facility: CLINIC | Age: 43
End: 2023-11-28
Payer: COMMERCIAL

## 2023-11-28 LAB
ASTHMA CONTROL TEST SCORE: 16
ER ASTHMA: 0
HOSPITALIZATION ASTHMA: 0

## 2023-12-05 ENCOUNTER — E-VISIT (OUTPATIENT)
Dept: URGENT CARE | Facility: CLINIC | Age: 43
End: 2023-12-05
Payer: COMMERCIAL

## 2023-12-05 ENCOUNTER — NURSE TRIAGE (OUTPATIENT)
Dept: NURSING | Facility: CLINIC | Age: 43
End: 2023-12-05

## 2023-12-05 DIAGNOSIS — R05.1 ACUTE COUGH: Primary | ICD-10-CM

## 2023-12-05 PROCEDURE — 99207 PR NON-BILLABLE SERV PER CHARTING: CPT | Performed by: EMERGENCY MEDICINE

## 2023-12-05 RX ORDER — AZITHROMYCIN 250 MG/1
TABLET, FILM COATED ORAL
Qty: 6 TABLET | Refills: 0 | Status: SHIPPED | OUTPATIENT
Start: 2023-12-05 | End: 2023-12-10

## 2023-12-05 NOTE — TELEPHONE ENCOUNTER
Nurse Triage SBAR    Is this a 2nd Level Triage? YES, LICENSED PRACTITIONER REVIEW IS REQUIRED    Situation: Pt calling re: cough.    Background: Cough for about a week and a half. Asthma exacerbation for 2 weeks. Has singulair, nebulizer, wixela and steroids. Believes she has bronchitis and may need some antibiotics.    Assessment: Is coughing up green phlegm. Denies any SOB or wheezing. Does not have a fever. Says she has coughed so hard she almost threw up. Cough gets worse at night form nasal drainage.     Protocol Recommended Disposition:   See in Office Today.    Recommendation: Would recommend for patient to submit an E-visit for her symptoms. Explained the steps to do an Evisit and answered all questions. Care advice reviewed and call back information discussed. Pt verbalized understanding.    Mirna Thorne, RN, BSN  [facility]   Triage Nurse Advisor    Reason for Disposition   SEVERE coughing spells (e.g., whooping sound after coughing, vomiting after coughing)    Additional Information   Negative: Bluish (or gray) lips or face   Negative: SEVERE difficulty breathing (e.g., struggling for each breath, speaks in single words)   Negative: Rapid onset of cough and has hives   Negative: Coughing started suddenly after medicine, an allergic food or bee sting   Negative: Difficulty breathing after exposure to flames, smoke, or fumes   Negative: Sounds like a life-threatening emergency to the triager   Negative: Previous asthma attacks and this feels like asthma attack   Negative: Dry cough (non-productive; no sputum or minimal clear sputum) and within 14 days of COVID-19 Exposure   Negative: MODERATE difficulty breathing (e.g., speaks in phrases, SOB even at rest, pulse 100-120) and still present when not coughing   Negative: Chest pain present when not coughing   Negative: Passed out (i.e., fainted, collapsed and was not responding)   Negative: Patient sounds very sick or weak to the triager   Negative:  MILD difficulty breathing (e.g., minimal/no SOB at rest, SOB with walking, pulse <100) and still present when not coughing   Negative: Coughed up > 1 tablespoon (15 ml) blood (Exception: Blood-tinged sputum.)   Negative: Fever > 103 F (39.4 C)   Negative: Fever > 101 F (38.3 C) and over 60 years of age   Negative: Fever > 100.0 F (37.8 C) and has diabetes mellitus or a weak immune system (e.g., HIV positive, cancer chemotherapy, organ transplant, splenectomy, chronic steroids)   Negative: Fever > 100.0 F (37.8 C) and bedridden (e.g., CVA, chronic illness, recovering from surgery)   Negative: Increasing ankle swelling   Negative: Wheezing is present    Protocols used: Cough-A-OH

## 2024-02-05 ENCOUNTER — TRANSFERRED RECORDS (OUTPATIENT)
Dept: HEALTH INFORMATION MANAGEMENT | Facility: CLINIC | Age: 44
End: 2024-02-05
Payer: COMMERCIAL

## 2024-02-05 LAB
ALT SERPL-CCNC: 22 IU/L (ref 5–35)
AST SERPL-CCNC: 26 U/L (ref 5–34)
CREATININE (EXTERNAL): 0.82 MG/DL (ref 0.5–1.3)

## 2024-02-27 DIAGNOSIS — I10 BENIGN ESSENTIAL HYPERTENSION: ICD-10-CM

## 2024-02-27 RX ORDER — ATENOLOL 25 MG/1
25 TABLET ORAL EVERY MORNING
Qty: 90 TABLET | Refills: 1 | Status: SHIPPED | OUTPATIENT
Start: 2024-02-27 | End: 2024-06-05

## 2024-03-16 ENCOUNTER — HEALTH MAINTENANCE LETTER (OUTPATIENT)
Age: 44
End: 2024-03-16

## 2024-04-10 ENCOUNTER — TRANSFERRED RECORDS (OUTPATIENT)
Dept: MULTI SPECIALTY CLINIC | Facility: CLINIC | Age: 44
End: 2024-04-10

## 2024-05-03 ENCOUNTER — E-VISIT (OUTPATIENT)
Dept: URGENT CARE | Facility: CLINIC | Age: 44
End: 2024-05-03
Payer: COMMERCIAL

## 2024-05-03 DIAGNOSIS — R05.2 SUBACUTE COUGH: Primary | ICD-10-CM

## 2024-05-03 PROCEDURE — 99421 OL DIG E/M SVC 5-10 MIN: CPT | Performed by: EMERGENCY MEDICINE

## 2024-05-03 RX ORDER — AZITHROMYCIN 250 MG/1
TABLET, FILM COATED ORAL
Qty: 6 TABLET | Refills: 0 | Status: SHIPPED | OUTPATIENT
Start: 2024-05-03 | End: 2024-05-08

## 2024-05-03 NOTE — PATIENT INSTRUCTIONS
Thank you for choosing us for your care. I have placed an order for a prescription so that you can start treatment. View your full visit summary for details by clicking on the link below. Your pharmacist will able to address any questions you may have about the medication.     If you're not feeling better within 5-7 days, please schedule an appointment.  You can schedule an appointment right here in [facility], or call [phone]  If the visit is for the same symptoms as your eVisit, we'll refund the cost of your eVisit if seen within seven days.

## 2024-05-25 ENCOUNTER — HEALTH MAINTENANCE LETTER (OUTPATIENT)
Age: 44
End: 2024-05-25

## 2024-06-05 ENCOUNTER — OFFICE VISIT (OUTPATIENT)
Dept: CARDIOLOGY | Facility: CLINIC | Age: 44
End: 2024-06-05
Payer: COMMERCIAL

## 2024-06-05 VITALS
SYSTOLIC BLOOD PRESSURE: 116 MMHG | DIASTOLIC BLOOD PRESSURE: 82 MMHG | OXYGEN SATURATION: 97 % | WEIGHT: 167.4 LBS | BODY MASS INDEX: 27.89 KG/M2 | HEART RATE: 60 BPM | HEIGHT: 65 IN

## 2024-06-05 DIAGNOSIS — E78.2 MIXED DYSLIPIDEMIA: ICD-10-CM

## 2024-06-05 DIAGNOSIS — I10 BENIGN ESSENTIAL HYPERTENSION: Primary | ICD-10-CM

## 2024-06-05 PROBLEM — O13.9 GESTATIONAL HTN: Status: RESOLVED | Noted: 2018-09-04 | Resolved: 2024-06-05

## 2024-06-05 PROCEDURE — 99214 OFFICE O/P EST MOD 30 MIN: CPT | Performed by: INTERNAL MEDICINE

## 2024-06-05 PROCEDURE — G2211 COMPLEX E/M VISIT ADD ON: HCPCS | Performed by: INTERNAL MEDICINE

## 2024-06-05 RX ORDER — ATENOLOL 25 MG/1
25 TABLET ORAL EVERY MORNING
Qty: 100 TABLET | Refills: 6 | Status: SHIPPED | OUTPATIENT
Start: 2024-06-05

## 2024-06-05 RX ORDER — LOSARTAN POTASSIUM 25 MG/1
50 TABLET ORAL 2 TIMES DAILY
Qty: 200 TABLET | Refills: 5 | Status: SHIPPED | OUTPATIENT
Start: 2024-06-05 | End: 2024-06-07

## 2024-06-05 NOTE — PROGRESS NOTES
SERVICE DATE: June 5, 2024    REFERRING PROVIDER:  Referred Self, MD  No address on file    PRIMARY CARE AND REFERRING PROVIDER:  Mayda Dior  [address]  [place] [address]    REASON FOR VISIT:  Follow-up of hypertension.    HISTORY OF PRESENT ILLNESS:  Kelly Wright is [age] year old lady, insurance , lives with her  and 5-year-old son centimeters.    I have followed her for a prior history of pregnancy-induced hypertension 5 years ago, subsequent benign essential hypertension with negative workup for secondary hypertension.  Structurally normal heart on echocardiogram without hypertrophy.  She has mild intermittent asthma, migraine headaches, arthritis, GERD, never tobacco user, BMI 28, exercises regularly.    She is on the regular regimen of atenolol 25 mg daily, losartan 50 mg twice daily.  The last year, as advised, as cut back on social alcohol intake.  Does circuit training type of exercises 3 times per week, active with her 5-year-old.  The family enjoys hiking.  Her BP is 116/82, pulse of 60 bpm, weight stable at 167 pounds.  No cardiovascular symptoms.    Show normal renal panel with a sodium 138, potassium 3.8, creatinine 0.89, lipid panel shows LDL of 74.  Recent echocardiogram with normal LVEF of 65%, no wall motion abnormalities, normal diastolic function, normal left ventricular mass, normal right ventricular size and systolic function, normal atrial size, normal cardiac valves.    DIAGNOSES/ASSESSMENT:  Benign essential hypertension with a history of pregnancy induced hypertension with total pregnancy.age 38 years.  BP well-controlled.  In fact, intermittently she gets rare postural dizziness and her BP is 116/82 so I recommend cutting back on the losartan.  Mixed dyslipidemia.  Last lipid check HDL was low at 48, LDL at goal at 74.  She is not on lipid-lowering therapy.  Will recheck lipid panel at next visit.    PLAN:  Decrease losartan from 50 mg twice daily  "to 25 mg twice daily.  Continue atenolol 25 mg once daily.  She has a home blood pressure cuff, will check blood pressure on the new medication regimen and send me a [email or web address] message in a few weeks.  Congratulated her on her healthy lifestyle.  Follow-up with me in 1 year with fasting labs (CMP, TSH, lipid panel).        Tom Knight MD      Established patient.   30 minutes spent by me on the date of the encounter doing chart review, history and exam, documentation and further activities per the note      The longitudinal plan of care for the condition(s) below were addressed during this visit. Due to the added complexity in care, I will continue to support Kelly in the subsequent management of this condition(s) and with the ongoing continuity of care of this condition(s).    Problem List Items Addressed This Visit as of 6/5/2024   Hypertension.       This note was completed in part using dictation via the Dragon voice recognition software. Some word and grammatical errors may occur and must be interpreted in the appropriate clinical context. If there are any questions pertaining to this issue, please contact me for further clarification.       Vitals: /82   Pulse 60   Ht 1.638 m (5' 4.5\")   Wt 75.9 kg (167 lb 6.4 oz)   SpO2 97%   BMI 28.29 kg/m    Wt Readings from Last 5 Encounters:   06/05/24 75.9 kg (167 lb 6.4 oz)   08/28/23 74.8 kg (165 lb)   07/31/23 74.7 kg (164 lb 9.6 oz)   05/15/23 74.4 kg (164 lb)   02/13/23 75.9 kg (167 lb 4.8 oz)         Orders Placed This Encounter   Procedures    Comprehensive metabolic panel    Lipid Profile    TSH with free T4 reflex    Follow-Up with Cardiology           CURRENT MEDICATIONS:  Current Outpatient Medications   Medication Sig Dispense Refill    albuterol (PROAIR HFA) 108 (90 Base) MCG/ACT inhaler Inhale 2 puffs into the lungs every 6 hours as needed for shortness of breath / dyspnea Due for office visit: [phone] 18 g 0    albuterol " (PROVENTIL) (2.5 MG/3ML) 0.083% neb solution Take 1 vial (2.5 mg) by nebulization every 6 hours as needed for shortness of breath, wheezing or cough 90 mL 0    atenolol (TENORMIN) 25 MG tablet Take 1 tablet (25 mg) by mouth every morning 100 tablet 6    Cetirizine-Pseudoephedrine (ZYRTEC-D PO) Take by mouth daily       fluticasone (FLONASE) 50 MCG/ACT nasal spray Spray 1-2 sprays into both nostrils daily 3 Package 3    gabapentin (NEURONTIN) 300 MG capsule Take 600 mg by mouth daily      losartan (COZAAR) 25 MG tablet Take 2 tablets (50 mg) by mouth 2 times daily 200 tablet 5    Probiotic Product (PROBIOTIC PO)       sulindac (CLINORIL) 200 MG tablet Take 200 mg by mouth daily            ALLERGIES:  Allergies   Allergen Reactions    Amoxicillin Rash    Diclofenac Nausea and Vomiting    Keflex [Cephalexin Monohydrate] Rash    Sulfa Antibiotics Nausea and Vomiting    Animal Dander     Seasonal Allergies        PAST MEDICAL HISTORY:    Past Medical History:   Diagnosis Date    Arthritis     Cervical dysplasia     Condyloma acuminatum     Elevated liver enzymes 2012    felt due to fatty liver    Fibroids     Gastro-oesophageal reflux disease     H/O cold sores     HPV in female     Hx of previous reproductive problem     IVF    Hypertension     during pregnancy    Intermittent asthma     mostly with allergies    Migraines     Pelvic pain in female     PONV (postoperative nausea and vomiting)     Seasonal allergies     Sphincter of Oddi dysfunction     Dr. Ras Juárez, had ercp, eus, panc stents placed    Spondyloarthropathy        PAST SURGICAL HISTORY:    Past Surgical History:   Procedure Laterality Date     SECTION N/A 2018    Procedure:  SECTION;;  Surgeon: Crista Nicole MD;  Location:  [facility]D    ENDOSCOPIC RETROGRADE CHOLANGIOPANCREATOGRAM  6/10/2014    Procedure: ENDOSCOPIC RETROGRADE CHOLANGIOPANCREATOGRAM;  Surgeon: Ras Juárez MD;  Location:  OR     ENDOSCOPIC RETROGRADE CHOLANGIOPANCREATOGRAPHY  11/12    ESOPHAGOSCOPY, GASTROSCOPY, DUODENOSCOPY (EGD), COMBINED  7/8/2014    Procedure: COMBINED ESOPHAGOSCOPY, GASTROSCOPY, DUODENOSCOPY (EGD), REMOVE FOREIGN BODY;  Surgeon: Gabbie Menjivar MD;  Location: [facility]    GYN SURGERY      [facility] UGI ENDOSCOPY W EUS N/A 5/20/2014    Procedure: COMBINED ENDOSCOPIC ULTRASOUND, ESOPHAGOSCOPY, GASTROSCOPY, DUODENOSCOPY (EGD);  Surgeon: Gabbie Menjivar MD;  Location: [facility]    LAPAROSCOPIC CHOLECYSTECTOMY  11/12    LAPAROSCOPIC MYOMECTOMY UTERUS N/A 10/30/2015    Procedure: LAPAROSCOPIC MYOMECTOMY UTERUS;  Surgeon: Mayda Dior MD;  Location:  OR       FAMILY HISTORY:    Family History   Adopted: Yes   Problem Relation Age of Onset    Unknown/Adopted Father

## 2024-06-05 NOTE — LETTER
6/5/2024    Mayda Dior MD  [address]  [facility] [address]    RE: Kelly Wright       Dear Colleague,     I had the pleasure of seeing Kelly Wright in the [facility].    SERVICE DATE: June 5, 2024    REFERRING PROVIDER:  Referred Self, MD  No address on file    PRIMARY CARE AND REFERRING PROVIDER:  Mayda Dior  [address]  [place] [address]    REASON FOR VISIT:  Follow-up of hypertension.    HISTORY OF PRESENT ILLNESS:  Kelly Wright is [age] year old lady, insurance , lives with her  and 5-year-old son centimeters.    I have followed her for a prior history of pregnancy-induced hypertension 5 years ago, subsequent benign essential hypertension with negative workup for secondary hypertension.  Structurally normal heart on echocardiogram without hypertrophy.  She has mild intermittent asthma, migraine headaches, arthritis, GERD, never tobacco user, BMI 28, exercises regularly.    She is on the regular regimen of atenolol 25 mg daily, losartan 50 mg twice daily.  The last year, as advised, as cut back on social alcohol intake.  Does circuit training type of exercises 3 times per week, active with her 5-year-old.  The family enjoys hiking.  Her BP is 116/82, pulse of 60 bpm, weight stable at 167 pounds.  No cardiovascular symptoms.    Show normal renal panel with a sodium 138, potassium 3.8, creatinine 0.89, lipid panel shows LDL of 74.  Recent echocardiogram with normal LVEF of 65%, no wall motion abnormalities, normal diastolic function, normal left ventricular mass, normal right ventricular size and systolic function, normal atrial size, normal cardiac valves.    DIAGNOSES/ASSESSMENT:  Benign essential hypertension with a history of pregnancy induced hypertension with total pregnancy.age 38 years.  BP well-controlled.  In fact, intermittently she gets rare postural dizziness and her BP is 116/82 so I recommend cutting back on the  "losartan.  Mixed dyslipidemia.  Last lipid check HDL was low at 48, LDL at goal at 74.  She is not on lipid-lowering therapy.  Will recheck lipid panel at next visit.    PLAN:  Decrease losartan from 50 mg twice daily to 40 mg twice daily.  Continue atenolol 25 mg once daily.  She has a home blood pressure cuff, will check blood pressure on the new medication regimen and send me a [employer] message in a few weeks.  Congratulated her on her healthy lifestyle.  Follow-up with me in 1 year with fasting labs (CMP, TSH, lipid panel).        Tom Knight MD      Established patient.   30 minutes spent by me on the date of the encounter doing chart review, history and exam, documentation and further activities per the note      The longitudinal plan of care for the condition(s) below were addressed during this visit. Due to the added complexity in care, I will continue to support Kelly in the subsequent management of this condition(s) and with the ongoing continuity of care of this condition(s).    Problem List Items Addressed This Visit as of 6/5/2024   Hypertension.       This note was completed in part using dictation via the Dragon voice recognition software. Some word and grammatical errors may occur and must be interpreted in the appropriate clinical context. If there are any questions pertaining to this issue, please contact me for further clarification.       Vitals: /82   Pulse 60   Ht 1.638 m (5' 4.5\")   Wt 75.9 kg (167 lb 6.4 oz)   SpO2 97%   BMI 28.29 kg/m    Wt Readings from Last 5 Encounters:   06/05/24 75.9 kg (167 lb 6.4 oz)   08/28/23 74.8 kg (165 lb)   07/31/23 74.7 kg (164 lb 9.6 oz)   05/15/23 74.4 kg (164 lb)   02/13/23 75.9 kg (167 lb 4.8 oz)         Orders Placed This Encounter   Procedures    Comprehensive metabolic panel    Lipid Profile    TSH with free T4 reflex    Follow-Up with Cardiology           CURRENT MEDICATIONS:  Current Outpatient Medications   Medication Sig " Dispense Refill    albuterol (PROAIR HFA) 108 (90 Base) MCG/ACT inhaler Inhale 2 puffs into the lungs every 6 hours as needed for shortness of breath / dyspnea Due for office visit: [phone] 18 g 0    albuterol (PROVENTIL) (2.5 MG/3ML) 0.083% neb solution Take 1 vial (2.5 mg) by nebulization every 6 hours as needed for shortness of breath, wheezing or cough 90 mL 0    atenolol (TENORMIN) 25 MG tablet Take 1 tablet (25 mg) by mouth every morning 100 tablet 6    Cetirizine-Pseudoephedrine (ZYRTEC-D PO) Take by mouth daily       fluticasone (FLONASE) 50 MCG/ACT nasal spray Spray 1-2 sprays into both nostrils daily 3 Package 3    gabapentin (NEURONTIN) 300 MG capsule Take 600 mg by mouth daily      losartan (COZAAR) 25 MG tablet Take 2 tablets (50 mg) by mouth 2 times daily 200 tablet 5    Probiotic Product (PROBIOTIC PO)       sulindac (CLINORIL) 200 MG tablet Take 200 mg by mouth daily            ALLERGIES:  Allergies   Allergen Reactions    Amoxicillin Rash    Diclofenac Nausea and Vomiting    Keflex [Cephalexin Monohydrate] Rash    Sulfa Antibiotics Nausea and Vomiting    Animal Dander     Seasonal Allergies        PAST MEDICAL HISTORY:    Past Medical History:   Diagnosis Date    Arthritis     Cervical dysplasia     Condyloma acuminatum     Elevated liver enzymes 2012    felt due to fatty liver    Fibroids     Gastro-oesophageal reflux disease     H/O cold sores     HPV in female     Hx of previous reproductive problem     IVF    Hypertension     during pregnancy    Intermittent asthma     mostly with allergies    Migraines     Pelvic pain in female     PONV (postoperative nausea and vomiting)     Seasonal allergies     Sphincter of Oddi dysfunction     Dr. Ras Juárez, had ercp, eus, panc stents placed    Spondyloarthropathy        PAST SURGICAL HISTORY:    Past Surgical History:   Procedure Laterality Date     SECTION N/A 2018    Procedure:  SECTION;;  Surgeon: Bonnie  Crista Prieto MD;  Location:  L[facility]D    ENDOSCOPIC RETROGRADE CHOLANGIOPANCREATOGRAM  6/10/2014    Procedure: ENDOSCOPIC RETROGRADE CHOLANGIOPANCREATOGRAM;  Surgeon: Ras Juárez MD;  Location:  OR    ENDOSCOPIC RETROGRADE CHOLANGIOPANCREATOGRAPHY  11/12    ESOPHAGOSCOPY, GASTROSCOPY, DUODENOSCOPY (EGD), COMBINED  7/8/2014    Procedure: COMBINED ESOPHAGOSCOPY, GASTROSCOPY, DUODENOSCOPY (EGD), REMOVE FOREIGN BODY;  Surgeon: Gabbie Menjivar MD;  Location:  GI    GYN SURGERY      [facility] UGI ENDOSCOPY W EUS N/A 5/20/2014    Procedure: COMBINED ENDOSCOPIC ULTRASOUND, ESOPHAGOSCOPY, GASTROSCOPY, DUODENOSCOPY (EGD);  Surgeon: Gabbie Menjivar MD;  Location: [facility]    LAPAROSCOPIC CHOLECYSTECTOMY  11/12    LAPAROSCOPIC MYOMECTOMY UTERUS N/A 10/30/2015    Procedure: LAPAROSCOPIC MYOMECTOMY UTERUS;  Surgeon: Mayda Dior MD;  Location: [facility]       FAMILY HISTORY:    Family History   Adopted: Yes   Problem Relation Age of Onset    Unknown/Adopted Father          Thank you for allowing me to participate in the care of your patient.      Sincerely,     Tom Knight MD     [facility]  cc:   Referred Self,

## 2024-06-06 ENCOUNTER — TELEPHONE (OUTPATIENT)
Dept: CARDIOLOGY | Facility: CLINIC | Age: 44
End: 2024-06-06
Payer: COMMERCIAL

## 2024-06-06 DIAGNOSIS — I10 BENIGN ESSENTIAL HYPERTENSION: ICD-10-CM

## 2024-06-06 RX ORDER — LOSARTAN POTASSIUM 25 MG/1
50 TABLET ORAL 2 TIMES DAILY
Qty: 360 TABLET | Refills: 4 | Status: CANCELLED | OUTPATIENT
Start: 2024-06-06

## 2024-06-06 NOTE — TELEPHONE ENCOUNTER
[facility] requests quantity update for losartan refill, 360 tabs not 200 tabs. Rx was sent for 50mg BID however Dr Knight's note from 6/5 says to decrease to 40mg BID. Will clarify with provider.

## 2024-06-07 RX ORDER — LOSARTAN POTASSIUM 25 MG/1
25 TABLET ORAL 2 TIMES DAILY
Qty: 200 TABLET | Refills: 5 | Status: SHIPPED | OUTPATIENT
Start: 2024-06-07

## 2024-06-07 NOTE — TELEPHONE ENCOUNTER
Tom Knight MD Hiljus, Audrey G RN  Cc: CHARLA Tellez [facility] 2  Caller: Unspecified (Yesterday,  3:15 PM)    It is actually 25 mg twice daily.  I sent in a new prescription and modified my note.  Thank you for picking it up!    SJ      Updated prescription sent to pharmacy by Dr Knight.

## 2024-07-15 ENCOUNTER — OFFICE VISIT (OUTPATIENT)
Dept: FAMILY MEDICINE | Facility: CLINIC | Age: 44
End: 2024-07-15
Payer: COMMERCIAL

## 2024-07-15 VITALS
HEIGHT: 64 IN | DIASTOLIC BLOOD PRESSURE: 88 MMHG | RESPIRATION RATE: 18 BRPM | WEIGHT: 167.4 LBS | BODY MASS INDEX: 28.58 KG/M2 | TEMPERATURE: 98.1 F | HEART RATE: 87 BPM | SYSTOLIC BLOOD PRESSURE: 110 MMHG | OXYGEN SATURATION: 97 %

## 2024-07-15 DIAGNOSIS — J02.9 SORE THROAT: ICD-10-CM

## 2024-07-15 DIAGNOSIS — J01.90 ACUTE SINUSITIS WITH SYMPTOMS > 10 DAYS: Primary | ICD-10-CM

## 2024-07-15 LAB
DEPRECATED S PYO AG THROAT QL EIA: NEGATIVE
GROUP A STREP BY PCR: NOT DETECTED

## 2024-07-15 PROCEDURE — 99213 OFFICE O/P EST LOW 20 MIN: CPT | Performed by: STUDENT IN AN ORGANIZED HEALTH CARE EDUCATION/TRAINING PROGRAM

## 2024-07-15 PROCEDURE — 87651 STREP A DNA AMP PROBE: CPT | Performed by: STUDENT IN AN ORGANIZED HEALTH CARE EDUCATION/TRAINING PROGRAM

## 2024-07-15 RX ORDER — DOXYCYCLINE HYCLATE 100 MG
100 TABLET ORAL 2 TIMES DAILY
Qty: 14 TABLET | Refills: 0 | Status: SHIPPED | OUTPATIENT
Start: 2024-07-15

## 2024-07-15 RX ORDER — SUMATRIPTAN 100 MG/1
100 TABLET, FILM COATED ORAL
COMMUNITY

## 2024-07-15 RX ORDER — TRETINOIN 0.25 MG/G
CREAM TOPICAL AT BEDTIME
COMMUNITY
Start: 2024-06-13

## 2024-07-15 ASSESSMENT — ASTHMA QUESTIONNAIRES
QUESTION_2 LAST FOUR WEEKS HOW OFTEN HAVE YOU HAD SHORTNESS OF BREATH: NOT AT ALL
ACT_TOTALSCORE: 24
QUESTION_4 LAST FOUR WEEKS HOW OFTEN HAVE YOU USED YOUR RESCUE INHALER OR NEBULIZER MEDICATION (SUCH AS ALBUTEROL): NOT AT ALL
ACT_TOTALSCORE: 24
QUESTION_5 LAST FOUR WEEKS HOW WOULD YOU RATE YOUR ASTHMA CONTROL: WELL CONTROLLED
QUESTION_3 LAST FOUR WEEKS HOW OFTEN DID YOUR ASTHMA SYMPTOMS (WHEEZING, COUGHING, SHORTNESS OF BREATH, CHEST TIGHTNESS OR PAIN) WAKE YOU UP AT NIGHT OR EARLIER THAN USUAL IN THE MORNING: NOT AT ALL
QUESTION_1 LAST FOUR WEEKS HOW MUCH OF THE TIME DID YOUR ASTHMA KEEP YOU FROM GETTING AS MUCH DONE AT WORK, SCHOOL OR AT HOME: NONE OF THE TIME

## 2024-07-15 ASSESSMENT — ENCOUNTER SYMPTOMS: SORE THROAT: 1

## 2024-07-15 NOTE — PROGRESS NOTES
"  Assessment & Plan     Acute sinusitis with symptoms > 10 days  Patient with sinusitis for 8 to 9 days, is mildly improving but still persistent.  Potentially triggered by allergies, but is on allergy medication already with antihistamine and Flonase.  Encouraged patient to continue Flonase and nasal sinus rinse with distilled water.  If no improvement after 10 days of illness, should start to take doxycycline, as patient is allergic to amoxicillin.  She is agreeable to this, in order to receive doxycycline order placed which she can  if no improvement.  She should notify me if not improving after 2 to 3 days on this medication.  - doxycycline hyclate (VIBRA-TABS) 100 MG tablet  Dispense: 14 tablet; Refill: 0      Sore throat  Patient with mild sore throat manage by Tylenol for 8 to 9 days, which is not improving.  On examination, no purulent discharge from the tonsils, no cervical lymphadenopathy.  Due to possible strep pharyngitis, PCR and swab were done, will await results.  Until that time we will continue to manage pain with Tylenol.            BMI  Estimated body mass index is 28.3 kg/m  as calculated from the following:    Height as of this encounter: 1.638 m (5' 4.49\").    Weight as of this encounter: 75.9 kg (167 lb 6.4 oz).         Alonso Mendoza is a 43 year old, presenting for the following health issues:  Pharyngitis (Approximately week, other symptoms consist of a headache, sinus, and ear pressure.)      7/15/2024     3:01 PM   Additional Questions   Roomed by JANNETTE Jones     Via the Health Maintenance questionnaire, the patient has reported the following services have been completed -Mammogram: [facility] 2024-04-10, this information has been sent to the abstraction team.  Pharyngitis     History of Present Illness       Reason for visit:  Sore throat  Symptom onset:  3-7 days ago  Symptoms include:  Sore throat  Symptom intensity:  Moderate  Symptom progression:  Staying the same  Had these " "symptoms before:  Yes  Has tried/received treatment for these symptoms:  Yes  Previous treatment was successful:  Yes  Prior treatment description:  Antibiotic  What makes it worse:  No  What makes it better:  No    She eats 2-3 servings of fruits and vegetables daily.She consumes 0 sweetened beverage(s) daily.She exercises with enough effort to increase her heart rate 30 to 60 minutes per day.  She exercises with enough effort to increase her heart rate 4 days per week.   She is taking medications regularly.     Has had sinus pressure in the frontal mid area bilaterally for the past week, about 8-9 days ago.  The pressure and headache has improved.  Sore throat for about one week as well.     Denies coughing    Has been taking zyrtec and flonase, as well as tylenol, tylenol did help.               Review of Systems  Constitutional, neuro, ENT, endocrine, pulmonary, cardiac, gastrointestinal, genitourinary, musculoskeletal, integument and psychiatric systems are negative, except as otherwise noted.      Objective    /88 (BP Location: Right arm, Patient Position: Sitting, Cuff Size: Adult Regular)   Pulse 87   Temp 98.1  F (36.7  C) (Oral)   Resp 18   Ht 1.638 m (5' 4.49\")   Wt 75.9 kg (167 lb 6.4 oz)   LMP 07/01/2024 (Approximate)   SpO2 97%   BMI 28.30 kg/m    Body mass index is 28.3 kg/m .  Physical Exam   GENERAL: alert and no distress  EYES: Eyes grossly normal to inspection, PERRL and conjunctivae and sclerae normal  HENT: ear canals and TM's normal, nose and mouth without ulcers or lesions, tonsils appear normal, no exudate.  Sinuses nontender.  NECK: no tenderness or adenopathy, no asymmetry, masses, or scars  RESP: lungs clear to auscultation - no rales, rhonchi or wheezes  MS: no gross musculoskeletal defects noted, no edema  SKIN: no suspicious lesions or rashes  NEURO: Normal strength and tone, mentation intact and speech normal  PSYCH: mentation appears normal, affect normal/bright        "     Signed Electronically by: Raghu Garcia MD

## 2024-07-15 NOTE — PATIENT INSTRUCTIONS
Sore Throat: Care Instructions  Overview     Infection by bacteria or a virus causes most sore throats. Cigarette smoke, dry air, air pollution, allergies, and yelling can also cause a sore throat. Sore throats can be painful and annoying. Fortunately, most sore throats go away on their own. If you have a bacterial infection, your doctor may prescribe antibiotics.  Follow-up care is a key part of your treatment and safety. Be sure to make and go to all appointments, and call your doctor if you are having problems. It's also a good idea to know your test results and keep a list of the medicines you take.  How can you care for yourself at home?  If your doctor prescribed antibiotics, take them as directed. Do not stop taking them just because you feel better. You need to take the full course of antibiotics.  Gargle with warm salt water several times a day to help reduce swelling and relieve pain. Mix 1/2 teaspoon of salt in 1 cup of warm water.  Take an over-the-counter pain medicine, such as acetaminophen (Tylenol), ibuprofen (Advil, Motrin), or naproxen (Aleve). Read and follow all instructions on the label.  Be careful when taking over-the-counter cold or flu medicines and Tylenol at the same time. Many of these medicines have acetaminophen, which is Tylenol. Read the labels to make sure that you are not taking more than the recommended dose. Too much acetaminophen (Tylenol) can be harmful.  Drink plenty of fluids. Fluids may help soothe an irritated throat. Hot fluids, such as tea or soup, may help decrease throat pain.  Use over-the-counter throat lozenges to soothe pain. Regular cough drops or hard candy may also help. These should not be given to young children because of the risk of choking.  Do not smoke or allow others to smoke around you. If you need help quitting, talk to your doctor about stop-smoking programs and medicines. These can increase your chances of quitting for good.  Use a vaporizer or  "humidifier to add moisture to your bedroom. Follow the directions for cleaning the machine.  When should you call for help?   Call your doctor now or seek immediate medical care if:    You have trouble breathing.     Your sore throat gets much worse on one side.     You have new or worse trouble swallowing.     You have a new or higher fever.   Watch closely for changes in your health, and be sure to contact your doctor if you do not get better as expected.  Where can you learn more?  Go to https://www.[employer].net/patiented  Enter U420 in the search box to learn more about \"Sore Throat: Care Instructions.\"  Current as of: September 27, 2023               Content Version: 14.0    5068-4476 [employer].   Care instructions adapted under license by your healthcare professional. If you have questions about a medical condition or this instruction, always ask your healthcare professional. [employer] disclaims any warranty or liability for your use of this information.      "

## 2025-04-03 ENCOUNTER — TRANSFERRED RECORDS (OUTPATIENT)
Dept: HEALTH INFORMATION MANAGEMENT | Facility: CLINIC | Age: 45
End: 2025-04-03
Payer: COMMERCIAL

## 2025-06-14 ENCOUNTER — HEALTH MAINTENANCE LETTER (OUTPATIENT)
Age: 45
End: 2025-06-14

## 2025-08-07 ENCOUNTER — TELEPHONE (OUTPATIENT)
Dept: CARDIOLOGY | Facility: CLINIC | Age: 45
End: 2025-08-07
Payer: COMMERCIAL

## 2025-08-07 DIAGNOSIS — I10 BENIGN ESSENTIAL HYPERTENSION: ICD-10-CM

## 2025-08-07 RX ORDER — LOSARTAN POTASSIUM 25 MG/1
25 TABLET ORAL 2 TIMES DAILY
Qty: 180 TABLET | Refills: 0 | Status: SHIPPED | OUTPATIENT
Start: 2025-08-07

## 2025-08-07 RX ORDER — ATENOLOL 25 MG/1
25 TABLET ORAL EVERY MORNING
Qty: 90 TABLET | Refills: 0 | Status: SHIPPED | OUTPATIENT
Start: 2025-08-07

## (undated) DEVICE — STPL SKIN PROXIMATE 35 WIDE PMW35

## (undated) DEVICE — SUCTION CANISTER MEDIVAC LINER 3000ML W/LID 65651-530

## (undated) DEVICE — DRSG STERI STRIP 1/4X3" R1541

## (undated) DEVICE — SU VICRYL 3-0 SH 27" J316H

## (undated) DEVICE — GLOVE PROTEXIS W/NEU-THERA 6.5  2D73TE65

## (undated) DEVICE — LINEN C-SECTION 5415

## (undated) DEVICE — GLOVE PROTEXIS BLUE W/NEU-THERA 6.5  2D73EB65

## (undated) DEVICE — GLOVE PROTEXIS W/NEU-THERA 7.0  2D73TE70

## (undated) DEVICE — DRSG TELFA 3X8" 1238

## (undated) DEVICE — SU VICRYL 0 CT-1 27" J340H

## (undated) DEVICE — DRSG GAUZE 4X4" TOPPER

## (undated) DEVICE — SU MONOCRYL 0 CTX 36" Y398H

## (undated) DEVICE — ESU GROUND PAD UNIVERSAL W/O CORD

## (undated) DEVICE — CATH TRAY FOLEY 16FR BARDEX W/DRAIN BAG STATLOCK 300316A

## (undated) DEVICE — SOL NACL 0.9% IRRIG 1000ML BOTTLE 07138-09

## (undated) DEVICE — SU VICRYL 0 CT 36" J358H

## (undated) DEVICE — SU CHROMIC 3-0 CT-1 27" 810H

## (undated) DEVICE — PREP CHLORAPREP 26ML TINTED ORANGE  260815

## (undated) DEVICE — PACK C-SECTION LF PL15OTA83B

## (undated) DEVICE — BLADE CLIPPER 4406